# Patient Record
Sex: MALE | Race: WHITE | NOT HISPANIC OR LATINO | Employment: OTHER | URBAN - METROPOLITAN AREA
[De-identification: names, ages, dates, MRNs, and addresses within clinical notes are randomized per-mention and may not be internally consistent; named-entity substitution may affect disease eponyms.]

---

## 2021-08-24 PROCEDURE — 88305 TISSUE EXAM BY PATHOLOGIST: CPT | Performed by: UROLOGY

## 2021-08-25 ENCOUNTER — LAB REQUISITION (OUTPATIENT)
Dept: LAB | Facility: HOSPITAL | Age: 66
End: 2021-08-25

## 2021-08-25 DIAGNOSIS — R33.8 OTHER RETENTION OF URINE: ICD-10-CM

## 2021-08-25 DIAGNOSIS — N40.1 BENIGN PROSTATIC HYPERPLASIA WITH LOWER URINARY TRACT SYMPTOMS: ICD-10-CM

## 2021-08-26 LAB
LAB AP CASE REPORT: NORMAL
PATH REPORT.FINAL DX SPEC: NORMAL
PATH REPORT.GROSS SPEC: NORMAL

## 2023-12-01 ENCOUNTER — HOSPITAL ENCOUNTER (INPATIENT)
Facility: HOSPITAL | Age: 68
LOS: 3 days | Discharge: NURSING, REHAB OR LONG-TERM CARE | End: 2023-12-06
Attending: FAMILY MEDICINE | Admitting: FAMILY MEDICINE
Payer: MEDICARE

## 2023-12-01 ENCOUNTER — APPOINTMENT (OUTPATIENT)
Dept: GENERAL RADIOLOGY | Facility: HOSPITAL | Age: 68
End: 2023-12-01
Payer: MEDICARE

## 2023-12-01 ENCOUNTER — TELEPHONE (OUTPATIENT)
Dept: NEUROSURGERY | Facility: CLINIC | Age: 68
End: 2023-12-01
Payer: MEDICARE

## 2023-12-01 DIAGNOSIS — S32.020A COMPRESSION FRACTURE OF L2 VERTEBRA, INITIAL ENCOUNTER: Primary | ICD-10-CM

## 2023-12-01 PROBLEM — M54.59 INTRACTABLE LOW BACK PAIN: Status: ACTIVE | Noted: 2023-12-01

## 2023-12-01 LAB
BILIRUB UR QL STRIP: NEGATIVE
CLARITY UR: CLEAR
COLOR UR: YELLOW
GLUCOSE UR STRIP-MCNC: NEGATIVE MG/DL
HGB UR QL STRIP.AUTO: NEGATIVE
KETONES UR QL STRIP: NEGATIVE
LEUKOCYTE ESTERASE UR QL STRIP.AUTO: NEGATIVE
NITRITE UR QL STRIP: NEGATIVE
PH UR STRIP.AUTO: 6.5 [PH] (ref 5–8)
PROT UR QL STRIP: NEGATIVE
SP GR UR STRIP: 1.01 (ref 1–1.03)
UROBILINOGEN UR QL STRIP: NORMAL

## 2023-12-01 PROCEDURE — 72110 X-RAY EXAM L-2 SPINE 4/>VWS: CPT

## 2023-12-01 PROCEDURE — 71046 X-RAY EXAM CHEST 2 VIEWS: CPT

## 2023-12-01 PROCEDURE — 25010000002 ENOXAPARIN PER 10 MG: Performed by: FAMILY MEDICINE

## 2023-12-01 PROCEDURE — G0378 HOSPITAL OBSERVATION PER HR: HCPCS

## 2023-12-01 PROCEDURE — 81003 URINALYSIS AUTO W/O SCOPE: CPT | Performed by: FAMILY MEDICINE

## 2023-12-01 RX ORDER — DESMOPRESSIN ACETATE 0.2 MG/1
200 TABLET ORAL NIGHTLY
Status: DISCONTINUED | OUTPATIENT
Start: 2023-12-01 | End: 2023-12-02

## 2023-12-01 RX ORDER — SODIUM CHLORIDE 9 MG/ML
40 INJECTION, SOLUTION INTRAVENOUS AS NEEDED
Status: DISCONTINUED | OUTPATIENT
Start: 2023-12-01 | End: 2023-12-06 | Stop reason: HOSPADM

## 2023-12-01 RX ORDER — ONDANSETRON 2 MG/ML
4 INJECTION INTRAMUSCULAR; INTRAVENOUS EVERY 6 HOURS PRN
Status: DISCONTINUED | OUTPATIENT
Start: 2023-12-01 | End: 2023-12-06 | Stop reason: HOSPADM

## 2023-12-01 RX ORDER — FAMOTIDINE 20 MG/1
40 TABLET, FILM COATED ORAL DAILY
Status: DISCONTINUED | OUTPATIENT
Start: 2023-12-01 | End: 2023-12-06 | Stop reason: HOSPADM

## 2023-12-01 RX ORDER — SODIUM CHLORIDE 0.9 % (FLUSH) 0.9 %
10 SYRINGE (ML) INJECTION AS NEEDED
Status: DISCONTINUED | OUTPATIENT
Start: 2023-12-01 | End: 2023-12-06 | Stop reason: HOSPADM

## 2023-12-01 RX ORDER — SODIUM CHLORIDE 0.9 % (FLUSH) 0.9 %
10 SYRINGE (ML) INJECTION EVERY 12 HOURS SCHEDULED
Status: DISCONTINUED | OUTPATIENT
Start: 2023-12-01 | End: 2023-12-06 | Stop reason: HOSPADM

## 2023-12-01 RX ORDER — DULOXETIN HYDROCHLORIDE 30 MG/1
60 CAPSULE, DELAYED RELEASE ORAL DAILY
Status: DISCONTINUED | OUTPATIENT
Start: 2023-12-02 | End: 2023-12-06 | Stop reason: HOSPADM

## 2023-12-01 RX ORDER — BISACODYL 5 MG/1
5 TABLET, DELAYED RELEASE ORAL DAILY PRN
Status: DISCONTINUED | OUTPATIENT
Start: 2023-12-01 | End: 2023-12-06 | Stop reason: HOSPADM

## 2023-12-01 RX ORDER — ENOXAPARIN SODIUM 100 MG/ML
40 INJECTION SUBCUTANEOUS
Status: DISCONTINUED | OUTPATIENT
Start: 2023-12-01 | End: 2023-12-06 | Stop reason: HOSPADM

## 2023-12-01 RX ORDER — CARBAMAZEPINE 100 MG/1
200 TABLET, EXTENDED RELEASE ORAL EVERY 12 HOURS SCHEDULED
Status: DISCONTINUED | OUTPATIENT
Start: 2023-12-01 | End: 2023-12-02

## 2023-12-01 RX ORDER — HYDROCODONE BITARTRATE AND ACETAMINOPHEN 7.5; 325 MG/1; MG/1
1 TABLET ORAL EVERY 6 HOURS PRN
Status: DISCONTINUED | OUTPATIENT
Start: 2023-12-01 | End: 2023-12-06 | Stop reason: HOSPADM

## 2023-12-01 RX ORDER — ALUMINA, MAGNESIA, AND SIMETHICONE 2400; 2400; 240 MG/30ML; MG/30ML; MG/30ML
15 SUSPENSION ORAL EVERY 6 HOURS PRN
Status: DISCONTINUED | OUTPATIENT
Start: 2023-12-01 | End: 2023-12-06 | Stop reason: HOSPADM

## 2023-12-01 RX ORDER — AMOXICILLIN 250 MG
2 CAPSULE ORAL 2 TIMES DAILY
Status: DISCONTINUED | OUTPATIENT
Start: 2023-12-01 | End: 2023-12-06 | Stop reason: HOSPADM

## 2023-12-01 RX ORDER — POLYETHYLENE GLYCOL 3350 17 G/17G
17 POWDER, FOR SOLUTION ORAL DAILY PRN
Status: DISCONTINUED | OUTPATIENT
Start: 2023-12-01 | End: 2023-12-06 | Stop reason: HOSPADM

## 2023-12-01 RX ORDER — BISACODYL 10 MG
10 SUPPOSITORY, RECTAL RECTAL DAILY PRN
Status: DISCONTINUED | OUTPATIENT
Start: 2023-12-01 | End: 2023-12-06 | Stop reason: HOSPADM

## 2023-12-01 RX ADMIN — ENOXAPARIN SODIUM 40 MG: 100 INJECTION SUBCUTANEOUS at 20:59

## 2023-12-01 RX ADMIN — FAMOTIDINE 40 MG: 20 TABLET ORAL at 20:59

## 2023-12-01 RX ADMIN — Medication 10 ML: at 21:00

## 2023-12-01 NOTE — TELEPHONE ENCOUNTER
"Left message for patient to please call the office as we need to Reschedule his appointment due to Dr Bedoya will be in the OR.\"HUB OK TO SCHEDULE\"  "

## 2023-12-01 NOTE — TELEPHONE ENCOUNTER
Called patient and asked him to please bring the Disc from his MRI at [facility] and he did say he would bring that. He did also state that he had fallen and would be going to his PCP today at 3:15.

## 2023-12-02 ENCOUNTER — APPOINTMENT (OUTPATIENT)
Dept: MRI IMAGING | Facility: HOSPITAL | Age: 68
End: 2023-12-02
Payer: MEDICARE

## 2023-12-02 LAB
ALBUMIN SERPL-MCNC: 2.8 G/DL (ref 3.5–5.2)
ALBUMIN SERPL-MCNC: 3 G/DL (ref 3.5–5.2)
ALBUMIN SERPL-MCNC: 3.1 G/DL (ref 3.5–5.2)
ALBUMIN/GLOB SERPL: 0.9 G/DL
ALBUMIN/GLOB SERPL: 0.9 G/DL
ALBUMIN/GLOB SERPL: 1 G/DL
ALP SERPL-CCNC: 107 U/L (ref 39–117)
ALP SERPL-CCNC: 109 U/L (ref 39–117)
ALP SERPL-CCNC: 116 U/L (ref 39–117)
ALT SERPL W P-5'-P-CCNC: 12 U/L (ref 1–41)
ALT SERPL W P-5'-P-CCNC: 9 U/L (ref 1–41)
ALT SERPL W P-5'-P-CCNC: 9 U/L (ref 1–41)
AMPHET+METHAMPHET UR QL: NEGATIVE
ANION GAP SERPL CALCULATED.3IONS-SCNC: 8 MMOL/L (ref 5–15)
ANION GAP SERPL CALCULATED.3IONS-SCNC: 8 MMOL/L (ref 5–15)
ANION GAP SERPL CALCULATED.3IONS-SCNC: 9 MMOL/L (ref 5–15)
AST SERPL-CCNC: 14 U/L (ref 1–40)
AST SERPL-CCNC: 16 U/L (ref 1–40)
AST SERPL-CCNC: 18 U/L (ref 1–40)
BARBITURATES UR QL SCN: NEGATIVE
BASOPHILS # BLD AUTO: 0.1 10*3/MM3 (ref 0–0.2)
BASOPHILS # BLD AUTO: 0.1 10*3/MM3 (ref 0–0.2)
BASOPHILS NFR BLD AUTO: 1.2 % (ref 0–1.5)
BASOPHILS NFR BLD AUTO: 1.3 % (ref 0–1.5)
BENZODIAZ UR QL SCN: NEGATIVE
BILIRUB SERPL-MCNC: 0.2 MG/DL (ref 0–1.2)
BILIRUB SERPL-MCNC: 0.3 MG/DL (ref 0–1.2)
BILIRUB SERPL-MCNC: <0.2 MG/DL (ref 0–1.2)
BUN SERPL-MCNC: 10 MG/DL (ref 8–23)
BUN SERPL-MCNC: 10 MG/DL (ref 8–23)
BUN SERPL-MCNC: 9 MG/DL (ref 8–23)
BUN/CREAT SERPL: 11.4 (ref 7–25)
BUN/CREAT SERPL: 11.4 (ref 7–25)
BUN/CREAT SERPL: 11.7 (ref 7–25)
CALCIUM SPEC-SCNC: 8.5 MG/DL (ref 8.6–10.5)
CALCIUM SPEC-SCNC: 9 MG/DL (ref 8.6–10.5)
CALCIUM SPEC-SCNC: 9.1 MG/DL (ref 8.6–10.5)
CANNABINOIDS SERPL QL: NEGATIVE
CHLORIDE SERPL-SCNC: 93 MMOL/L (ref 98–107)
CHLORIDE SERPL-SCNC: 94 MMOL/L (ref 98–107)
CHLORIDE SERPL-SCNC: 96 MMOL/L (ref 98–107)
CO2 SERPL-SCNC: 28 MMOL/L (ref 22–29)
CO2 SERPL-SCNC: 28 MMOL/L (ref 22–29)
CO2 SERPL-SCNC: 30 MMOL/L (ref 22–29)
COCAINE UR QL: NEGATIVE
CREAT SERPL-MCNC: 0.77 MG/DL (ref 0.76–1.27)
CREAT SERPL-MCNC: 0.88 MG/DL (ref 0.76–1.27)
CREAT SERPL-MCNC: 0.88 MG/DL (ref 0.76–1.27)
DEPRECATED RDW RBC AUTO: 42 FL (ref 37–54)
DEPRECATED RDW RBC AUTO: 42.9 FL (ref 37–54)
EGFRCR SERPLBLD CKD-EPI 2021: 94.2 ML/MIN/1.73
EGFRCR SERPLBLD CKD-EPI 2021: 94.2 ML/MIN/1.73
EGFRCR SERPLBLD CKD-EPI 2021: 98.1 ML/MIN/1.73
EOSINOPHIL # BLD AUTO: 0.3 10*3/MM3 (ref 0–0.4)
EOSINOPHIL # BLD AUTO: 0.4 10*3/MM3 (ref 0–0.4)
EOSINOPHIL NFR BLD AUTO: 5.4 % (ref 0.3–6.2)
EOSINOPHIL NFR BLD AUTO: 6.9 % (ref 0.3–6.2)
ERYTHROCYTE [DISTWIDTH] IN BLOOD BY AUTOMATED COUNT: 15 % (ref 12.3–15.4)
ERYTHROCYTE [DISTWIDTH] IN BLOOD BY AUTOMATED COUNT: 15.3 % (ref 12.3–15.4)
ERYTHROCYTE [SEDIMENTATION RATE] IN BLOOD: 49 MM/HR (ref 0–20)
GLOBULIN UR ELPH-MCNC: 3 GM/DL
GLOBULIN UR ELPH-MCNC: 3.1 GM/DL
GLOBULIN UR ELPH-MCNC: 3.3 GM/DL
GLUCOSE SERPL-MCNC: 76 MG/DL (ref 65–99)
GLUCOSE SERPL-MCNC: 90 MG/DL (ref 65–99)
GLUCOSE SERPL-MCNC: 94 MG/DL (ref 65–99)
HCT VFR BLD AUTO: 34.2 % (ref 37.5–51)
HCT VFR BLD AUTO: 34.8 % (ref 37.5–51)
HGB BLD-MCNC: 11.4 G/DL (ref 13–17.7)
HGB BLD-MCNC: 11.7 G/DL (ref 13–17.7)
LYMPHOCYTES # BLD AUTO: 1.5 10*3/MM3 (ref 0.7–3.1)
LYMPHOCYTES # BLD AUTO: 1.6 10*3/MM3 (ref 0.7–3.1)
LYMPHOCYTES NFR BLD AUTO: 26 % (ref 19.6–45.3)
LYMPHOCYTES NFR BLD AUTO: 26.9 % (ref 19.6–45.3)
MCH RBC QN AUTO: 26.2 PG (ref 26.6–33)
MCH RBC QN AUTO: 26.3 PG (ref 26.6–33)
MCHC RBC AUTO-ENTMCNC: 33.3 G/DL (ref 31.5–35.7)
MCHC RBC AUTO-ENTMCNC: 33.7 G/DL (ref 31.5–35.7)
MCV RBC AUTO: 78 FL (ref 79–97)
MCV RBC AUTO: 78.7 FL (ref 79–97)
METHADONE UR QL SCN: NEGATIVE
MONOCYTES # BLD AUTO: 0.8 10*3/MM3 (ref 0.1–0.9)
MONOCYTES # BLD AUTO: 0.8 10*3/MM3 (ref 0.1–0.9)
MONOCYTES NFR BLD AUTO: 14.2 % (ref 5–12)
MONOCYTES NFR BLD AUTO: 14.6 % (ref 5–12)
NEUTROPHILS NFR BLD AUTO: 3 10*3/MM3 (ref 1.7–7)
NEUTROPHILS NFR BLD AUTO: 3 10*3/MM3 (ref 1.7–7)
NEUTROPHILS NFR BLD AUTO: 50.7 % (ref 42.7–76)
NEUTROPHILS NFR BLD AUTO: 52.8 % (ref 42.7–76)
NRBC BLD AUTO-RTO: 0 /100 WBC (ref 0–0.2)
NRBC BLD AUTO-RTO: 0.1 /100 WBC (ref 0–0.2)
OPIATES UR QL: NEGATIVE
OXYCODONE UR QL SCN: NEGATIVE
PLATELET # BLD AUTO: 523 10*3/MM3 (ref 140–450)
PLATELET # BLD AUTO: 555 10*3/MM3 (ref 140–450)
PMV BLD AUTO: 6.5 FL (ref 6–12)
PMV BLD AUTO: 6.6 FL (ref 6–12)
POTASSIUM SERPL-SCNC: 4 MMOL/L (ref 3.5–5.2)
POTASSIUM SERPL-SCNC: 4.1 MMOL/L (ref 3.5–5.2)
POTASSIUM SERPL-SCNC: 4.4 MMOL/L (ref 3.5–5.2)
PROT SERPL-MCNC: 5.9 G/DL (ref 6–8.5)
PROT SERPL-MCNC: 6 G/DL (ref 6–8.5)
PROT SERPL-MCNC: 6.4 G/DL (ref 6–8.5)
RBC # BLD AUTO: 4.35 10*6/MM3 (ref 4.14–5.8)
RBC # BLD AUTO: 4.46 10*6/MM3 (ref 4.14–5.8)
SODIUM SERPL-SCNC: 130 MMOL/L (ref 136–145)
SODIUM SERPL-SCNC: 132 MMOL/L (ref 136–145)
SODIUM SERPL-SCNC: 132 MMOL/L (ref 136–145)
T4 FREE SERPL-MCNC: 1.16 NG/DL (ref 0.93–1.7)
TSH SERPL DL<=0.05 MIU/L-ACNC: 7.14 UIU/ML (ref 0.27–4.2)
WBC NRBC COR # BLD AUTO: 5.6 10*3/MM3 (ref 3.4–10.8)
WBC NRBC COR # BLD AUTO: 5.9 10*3/MM3 (ref 3.4–10.8)

## 2023-12-02 PROCEDURE — 85652 RBC SED RATE AUTOMATED: CPT | Performed by: FAMILY MEDICINE

## 2023-12-02 PROCEDURE — 80307 DRUG TEST PRSMV CHEM ANLYZR: CPT | Performed by: FAMILY MEDICINE

## 2023-12-02 PROCEDURE — G0378 HOSPITAL OBSERVATION PER HR: HCPCS

## 2023-12-02 PROCEDURE — 72148 MRI LUMBAR SPINE W/O DYE: CPT

## 2023-12-02 PROCEDURE — 85025 COMPLETE CBC W/AUTO DIFF WBC: CPT | Performed by: FAMILY MEDICINE

## 2023-12-02 PROCEDURE — 84443 ASSAY THYROID STIM HORMONE: CPT | Performed by: FAMILY MEDICINE

## 2023-12-02 PROCEDURE — 25010000002 ENOXAPARIN PER 10 MG: Performed by: FAMILY MEDICINE

## 2023-12-02 PROCEDURE — 80053 COMPREHEN METABOLIC PANEL: CPT | Performed by: FAMILY MEDICINE

## 2023-12-02 PROCEDURE — 84439 ASSAY OF FREE THYROXINE: CPT | Performed by: FAMILY MEDICINE

## 2023-12-02 PROCEDURE — 99203 OFFICE O/P NEW LOW 30 MIN: CPT | Performed by: NEUROLOGICAL SURGERY

## 2023-12-02 RX ORDER — DESMOPRESSIN ACETATE 0.2 MG/1
400 TABLET ORAL NIGHTLY
Status: DISCONTINUED | OUTPATIENT
Start: 2023-12-02 | End: 2023-12-06 | Stop reason: HOSPADM

## 2023-12-02 RX ORDER — DULOXETIN HYDROCHLORIDE 60 MG/1
60 CAPSULE, DELAYED RELEASE ORAL DAILY
COMMUNITY

## 2023-12-02 RX ORDER — FEXOFENADINE HCL 180 MG/1
180 TABLET ORAL DAILY
COMMUNITY

## 2023-12-02 RX ORDER — CARBAMAZEPINE 200 MG/1
200 TABLET, EXTENDED RELEASE ORAL 2 TIMES DAILY
COMMUNITY

## 2023-12-02 RX ORDER — LEVOTHYROXINE SODIUM 0.03 MG/1
25 TABLET ORAL
COMMUNITY

## 2023-12-02 RX ORDER — LEVOTHYROXINE SODIUM 0.03 MG/1
25 TABLET ORAL
Status: DISCONTINUED | OUTPATIENT
Start: 2023-12-03 | End: 2023-12-02

## 2023-12-02 RX ORDER — GABAPENTIN 300 MG/1
300 CAPSULE ORAL DAILY
COMMUNITY

## 2023-12-02 RX ORDER — DESMOPRESSIN ACETATE 0.2 MG/1
0.4 TABLET ORAL NIGHTLY
COMMUNITY

## 2023-12-02 RX ORDER — HYDRALAZINE HYDROCHLORIDE 25 MG/1
50 TABLET, FILM COATED ORAL 3 TIMES DAILY
Status: DISCONTINUED | OUTPATIENT
Start: 2023-12-02 | End: 2023-12-06 | Stop reason: HOSPADM

## 2023-12-02 RX ORDER — LEVOTHYROXINE SODIUM 0.05 MG/1
50 TABLET ORAL
Status: DISCONTINUED | OUTPATIENT
Start: 2023-12-03 | End: 2023-12-06 | Stop reason: HOSPADM

## 2023-12-02 RX ORDER — CARBAMAZEPINE 200 MG/1
200 TABLET ORAL 2 TIMES DAILY
Status: DISCONTINUED | OUTPATIENT
Start: 2023-12-02 | End: 2023-12-06 | Stop reason: HOSPADM

## 2023-12-02 RX ORDER — HYDRALAZINE HYDROCHLORIDE 50 MG/1
50 TABLET, FILM COATED ORAL 3 TIMES DAILY
COMMUNITY

## 2023-12-02 RX ADMIN — DESMOPRESSIN ACETATE 400 MCG: 0.2 TABLET ORAL at 20:08

## 2023-12-02 RX ADMIN — Medication 10 ML: at 10:16

## 2023-12-02 RX ADMIN — DOCUSATE SODIUM 50 MG AND SENNOSIDES 8.6 MG 2 TABLET: 8.6; 5 TABLET, FILM COATED ORAL at 10:14

## 2023-12-02 RX ADMIN — HYDRALAZINE HYDROCHLORIDE 50 MG: 25 TABLET, FILM COATED ORAL at 17:44

## 2023-12-02 RX ADMIN — HYDROCODONE BITARTRATE AND ACETAMINOPHEN 1 TABLET: 7.5; 325 TABLET ORAL at 06:06

## 2023-12-02 RX ADMIN — FAMOTIDINE 40 MG: 20 TABLET ORAL at 10:14

## 2023-12-02 RX ADMIN — HYDRALAZINE HYDROCHLORIDE 50 MG: 25 TABLET, FILM COATED ORAL at 20:08

## 2023-12-02 RX ADMIN — Medication 10 ML: at 20:08

## 2023-12-02 RX ADMIN — HYDROCODONE BITARTRATE AND ACETAMINOPHEN 1 TABLET: 7.5; 325 TABLET ORAL at 20:08

## 2023-12-02 RX ADMIN — HYDROCODONE BITARTRATE AND ACETAMINOPHEN 1 TABLET: 7.5; 325 TABLET ORAL at 13:48

## 2023-12-02 RX ADMIN — CARBAMAZEPINE 200 MG: 200 TABLET ORAL at 20:08

## 2023-12-02 RX ADMIN — CARBAMAZEPINE 200 MG: 200 TABLET ORAL at 10:14

## 2023-12-02 RX ADMIN — ENOXAPARIN SODIUM 40 MG: 100 INJECTION SUBCUTANEOUS at 17:44

## 2023-12-02 RX ADMIN — DOCUSATE SODIUM 50 MG AND SENNOSIDES 8.6 MG 2 TABLET: 8.6; 5 TABLET, FILM COATED ORAL at 20:08

## 2023-12-02 RX ADMIN — HYDROCODONE BITARTRATE AND ACETAMINOPHEN 1 TABLET: 7.5; 325 TABLET ORAL at 00:48

## 2023-12-02 RX ADMIN — DULOXETINE HYDROCHLORIDE 60 MG: 30 CAPSULE, DELAYED RELEASE ORAL at 10:14

## 2023-12-02 NOTE — CONSULTS
Patient Care Team:  Jeff Jacques MD as PCP - General (Urology)    Chief complaint severe pain in the back.    Subjective .     History of present illness: This patient began having severe pain in his back a week or 2 ago.  The pain was so severe that he could barely walk.  He has fallen a couple of times at home as well.  There is no radiation into his legs.  He has no difficulty with bowel bladder control.  He has had no specific treatment so far.    Review of Systems  Pertinent items are noted in HPI.  History    History reviewed. No pertinent past medical history., History reviewed. No pertinent surgical history., History reviewed. No pertinent family history.,   Social History     Socioeconomic History    Marital status: Single   Tobacco Use    Smoking status: Former     Types: Cigarettes    Smokeless tobacco: Never   Vaping Use    Vaping Use: Never used   Substance and Sexual Activity    Alcohol use: Not Currently    Drug use: Not Currently    Sexual activity: Defer     E-cigarette/Vaping    E-cigarette/Vaping Use Never User      E-cigarette/Vaping Substances     E-cigarette/Vaping Devices         ,   No medications prior to admission.   , and Allergies:  Patient has no known allergies.    Objective     Vital Signs   Temp:  [97.3 °F (36.3 °C)-98.6 °F (37 °C)] 97.7 °F (36.5 °C)  Heart Rate:  [71-75] 72  Resp:  [18] 18  BP: (133-140)/(67-81) 137/75    Physical Exam:   Physical Exam  Eyes:      Extraocular Movements: EOM normal.      Pupils: Pupils are equal, round, and reactive to light.   Neurological:      Mental Status: He is alert and oriented to person, place, and time.      Coordination: Finger-Nose-Finger Test and Heel to Shin Test normal.      Deep Tendon Reflexes:      Reflex Scores:       Tricep reflexes are 2+ on the right side and 2+ on the left side.       Bicep reflexes are 2+ on the right side and 2+ on the left side.       Brachioradialis reflexes are 2+ on the right side and 2+ on the left  side.       Patellar reflexes are 2+ on the right side and 2+ on the left side.       Achilles reflexes are 2+ on the right side and 2+ on the left side.  Psychiatric:         Speech: Speech normal.          Neurologic Exam     Mental Status   Oriented to person, place, and time.   Registration of memory: Good recent and remote memory.   Attention: normal. Concentration: normal.   Speech: speech is normal   Level of consciousness: alert  Knowledge: consistent with education.     Cranial Nerves     CN II   Visual fields full to confrontation.   Visual acuity: normal    CN III, IV, VI   Pupils are equal, round, and reactive to light.  Extraocular motions are normal.     CN V   Facial sensation intact.   Right corneal reflex: normal  Left corneal reflex: normal    CN VII   Facial expression full, symmetric.   Right facial weakness: none  Left facial weakness: none    CN VIII   Hearing: intact    CN IX, X   Palate: symmetric    CN XI   Right sternocleidomastoid strength: normal  Left sternocleidomastoid strength: normal    CN XII   Tongue: not atrophic  Tongue deviation: none    Motor Exam   Muscle bulk: normal  Right arm tone: normal  Left arm tone: normal  Right leg tone: normal  Left leg tone: normal    Strength   Strength 5/5 except as noted.     Sensory Exam   Light touch normal.     Gait, Coordination, and Reflexes     Coordination   Finger to nose coordination: normal  Heel to shin coordination: normal    Reflexes   Right brachioradialis: 2+  Left brachioradialis: 2+  Right biceps: 2+  Left biceps: 2+  Right triceps: 2+  Left triceps: 2+  Right patellar: 2+  Left patellar: 2+  Right achilles: 2+  Left achilles: 2+  Right : 2+  Left : 2+      Results Review:   I reviewed the patient's new clinical results.  I reviewed his plain films which show an L2 compression fracture.  I cannot tell for sure about retropulsion.      Assessment & Plan       Intractable low back pain      I told the patient I thought  most of his pain was coming from the L2 compression fracture.  He may be a candidate for some epidural blocks or some other medications to help with the pain until that heals.  He may also be a candidate for a brace but before we do anything we need to get an MRI to be sure there is no significant neural compression.  I think this is unlikely in the absence of leg pain but I do think we need to check.    I discussed the patients findings and my recommendations with patient    Hussain Maloney MD  12/02/23  10:51 EST

## 2023-12-02 NOTE — H&P
Patient Care Team:  Jeff Jacques MD as PCP - General (Urology)    Chief complaint  Intractable LBP, BLE weakness and multiple falls    Subjective     Patient is a 68 y.o. male presents with Intractable LBP for the past few weeks, had Physical therapy, MRI done, according to him , his pain is getting worse, but in the past couple of days, he had 2 falls, because his BLE is weak and numb on and off, and not able to take care of him self, has appt with spine surgeon on 12/4.    Review of Systems   Constitutional:  Positive for activity change and fatigue.   Eyes: Negative.    Respiratory: Negative.     Cardiovascular: Negative.    Gastrointestinal:  Positive for nausea.   Endocrine: Negative.    Genitourinary:  Positive for frequency.   Musculoskeletal:  Positive for back pain and gait problem.   Neurological:  Positive for weakness and headaches.   Psychiatric/Behavioral:  Positive for behavioral problems and sleep disturbance.            History  History reviewed. No pertinent past medical history.  History reviewed. No pertinent surgical history.  History reviewed. No pertinent family history.  Social History     Tobacco Use    Smoking status: Former     Types: Cigarettes    Smokeless tobacco: Never   Vaping Use    Vaping Use: Never used   Substance Use Topics    Alcohol use: Not Currently    Drug use: Not Currently     No medications prior to admission.     Allergies:  Patient has no known allergies.    Objective     Vital Signs  Temp:  [98.6 °F (37 °C)] 98.6 °F (37 °C)  Heart Rate:  [71] 71  Resp:  [18] 18  BP: (140)/(81) 140/81    Physical Exam:       General Appearance:    Alert, cooperative, in no acute distress   Eyes:            Lids and lashes normal, conjunctivae and sclerae normal, no   icterus, no pallor, corneas clear, PERRLA   Ears:    Ears appear intact with no abnormalities noted   Throat:   No oral lesions, no thrush, oral mucosa moist   Neck:   No adenopathy, supple, trachea midline, no  thyromegaly, no   carotid bruit, no JVD   Lungs:     Clear to auscultation,respirations regular, even and                Unlabored     Heart:    Regular rhythm and normal rate, normal S1 and S2, no          murmur, no gallop, no rub, no click   Abdomen:     Normal bowel sounds, no masses, no organomegaly, soft      non-tender, non-distended, no guarding, no rebound                tenderness   Extremities:   Moves all extremities well, no edema, no cyanosis, no           redness   Pulses:   Pulses palpable and equal bilaterally   Skin:   No bleeding, bruising or rash   Neurologic:   Cranial nerves 2 - 12 grossly intact, sensation intact, DTR       present and equal bilaterally       Results Review:  Lab Results (last 48 hours)       Procedure Component Value Units Date/Time    Urinalysis With Culture If Indicated - Urine, Clean Catch [[id number]]  (Normal) Collected: 12/01/23 2112    Specimen: Urine, Clean Catch Updated: 12/01/23 2153     Color, UA Yellow     Appearance, UA Clear     pH, UA 6.5     Specific Gravity, UA 1.006     Glucose, UA Negative     Ketones, UA Negative     Bilirubin, UA Negative     Blood, UA Negative     Protein, UA Negative     Leuk Esterase, UA Negative     Nitrite, UA Negative     Urobilinogen, UA 1.0 E.U./dL    Narrative:      In absence of clinical symptoms, the presence of pyuria, bacteria, and/or nitrites on the urinalysis result does not correlate with infection.  Urine microscopic not indicated.            Imaging Results (Last 24 Hours)       Procedure Component Value Units Date/Time    XR Spine Lumbar Complete 4+VW [[id number]] Resulted: 12/01/23 2222     Updated: 12/01/23 2230    XR Chest PA & Lateral [[id number]] Resulted: 12/01/23 2222     Updated: 12/01/23 2229                 Assessment & Plan     Principal Problem:    Intractable low back pain  - Pain control    BLE weakness - spine surgery consult    Abnormal MRI of L-S Spine - spine surgery consult    Hypothyroid = check TSH and  T4    COPD  - Stable    Major recurrent depression with psychotic behavioral - continue home medicine    Unsteady Gait with multiple Falls - PT consult and may need short term rehab     Stress ulcer/DVT prophylaxis           Plan for disposition: May need rehab at discharge     Danuta Dillon MD  12/01/23  22:36 EST

## 2023-12-02 NOTE — PLAN OF CARE
Goal Outcome Evaluation:  Plan of Care Reviewed With: patient        Progress: no change  Outcome Evaluation: Consult for spine specialist in am.

## 2023-12-02 NOTE — NURSING NOTE
Patient does not know his medications.  [facility] in [place] will need to be contacted when they open later this am.

## 2023-12-02 NOTE — PROGRESS NOTES
LOS: 0 days   Patient Care Team:  Jeff Jacques MD as PCP - General (Urology)        Subjective  - Lying on bed    Interval History:  None    Patient Complaints: c/o pain with activity    Review of Systems   Constitutional:  Positive for activity change and fatigue.   Eyes: Negative.    Respiratory: Negative.     Cardiovascular: Negative.    Gastrointestinal: Negative.    Endocrine: Negative.    Genitourinary:  Positive for frequency.   Musculoskeletal:  Positive for back pain and gait problem.   Neurological:  Positive for weakness.   Psychiatric/Behavioral:  Positive for sleep disturbance. The patient is nervous/anxious.         Objective     Vital Signs  Temp:  [97.3 °F (36.3 °C)-98.6 °F (37 °C)] 97.6 °F (36.4 °C)  Heart Rate:  [71-75] 72  Resp:  [18] 18  BP: (133-141)/(67-81) 141/78    Physical Exam:     General Appearance:    Alert, cooperative, in no acute distress   Ears:    Ears appear intact with no abnormalities noted   Throat:   No oral lesions, no thrush, oral mucosa moist   Neck:   No adenopathy, supple, trachea midline, no thyromegaly, no   carotid bruit, no JVD   Lungs:     Clear to auscultation, respirations regular, even and               Unlabored     Heart:    Regular rhythm and normal rate, normal S1 and S2, no          murmur, no gallop, no rub, no click   Abdomen:     Normal bowel sounds, no masses, no organomegaly, soft      nontender, nondistended, no guarding, no rebound                tenderness   Extremities:   Moves all extremities well, no edema, no cyanosis, no           redness   Skin:   No bleeding, bruising or rash   Neurologic:   Cranial nerves 2 - 12 grossly intact, sensation intact, DTR       present and equal bilaterally        Results Review:    Lab Results (last 24 hours)       Procedure Component Value Units Date/Time    Comprehensive Metabolic Panel [[id number]]  (Abnormal) Collected: 12/02/23 1010    Specimen: Blood Updated: 12/02/23 1057     Glucose 76 mg/dL       BUN 9 mg/dL      Creatinine 0.77 mg/dL      Sodium 132 mmol/L      Potassium 4.1 mmol/L      Chloride 94 mmol/L      CO2 30.0 mmol/L      Calcium 9.0 mg/dL      Total Protein 6.4 g/dL      Albumin 3.1 g/dL      ALT (SGPT) 9 U/L      AST (SGOT) 16 U/L      Alkaline Phosphatase 116 U/L      Total Bilirubin 0.3 mg/dL      Globulin 3.3 gm/dL      A/G Ratio 0.9 g/dL      BUN/Creatinine Ratio 11.7     Anion Gap 8.0 mmol/L      eGFR 98.1 mL/min/1.73     Narrative:      GFR Normal >60  Chronic Kidney Disease <60  Kidney Failure <15      T4, Free [[id number]]  (Normal) Collected: 12/02/23 0447    Specimen: Blood Updated: 12/02/23 0745     Free T4 1.16 ng/dL     Narrative:      Results may be falsely increased if patient taking Biotin.      Comprehensive Metabolic Panel [[id number]]  (Abnormal) Collected: 12/02/23 0450    Specimen: Blood Updated: 12/02/23 0601     Glucose 94 mg/dL      BUN 10 mg/dL      Creatinine 0.88 mg/dL      Sodium 130 mmol/L      Potassium 4.0 mmol/L      Chloride 93 mmol/L      CO2 28.0 mmol/L      Calcium 9.1 mg/dL      Total Protein 6.0 g/dL      Albumin 3.0 g/dL      ALT (SGPT) 9 U/L      AST (SGOT) 14 U/L      Alkaline Phosphatase 107 U/L      Total Bilirubin 0.2 mg/dL      Globulin 3.0 gm/dL      A/G Ratio 1.0 g/dL      BUN/Creatinine Ratio 11.4     Anion Gap 9.0 mmol/L      eGFR 94.2 mL/min/1.73     Narrative:      GFR Normal >60  Chronic Kidney Disease <60  Kidney Failure <15      TSH [[id number]]  (Abnormal) Collected: 12/02/23 0450    Specimen: Blood Updated: 12/02/23 0601     TSH 7.140 uIU/mL     Sedimentation Rate [[id number]]  (Abnormal) Collected: 12/02/23 0450    Specimen: Blood Updated: 12/02/23 0539     Sed Rate 49 mm/hr     CBC & Differential [[id number]]  (Abnormal) Collected: 12/02/23 0450    Specimen: Blood Updated: 12/02/23 0532    Narrative:      The following orders were created for panel order CBC & Differential.  Procedure                               Abnormality         Status                      ---------                               -----------         ------                     CBC Auto Differential[[id number]]        Abnormal            Final result                 Please view results for these tests on the individual orders.    CBC Auto Differential [[id number]]  (Abnormal) Collected: 12/02/23 0450    Specimen: Blood Updated: 12/02/23 0532     WBC 5.60 10*3/mm3      RBC 4.46 10*6/mm3      Hemoglobin 11.7 g/dL      Hematocrit 34.8 %      MCV 78.0 fL      MCH 26.3 pg      MCHC 33.7 g/dL      RDW 15.0 %      RDW-SD 42.9 fl      MPV 6.6 fL      Platelets 523 10*3/mm3      Neutrophil % 52.8 %      Lymphocyte % 26.0 %      Monocyte % 14.6 %      Eosinophil % 5.4 %      Basophil % 1.2 %      Neutrophils, Absolute 3.00 10*3/mm3      Lymphocytes, Absolute 1.50 10*3/mm3      Monocytes, Absolute 0.80 10*3/mm3      Eosinophils, Absolute 0.30 10*3/mm3      Basophils, Absolute 0.10 10*3/mm3      nRBC 0.1 /100 WBC     Urinalysis With Culture If Indicated - Urine, Clean Catch [[id number]]  (Normal) Collected: 12/01/23 2112    Specimen: Urine, Clean Catch Updated: 12/01/23 2153     Color, UA Yellow     Appearance, UA Clear     pH, UA 6.5     Specific Gravity, UA 1.006     Glucose, UA Negative     Ketones, UA Negative     Bilirubin, UA Negative     Blood, UA Negative     Protein, UA Negative     Leuk Esterase, UA Negative     Nitrite, UA Negative     Urobilinogen, UA 1.0 E.U./dL    Narrative:      In absence of clinical symptoms, the presence of pyuria, bacteria, and/or nitrites on the urinalysis result does not correlate with infection.  Urine microscopic not indicated.           Imaging Results (Last 24 Hours)       Procedure Component Value Units Date/Time    MRI Lumbar Spine Without Contrast [[id number]] Resulted: 12/02/23 1256     Updated: 12/02/23 1256    XR Spine Lumbar Complete 4+VW [[id number]] Collected: 12/01/23 2259     Updated: 12/01/23 2305    Narrative:      XR SPINE LUMBAR COMPLETE  4+VW    Date of Exam: 12/1/2023 9:53 PM EST    Indication: Intractable low back pain, fall yesterday. Patient unable to stand.    Comparison: None available    Findings:  There are 5 nonrib-bearing lumbar-type vertebral bodies. There is no evidence of spondylolisthesis. There is a compression type fracture involving the L2 vertebral body with very subtle buckling of the posterior superior vertebral body wall. There is 45%   anterior height loss and 20% posterior height loss. There is associated superior endplate sclerosis of L2. There is vacuum disc phenomenon. There is no retropulsion. There is severe disc height loss at L3-L4 and L4-L5. There is degenerative facet   arthropathy at L4-5 and L5-S1. The SI joints are normal alignment.      Impression:      Impression:  1. L2 superior endplate compression fracture with approximately 45% anterior height loss and 20% posterior height loss. There is subtle buckling of the posterior aspect of the vertebral body wall without evidence of retropulsion.  2. Degenerative disc height loss at L3-L4 and L4-L5 with degenerative facet arthropathy at L4-5 and L5-S1.      Electronically Signed: Nick Schmidt    12/1/2023 11:03 PM EST    Workstation ID: [id number]    XR Chest PA & Lateral [[id number]] Collected: 12/01/23 2257     Updated: 12/01/23 2301    Narrative:      XR CHEST PA AND LATERAL    Date of Exam: 12/1/2023 9:53 PM EST    Indication: Cough    Comparison: Chest radiograph dated 12/5/2010    Findings:  The cardiomediastinal silhouette is within normal limits. Pulmonary vascularity appears normal. There is no acute airspace consolidation, pleural effusion, or pneumothorax. There is elevation of the right hemidiaphragm. There is multilevel degenerative   disc disease of the thoracic spine. No evidence of thoracic compression fracture. There is a left humeral head prosthesis with superior subluxation likely related to underlying rotator cuff pathology.      Impression:       Impression:  1. No acute cardiopulmonary abnormality.  2. Multilevel degenerative disc disease of the thoracic spine without evidence of compression fracture.  3. Elevation of the right hemidiaphragm.      Electronically Signed: Nick Schmidt    12/1/2023 10:59 PM EST    Workstation ID: [id number]             I reviewed the patient's other test results and agree with the interpretation    Medication Review:     Current Facility-Administered Medications:     aluminum-magnesium hydroxide-simethicone (MAALOX MAX) 400-400-40 MG/5ML suspension 15 mL, 15 mL, Oral, Q6H PRN, Danuta Dillon MD    sennosides-docusate (PERICOLACE) 8.6-50 MG per tablet 2 tablet, 2 tablet, Oral, BID, 2 tablet at 12/02/23 1014 **AND** polyethylene glycol (MIRALAX) packet 17 g, 17 g, Oral, Daily PRN **AND** bisacodyl (DULCOLAX) EC tablet 5 mg, 5 mg, Oral, Daily PRN **AND** bisacodyl (DULCOLAX) suppository 10 mg, 10 mg, Rectal, Daily PRN, Danuta Dillon MD    Calcium Replacement - Follow Nurse / BPA Driven Protocol, , Does not apply, PRN, Danuta Dillon MD    carBAMazepine (TEGretol) tablet 200 mg, 200 mg, Oral, BID, Danuta Dillon MD, 200 mg at 12/02/23 1014    desmopressin (DDAVP) tablet 400 mcg, 400 mcg, Oral, Nightly, Danuta Dillon MD    DULoxetine (CYMBALTA) DR capsule 60 mg, 60 mg, Oral, Daily, Danuta Dillon MD, 60 mg at 12/02/23 1014    Enoxaparin Sodium (LOVENOX) syringe 40 mg, 40 mg, Subcutaneous, Q24H, Danuta Dillon MD, 40 mg at 12/01/23 2059    famotidine (PEPCID) tablet 40 mg, 40 mg, Oral, Daily, Danuta Dillon MD, 40 mg at 12/02/23 1014    HYDROcodone-acetaminophen (NORCO) 7.5-325 MG per tablet 1 tablet, 1 tablet, Oral, Q6H PRN, Danuta Dillon MD, 1 tablet at 12/02/23 0606    Influenza Vac High-Dose Quad (FLUZONE HIGH DOSE) injection 0.7 mL, 0.7 mL, Intramuscular, During Hospitalization, Danuta Dillon MD    Magnesium Standard Dose Replacement  - Follow Nurse / BPA Driven Protocol, , Does not apply, Wilma VELEZ Dhamayanthi, MD    ondansetron (ZOFRAN) injection 4 mg, 4 mg, Intravenous, Q6H PRN, Danuta Dillon MD    Phosphorus Replacement - Follow Nurse / BPA Driven Protocol, , Does not apply, Wilma VELEZ Dhamayanthi, MD    Potassium Replacement - Follow Nurse / BPA Driven Protocol, , Does not apply, Wilma VELEZ Dhamayanthi, MD    sodium chloride 0.9 % flush 10 mL, 10 mL, Intravenous, Q12H, Danuta Dillon MD, 10 mL at 12/02/23 1016    sodium chloride 0.9 % flush 10 mL, 10 mL, Intravenous, PRN, Danuta Dillon MD    sodium chloride 0.9 % infusion 40 mL, 40 mL, Intravenous, PRN, Danuta Dillon MD    I have reviewed medication list.    Assessment & Plan     Principal Problem:   Intractable low back pain  - Pain control    BLE weakness - spine surgery consult    Abnormal MRI of L-S Spine - spine surgery consult    L2 compression Fx -  Spine surgery consulted    Hypothyroid  - Elevated TSH, start pt on synthroid    COPD  - Stable    Major recurrent depression with psychotic behavioral - continue home medicine    Unsteady Gait with multiple Falls - PT consult and may need short term rehab     Stress ulcer/DVT prophylaxis            Plan for disposition: may need rehab at discharge     Danuta Dillon MD  12/02/23  13:22 EST

## 2023-12-02 NOTE — PLAN OF CARE
Problem: Skin Injury Risk Increased  Goal: Skin Health and Integrity  Outcome: Ongoing, Not Progressing  Intervention: Optimize Skin Protection  Recent Flowsheet Documentation  Taken 12/2/2023 0858 by Margie Gaona RN  Pressure Reduction Techniques: frequent weight shift encouraged  Head of Bed (HOB) Positioning: HOB at 30-45 degrees  Pressure Reduction Devices: specialty bed utilized     Problem: Adult Inpatient Plan of Care  Goal: Plan of Care Review  Outcome: Ongoing, Not Progressing  Flowsheets (Taken 12/2/2023 1420)  Progress: no change  Plan of Care Reviewed With: patient  Goal: Patient-Specific Goal (Individualized)  Outcome: Ongoing, Not Progressing  Goal: Absence of Hospital-Acquired Illness or Injury  Outcome: Ongoing, Not Progressing  Intervention: Identify and Manage Fall Risk  Recent Flowsheet Documentation  Taken 12/2/2023 1210 by Margie Gaona RN  Safety Promotion/Fall Prevention:   assistive device/personal items within reach   clutter free environment maintained   fall prevention program maintained   nonskid shoes/slippers when out of bed   safety round/check completed   room organization consistent  Taken 12/2/2023 1016 by Margie Gaona RN  Safety Promotion/Fall Prevention:   assistive device/personal items within reach   clutter free environment maintained   nonskid shoes/slippers when out of bed   room organization consistent   safety round/check completed   fall prevention program maintained  Taken 12/2/2023 0858 by Margie Gaona RN  Safety Promotion/Fall Prevention:   assistive device/personal items within reach   clutter free environment maintained   fall prevention program maintained   nonskid shoes/slippers when out of bed   room organization consistent   safety round/check completed  Intervention: Prevent Skin Injury  Recent Flowsheet Documentation  Taken 12/2/2023 0858 by Margie Gaona RN  Body Position: supine  Intervention: Prevent and Manage VTE (Venous Thromboembolism)  Risk  Recent Flowsheet Documentation  Taken 12/2/2023 0858 by Margie Gaona RN  Activity Management: activity encouraged  VTE Prevention/Management: sequential compression devices off  Range of Motion: active ROM (range of motion) encouraged  Intervention: Prevent Infection  Recent Flowsheet Documentation  Taken 12/2/2023 1210 by Margie Gaona RN  Infection Prevention:   environmental surveillance performed   hand hygiene promoted   cohorting utilized  Taken 12/2/2023 1016 by Margie Gaona RN  Infection Prevention:   environmental surveillance performed   hand hygiene promoted   cohorting utilized  Taken 12/2/2023 0858 by Margie Gaona RN  Infection Prevention:   environmental surveillance performed   hand hygiene promoted   cohorting utilized   rest/sleep promoted  Goal: Optimal Comfort and Wellbeing  Outcome: Ongoing, Not Progressing  Intervention: Monitor Pain and Promote Comfort  Recent Flowsheet Documentation  Taken 12/2/2023 0858 by Margie Gaona RN  Pain Management Interventions: diversional activity provided  Intervention: Provide Person-Centered Care  Recent Flowsheet Documentation  Taken 12/2/2023 0858 by Margie Gaona RN  Trust Relationship/Rapport:   care explained   choices provided  Goal: Readiness for Transition of Care  Outcome: Ongoing, Not Progressing     Problem: Mobility Impairment  Goal: Optimal Mobility  Outcome: Ongoing, Not Progressing  Intervention: Optimize Mobility  Recent Flowsheet Documentation  Taken 12/2/2023 0858 by Margie Gaona RN  Activity Management: activity encouraged  Assistive Device Utilized: gait belt  Positioning/Transfer Devices:   pillows   in use     Problem: Muscle Strength Impairment  Goal: Improved Muscle Strength  Outcome: Ongoing, Not Progressing  Intervention: Optimize Muscle Strength  Recent Flowsheet Documentation  Taken 12/2/2023 0858 by Margie Gaona RN  Self-Care Promotion:   independence encouraged   BADL personal objects within reach   Goal  Outcome Evaluation:  Plan of Care Reviewed With: patient        Progress: no change     Alert and oriented x 4. Able to verbalize needs and wants. Takes medication whole and tolerates well. Incontinent of bladder. Total for transfers/ambulation. Does not require O2 therapy at this time. C/O lower back pain, PRN Norco administered with positive effect noted. Urine obtained for UTOX, results pending. Fluid restriction in place. Continues to be followed by spinal surgery. MRI results pending. Currently in bed, awake. Call bell in reach. Discharge plan: none at this time.

## 2023-12-03 PROCEDURE — 97162 PT EVAL MOD COMPLEX 30 MIN: CPT

## 2023-12-03 PROCEDURE — 25010000002 ENOXAPARIN PER 10 MG: Performed by: FAMILY MEDICINE

## 2023-12-03 PROCEDURE — 97166 OT EVAL MOD COMPLEX 45 MIN: CPT

## 2023-12-03 PROCEDURE — 25010000002 HYDROCORTISONE SOD SUC (PF) 250 MG RECONSTITUTED SOLUTION: Performed by: NEUROLOGICAL SURGERY

## 2023-12-03 PROCEDURE — 99213 OFFICE O/P EST LOW 20 MIN: CPT | Performed by: NEUROLOGICAL SURGERY

## 2023-12-03 RX ADMIN — HYDROCORTISONE SODIUM SUCCINATE 250 MG: 250 INJECTION, POWDER, FOR SOLUTION INTRAMUSCULAR; INTRAVENOUS at 16:40

## 2023-12-03 RX ADMIN — HYDROCODONE BITARTRATE AND ACETAMINOPHEN 1 TABLET: 7.5; 325 TABLET ORAL at 16:41

## 2023-12-03 RX ADMIN — Medication 10 ML: at 20:53

## 2023-12-03 RX ADMIN — HYDRALAZINE HYDROCHLORIDE 50 MG: 25 TABLET, FILM COATED ORAL at 20:53

## 2023-12-03 RX ADMIN — HYDROCODONE BITARTRATE AND ACETAMINOPHEN 1 TABLET: 7.5; 325 TABLET ORAL at 10:03

## 2023-12-03 RX ADMIN — FAMOTIDINE 40 MG: 20 TABLET ORAL at 10:03

## 2023-12-03 RX ADMIN — DOCUSATE SODIUM 50 MG AND SENNOSIDES 8.6 MG 2 TABLET: 8.6; 5 TABLET, FILM COATED ORAL at 10:03

## 2023-12-03 RX ADMIN — Medication 10 ML: at 10:05

## 2023-12-03 RX ADMIN — CARBAMAZEPINE 200 MG: 200 TABLET ORAL at 20:53

## 2023-12-03 RX ADMIN — HYDROCORTISONE SODIUM SUCCINATE 250 MG: 250 INJECTION, POWDER, FOR SOLUTION INTRAMUSCULAR; INTRAVENOUS at 22:50

## 2023-12-03 RX ADMIN — DULOXETINE HYDROCHLORIDE 60 MG: 30 CAPSULE, DELAYED RELEASE ORAL at 10:03

## 2023-12-03 RX ADMIN — HYDROCORTISONE SODIUM SUCCINATE 250 MG: 250 INJECTION, POWDER, FOR SOLUTION INTRAMUSCULAR; INTRAVENOUS at 12:41

## 2023-12-03 RX ADMIN — LEVOTHYROXINE SODIUM 50 MCG: 50 TABLET ORAL at 05:07

## 2023-12-03 RX ADMIN — HYDRALAZINE HYDROCHLORIDE 50 MG: 25 TABLET, FILM COATED ORAL at 10:03

## 2023-12-03 RX ADMIN — CARBAMAZEPINE 200 MG: 200 TABLET ORAL at 10:03

## 2023-12-03 RX ADMIN — HYDRALAZINE HYDROCHLORIDE 50 MG: 25 TABLET, FILM COATED ORAL at 16:41

## 2023-12-03 RX ADMIN — DESMOPRESSIN ACETATE 400 MCG: 0.2 TABLET ORAL at 20:53

## 2023-12-03 RX ADMIN — HYDROCODONE BITARTRATE AND ACETAMINOPHEN 1 TABLET: 7.5; 325 TABLET ORAL at 03:06

## 2023-12-03 RX ADMIN — ENOXAPARIN SODIUM 40 MG: 100 INJECTION SUBCUTANEOUS at 16:41

## 2023-12-03 RX ADMIN — DOCUSATE SODIUM 50 MG AND SENNOSIDES 8.6 MG 2 TABLET: 8.6; 5 TABLET, FILM COATED ORAL at 20:53

## 2023-12-03 NOTE — PLAN OF CARE
Goal Outcome Evaluation:  Plan of Care Reviewed With: patient        Progress: no change  Outcome Evaluation: Pt is a 67 y/o M admitted to [facility] 12/1/23 with c/o intractable LBP and c/o falls BLE numbness. CXR (-) acute. XR Lumbar spine (+) L2 superior endplate compression fx with 45% anterior height loss and 20% posterior height loss, degenerative disc height loss at L3-L4, L4-L5 with degenerative facet arthropathy at L4-L5 and L5-S1. MRI lumbar spine (+) moderate L2 compression dx. PMHx significant for hx ETOH abuse, hx SI, COPD and depression. At baseline pt resides with sister and brother in law in multi level home with 3 MILI. Pt typically does not use AD for mobility and independent with ADLs. Pt has reports of numerous falls recently, requiring him to utilize standard walker at home. This date, pt A&O x4, on RA and in supine upon arrival. Pt c/o LBP however able to participate in functional mobility. Pt requires min A for supine to sit, min A to come to standing and CGA with RW for functional mobility. Pt demo decreased balance with dynamic standing, requiring min A at times, increasing risk for falls. Pt would benefit from short stay rehab prior to dc home. OT recommend SNF when medically appropriate for d/c. OT will follow.      Anticipated Discharge Disposition (OT): skilled nursing facility

## 2023-12-03 NOTE — PLAN OF CARE
Goal Outcome Evaluation:  Plan of Care Reviewed With: patient            69 y/o M who presented with c/o intractable LBP and c/o falls BLE numbness. CXR (-) acute. XR Lumbar spine (+) L2 superior endplate compression fx with 45% anterior height loss and 20% posterior height loss, degenerative disc height loss at L3-L4, L4-L5 with degenerative facet arthropathy at L4-L5 and L5-S1. MRI lumbar spine (+) moderate L2 compression dx. PMHx significant for hx ETOH abuse, hx SI, COPD and depression. At baseline pt resides with sister and brother in law in multi level home with 3 MILI. Pt typically does not use AD for mobility and independent with ADLs. Pt has reports of numerous falls recently, requiring him to utilize standard walker at home. This date, pt A&O x4, on RA and in supine upon arrival. Pt c/o LBP however able to participate in functional mobility. Pt requires min A for supine to sit, min A to come to standing and CGA with RW for functional mobility. Pt demo decreased balance with dynamic standing, requiring min A at times, increasing risk for falls. Significant decreased gait speed, cues for sequencing, fear of falling. Recommending SNF at d/c as pt is not safe to return home at this time.       Anticipated Discharge Disposition (PT): skilled nursing facility

## 2023-12-03 NOTE — THERAPY EVALUATION
Patient Name: Marek Wu  : 1955    MRN: [id number]                              Today's Date: 12/3/2023       Admit Date: 2023    Visit Dx: No diagnosis found.  Patient Active Problem List   Diagnosis    Intractable low back pain     History reviewed. No pertinent past medical history.  History reviewed. No pertinent surgical history.   General Information       Row Name 23 1419          OT Time and Intention    Document Type evaluation  -MS     Mode of Treatment occupational therapy  -MS       Row Name 23 1419          General Information    Patient Profile Reviewed yes  -MS     Prior Level of Function independent:;ADL's;all household mobility;community mobility  -MS     Existing Precautions/Restrictions fall  -MS     Barriers to Rehab medically complex  -MS       Row Name 23 1419          Occupational Profile    Reason for Services/Referral (Occupational Profile) Pt is a 67 y/o M admitted to [facility] 23 with c/o intractable LBP and c/o falls BLE numbness. CXR (-) acute. XR Lumbar spine (+) L2 superior endplate compression fx with 45% anterior height loss and 20% posterior height loss, degenerative disc height loss at L3-L4, L4-L5 with degenerative facet arthropathy at L4-L5 and L5-S1. MRI lumbar spine (+) moderate L2 compression dx. PMHx significant for hx ETOH abuse, hx SI, COPD and depression. At baseline pt resides with sister and brother in law in multi level home with 3 MILI. Pt typically does not use AD for mobility and independent with ADLs. Pt has reports of numerous falls recently, requiring him to utilize standard walker at home.  -MS     Environmental Supports and Barriers (Occupational Profile) supportive family, living with sister currently  -MS       Row Name 23 1419          Living Environment    People in Home sibling(s);other (see comments)  brother in law  -MS       Row Name 23 1419          Home Main Entrance    Number of Stairs, Main Entrance  three  -MS       Row Name 12/03/23 1419          Stairs Within Home, Primary    Number of Stairs, Within Home, Primary other (see comments)  resides on main level  -MS       Row Name 12/03/23 1419          Cognition    Orientation Status (Cognition) oriented x 4  -MS       Row Name 12/03/23 1419          Safety Issues, Functional Mobility    Safety Issues Affecting Function (Mobility) insight into deficits/self-awareness;positioning of assistive device;problem-solving  -MS     Impairments Affecting Function (Mobility) balance;endurance/activity tolerance;pain;range of motion (ROM);strength  -MS               User Key  (r) = Recorded By, (t) = Taken By, (c) = Cosigned By      Initials Name Provider Type    Corie Cavanaugh OT Occupational Therapist                     Mobility/ADL's       Row Name 12/03/23 1420          Bed Mobility    Bed Mobility supine-sit;sit-supine  -MS     Supine-Sit Walker (Bed Mobility) minimum assist (75% patient effort)  -MS     Sit-Supine Walker (Bed Mobility) standby assist  -MS     Assistive Device (Bed Mobility) bed rails;head of bed elevated  -MS       Row Name 12/03/23 1420          Transfers    Transfers sit-stand transfer  -MS       Row Name 12/03/23 1420          Sit-Stand Transfer    Sit-Stand Walker (Transfers) minimum assist (75% patient effort)  -MS     Assistive Device (Sit-Stand Transfers) walker, front-wheeled  -MS       Row Name 12/03/23 1420          Activities of Daily Living    BADL Assessment/Intervention toileting  -MS       Row Name 12/03/23 1420          Toileting Assessment/Training    Walker Level (Toileting) supervision  -MS     Assistive Devices (Toileting) urinal  -MS     Position (Toileting) supine  -MS               User Key  (r) = Recorded By, (t) = Taken By, (c) = Cosigned By      Initials Name Provider Type    Corie Cavanaugh OT Occupational Therapist                   Obj/Interventions       Row Name 12/03/23 1428           Sensory Assessment (Somatosensory)    Sensory Assessment (Somatosensory) UE sensation intact  -MS     Sensory Assessment neuropathy in venessa feet  -MS       Row Name 12/03/23 1421          Vision Assessment/Intervention    Visual Impairment/Limitations WFL  -MS       Row Name 12/03/23 1421          Range of Motion Comprehensive    Comment, General Range of Motion RUE shoulder ABD <45, flex <90, elbow, wrist, digits WNL, LUE WNL  -MS       Row Name 12/03/23 1421          Strength Comprehensive (MMT)    Comment, General Manual Muscle Testing (MMT) Assessment RUE grossly 3/5, LUE grossly 4-/5  -MS       Row Name 12/03/23 1421          Balance    Balance Assessment sitting static balance;sitting dynamic balance;standing static balance;standing dynamic balance  -MS     Static Sitting Balance supervision  -MS     Dynamic Sitting Balance standby assist  -MS     Position, Sitting Balance unsupported;sitting edge of bed  -MS     Static Standing Balance contact guard  -MS     Dynamic Standing Balance contact guard;minimal assist  -MS     Position/Device Used, Standing Balance supported;walker, front-wheeled  -MS               User Key  (r) = Recorded By, (t) = Taken By, (c) = Cosigned By      Initials Name Provider Type    Corie Cavanaugh, OT Occupational Therapist                   Goals/Plan       Row Name 12/03/23 1429          Bed Mobility Goal 1 (OT)    Activity/Assistive Device (Bed Mobility Goal 1, OT) bed mobility activities, all  -MS     [place] Level/Cues Needed (Bed Mobility Goal 1, OT) modified independence  -MS     Time Frame (Bed Mobility Goal 1, OT) long term goal (LTG);2 weeks  -MS     Progress/Outcomes (Bed Mobility Goal 1, OT) new goal  -MS       Row Name 12/03/23 1429          Transfer Goal 1 (OT)    Activity/Assistive Device (Transfer Goal 1, OT) transfers, all  -MS     [place] Level/Cues Needed (Transfer Goal 1, OT) modified independence  -MS     Time Frame (Transfer Goal 1, OT) long term goal  (LTG);2 weeks  -MS     Progress/Outcome (Transfer Goal 1, OT) new goal  -MS       Row Name 12/03/23 1429          Dressing Goal 1 (OT)    Activity/Device (Dressing Goal 1, OT) lower body dressing  -MS     [place]/Cues Needed (Dressing Goal 1, OT) modified independence  -MS     Time Frame (Dressing Goal 1, OT) long term goal (LTG);2 weeks  -MS     Progress/Outcome (Dressing Goal 1, OT) new goal  -MS       Row Name 12/03/23 1429          Toileting Goal 1 (OT)    Activity/Device (Toileting Goal 1, OT) toileting skills, all  -MS     [place] Level/Cues Needed (Toileting Goal 1, OT) modified independence  -MS     Time Frame (Toileting Goal 1, OT) long term goal (LTG);2 weeks  -MS     Progress/Outcome (Toileting Goal 1, OT) new goal  -MS       Row Name 12/03/23 1429          Therapy Assessment/Plan (OT)    Planned Therapy Interventions (OT) activity tolerance training;adaptive equipment training;BADL retraining;functional balance retraining;IADL retraining;occupation/activity based interventions;passive ROM/stretching;patient/caregiver education/training;transfer/mobility retraining;strengthening exercise;ROM/therapeutic exercise  -MS               User Key  (r) = Recorded By, (t) = Taken By, (c) = Cosigned By      Initials Name Provider Type    Corie Cavanaugh, OT Occupational Therapist                   Clinical Impression       Row Name 12/03/23 1422          Pain Assessment    Pain Intervention(s) Repositioned;Emotional support  -MS     Additional Documentation Pain Scale: FACES Pre/Post-Treatment (Group)  -MS       Row Name 12/03/23 1422          Pain Scale: FACES Pre/Post-Treatment    Pain: FACES Scale, Pretreatment 2-->hurts little bit  -MS     Posttreatment Pain Rating 2-->hurts little bit  -MS     Pain Location lower  -MS     Pain Location - back  -MS       Row Name 12/03/23 1422          Plan of Care Review    Plan of Care Reviewed With patient  -MS     Progress no change  -MS     Outcome Evaluation  Pt is a 69 y/o M admitted to [facility] 12/1/23 with c/o intractable LBP and c/o falls BLE numbness. CXR (-) acute. XR Lumbar spine (+) L2 superior endplate compression fx with 45% anterior height loss and 20% posterior height loss, degenerative disc height loss at L3-L4, L4-L5 with degenerative facet arthropathy at L4-L5 and L5-S1. MRI lumbar spine (+) moderate L2 compression dx. PMHx significant for hx ETOH abuse, hx SI, COPD and depression. At baseline pt resides with sister and brother in law in multi level home with 3 MILI. Pt typically does not use AD for mobility and independent with ADLs. Pt has reports of numerous falls recently, requiring him to utilize standard walker at home. This date, pt A&O x4, on RA and in supine upon arrival. Pt c/o LBP however able to participate in functional mobility. Pt requires min A for supine to sit, min A to come to standing and CGA with RW for functional mobility. Pt demo decreased balance with dynamic standing, requiring min A at times, increasing risk for falls. Pt would benefit from short stay rehab prior to dc home. OT recommend SNF when medically appropriate for d/c. OT will follow.  -MS       Row Name 12/03/23 1422          Therapy Assessment/Plan (OT)    Rehab Potential (OT) good, to achieve stated therapy goals  -MS     Criteria for Skilled Therapeutic Interventions Met (OT) yes;meets criteria;skilled treatment is necessary  -MS     Therapy Frequency (OT) 3 times/wk  -MS     Predicted Duration of Therapy Intervention (OT) until d/c  -MS       Row Name 12/03/23 1422          Therapy Plan Review/Discharge Plan (OT)    Anticipated Discharge Disposition (OT) skilled nursing facility  -MS       Row Name 12/03/23 1422          Vital Signs    O2 Delivery Pre Treatment room air  -MS     O2 Delivery Intra Treatment room air  -MS     O2 Delivery Post Treatment room air  -MS     Pre Patient Position Supine  -MS     Intra Patient Position Standing  -MS     Post Patient Position Supine   -MS     Recovery Time VSS  -MS       Row Name 12/03/23 1422          Positioning and Restraints    Pre-Treatment Position in bed  -MS     Post Treatment Position bed  -MS     In Bed notified nsg;supine;call light within reach;encouraged to call for assist;exit alarm on  -MS               User Key  (r) = Recorded By, (t) = Taken By, (c) = Cosigned By      Initials Name Provider Type    Corie Cavanaugh OT Occupational Therapist                   Outcome Measures       Row Name 12/03/23 1429          How much help from another is currently needed...    Putting on and taking off regular lower body clothing? 2  -MS     Bathing (including washing, rinsing, and drying) 2  -MS     Toileting (which includes using toilet bed pan or urinal) 2  -MS     Putting on and taking off regular upper body clothing 3  -MS     Taking care of personal grooming (such as brushing teeth) 4  -MS     Eating meals 4  -MS     AM-PAC 6 Clicks Score (OT) 17  -MS       Row Name 12/03/23 0853          How much help from another person do you currently need...    Turning from your back to your side while in flat bed without using bedrails? 3  -AH     Moving from lying on back to sitting on the side of a flat bed without bedrails? 3  -AH     Moving to and from a bed to a chair (including a wheelchair)? 2  -AH     Standing up from a chair using your arms (e.g., wheelchair, bedside chair)? 2  -AH     Climbing 3-5 steps with a railing? 1  -AH     To walk in hospital room? 1  -AH     AM-PAC 6 Clicks Score (PT) 12  -AH     Highest Level of Mobility Goal 4 --> Transfer to chair/commode  -AH       Row Name 12/03/23 1429          Functional Assessment    Outcome Measure Options AM-PAC 6 Clicks Daily Activity (OT)  -MS               User Key  (r) = Recorded By, (t) = Taken By, (c) = Cosigned By      Initials Name Provider Type    Margie Kendall RN Registered Nurse    Corie Cavanaugh OT Occupational Therapist                    Occupational Therapy  Education       Title: PT OT SLP Therapies (Done)       Topic: Occupational Therapy (Done)       Point: ADL training (Done)       Description:   Instruct learner(s) on proper safety adaptation and remediation techniques during self care or transfers.   Instruct in proper use of assistive devices.                  Learning Progress Summary             Patient Acceptance, E,TB, VU by MS at 12/3/2023 1430                         Point: Precautions (Done)       Description:   Instruct learner(s) on prescribed precautions during self-care and functional transfers.                  Learning Progress Summary             Patient Acceptance, E,TB, VU by MS at 12/3/2023 1430                         Point: Body mechanics (Done)       Description:   Instruct learner(s) on proper positioning and spine alignment during self-care, functional mobility activities and/or exercises.                  Learning Progress Summary             Patient Acceptance, E,TB, VU by MS at 12/3/2023 1430                                         User Key       Initials Effective Dates Name Provider Type Discipline    MS 07/13/22 -  Corie Jacques, OT Occupational Therapist OT                  OT Recommendation and Plan  Planned Therapy Interventions (OT): activity tolerance training, adaptive equipment training, BADL retraining, functional balance retraining, IADL retraining, occupation/activity based interventions, passive ROM/stretching, patient/caregiver education/training, transfer/mobility retraining, strengthening exercise, ROM/therapeutic exercise  Therapy Frequency (OT): 3 times/wk  Plan of Care Review  Plan of Care Reviewed With: patient  Progress: no change  Outcome Evaluation: Pt is a 67 y/o M admitted to [facility] 12/1/23 with c/o intractable LBP and c/o falls BLE numbness. CXR (-) acute. XR Lumbar spine (+) L2 superior endplate compression fx with 45% anterior height loss and 20% posterior height loss, degenerative disc height loss at L3-L4,  L4-L5 with degenerative facet arthropathy at L4-L5 and L5-S1. MRI lumbar spine (+) moderate L2 compression dx. PMHx significant for hx ETOH abuse, hx SI, COPD and depression. At baseline pt resides with sister and brother in law in multi level home with 3 MILI. Pt typically does not use AD for mobility and independent with ADLs. Pt has reports of numerous falls recently, requiring him to utilize standard walker at home. This date, pt A&O x4, on RA and in supine upon arrival. Pt c/o LBP however able to participate in functional mobility. Pt requires min A for supine to sit, min A to come to standing and CGA with RW for functional mobility. Pt demo decreased balance with dynamic standing, requiring min A at times, increasing risk for falls. Pt would benefit from short stay rehab prior to dc home. OT recommend SNF when medically appropriate for d/c. OT will follow.     Time Calculation:                   Corie Jacques OT  12/3/2023

## 2023-12-03 NOTE — PLAN OF CARE
Problem: Skin Injury Risk Increased  Goal: Skin Health and Integrity  Outcome: Ongoing, Not Progressing  Intervention: Optimize Skin Protection  Recent Flowsheet Documentation  Taken 12/3/2023 0853 by Margie Gaona RN  Pressure Reduction Techniques: frequent weight shift encouraged  Head of Bed (HOB) Positioning: HOB at 60-90 degrees  Pressure Reduction Devices: specialty bed utilized     Problem: Adult Inpatient Plan of Care  Goal: Plan of Care Review  Outcome: Ongoing, Not Progressing  Flowsheets (Taken 12/3/2023 1508)  Progress: no change  Plan of Care Reviewed With: patient  Goal: Patient-Specific Goal (Individualized)  Outcome: Ongoing, Not Progressing  Goal: Absence of Hospital-Acquired Illness or Injury  Outcome: Ongoing, Not Progressing  Intervention: Identify and Manage Fall Risk  Recent Flowsheet Documentation  Taken 12/3/2023 1455 by Margie Gaona, RN  Safety Promotion/Fall Prevention:   assistive device/personal items within reach   clutter free environment maintained   fall prevention program maintained   nonskid shoes/slippers when out of bed   safety round/check completed   room organization consistent  Taken 12/3/2023 1243 by Margie Gaona, RN  Safety Promotion/Fall Prevention:   assistive device/personal items within reach   clutter free environment maintained   nonskid shoes/slippers when out of bed   room organization consistent   safety round/check completed  Taken 12/3/2023 1033 by Margie Gaona, RN  Safety Promotion/Fall Prevention:   assistive device/personal items within reach   clutter free environment maintained   fall prevention program maintained   nonskid shoes/slippers when out of bed   room organization consistent   safety round/check completed  Taken 12/3/2023 1003 by Margie Gaona, RN  Safety Promotion/Fall Prevention:   assistive device/personal items within reach   clutter free environment maintained   fall prevention program maintained   nonskid shoes/slippers when out of  bed   safety round/check completed   room organization consistent  Taken 12/3/2023 0853 by Margie Gaona RN  Safety Promotion/Fall Prevention:   assistive device/personal items within reach   clutter free environment maintained   fall prevention program maintained   nonskid shoes/slippers when out of bed   room organization consistent   safety round/check completed  Intervention: Prevent Skin Injury  Recent Flowsheet Documentation  Taken 12/3/2023 0853 by Margie Gaona RN  Body Position:   position changed independently   supine  Intervention: Prevent and Manage VTE (Venous Thromboembolism) Risk  Recent Flowsheet Documentation  Taken 12/3/2023 0853 by Margie Gaona RN  Activity Management: bedrest  VTE Prevention/Management: sequential compression devices off  Range of Motion: active ROM (range of motion) encouraged  Intervention: Prevent Infection  Recent Flowsheet Documentation  Taken 12/3/2023 1455 by Margie Gaona RN  Infection Prevention:   environmental surveillance performed   hand hygiene promoted   cohorting utilized   rest/sleep promoted  Taken 12/3/2023 1243 by Margie Gaona RN  Infection Prevention:   environmental surveillance performed   hand hygiene promoted   cohorting utilized  Taken 12/3/2023 1033 by Margie Gaona RN  Infection Prevention:   environmental surveillance performed   hand hygiene promoted   rest/sleep promoted   cohorting utilized  Taken 12/3/2023 1003 by Margie Gaona RN  Infection Prevention:   environmental surveillance performed   hand hygiene promoted   cohorting utilized  Taken 12/3/2023 0853 by Margie Gaona RN  Infection Prevention:   environmental surveillance performed   hand hygiene promoted   cohorting utilized  Goal: Optimal Comfort and Wellbeing  Outcome: Ongoing, Not Progressing  Intervention: Monitor Pain and Promote Comfort  Recent Flowsheet Documentation  Taken 12/3/2023 1003 by Margie Gaona RN  Pain Management Interventions: see MAR  Taken  12/3/2023 0853 by Margie Gaona RN  Pain Management Interventions: see MAR  Intervention: Provide Person-Centered Care  Recent Flowsheet Documentation  Taken 12/3/2023 0853 by Margie Gaona RN  Trust Relationship/Rapport:   care explained   choices provided  Goal: Readiness for Transition of Care  Outcome: Ongoing, Not Progressing     Problem: Mobility Impairment  Goal: Optimal Mobility  Outcome: Ongoing, Not Progressing  Intervention: Optimize Mobility  Recent Flowsheet Documentation  Taken 12/3/2023 0853 by Margie Gaona RN  Activity Management: bedrest  Assistive Device Utilized:   gait belt   walker  Positioning/Transfer Devices:   pillows   in use     Problem: Muscle Strength Impairment  Goal: Improved Muscle Strength  Outcome: Ongoing, Not Progressing  Intervention: Optimize Muscle Strength  Recent Flowsheet Documentation  Taken 12/3/2023 0853 by Margie Gaona RN  Self-Care Promotion:   independence encouraged   BADL personal objects within reach     Problem: Fall Injury Risk  Goal: Absence of Fall and Fall-Related Injury  Outcome: Ongoing, Not Progressing  Intervention: Identify and Manage Contributors  Recent Flowsheet Documentation  Taken 12/3/2023 1455 by Margie Gaona RN  Medication Review/Management:   medications reviewed   high-risk medications identified  Taken 12/3/2023 1243 by Margie Gaona RN  Medication Review/Management:   medications reviewed   high-risk medications identified  Taken 12/3/2023 1003 by Margie Gaona RN  Medication Review/Management:   medications reviewed   high-risk medications identified  Taken 12/3/2023 0853 by Margie Gaona RN  Medication Review/Management:   medications reviewed   high-risk medications identified  Self-Care Promotion:   independence encouraged   BADL personal objects within reach  Intervention: Promote Injury-Free Environment  Recent Flowsheet Documentation  Taken 12/3/2023 1455 by Margie Gaona RN  Safety Promotion/Fall Prevention:    assistive device/personal items within reach   clutter free environment maintained   fall prevention program maintained   nonskid shoes/slippers when out of bed   safety round/check completed   room organization consistent  Taken 12/3/2023 1243 by Margie Gaona RN  Safety Promotion/Fall Prevention:   assistive device/personal items within reach   clutter free environment maintained   nonskid shoes/slippers when out of bed   room organization consistent   safety round/check completed  Taken 12/3/2023 1033 by Margie Gaona RN  Safety Promotion/Fall Prevention:   assistive device/personal items within reach   clutter free environment maintained   fall prevention program maintained   nonskid shoes/slippers when out of bed   room organization consistent   safety round/check completed  Taken 12/3/2023 1003 by Margie Gaona RN  Safety Promotion/Fall Prevention:   assistive device/personal items within reach   clutter free environment maintained   fall prevention program maintained   nonskid shoes/slippers when out of bed   safety round/check completed   room organization consistent  Taken 12/3/2023 0853 by Margie Gaona RN  Safety Promotion/Fall Prevention:   assistive device/personal items within reach   clutter free environment maintained   fall prevention program maintained   nonskid shoes/slippers when out of bed   room organization consistent   safety round/check completed   Goal Outcome Evaluation:  Plan of Care Reviewed With: patient        Progress: no change     Alert and oriented x 4. Able to verbalize needs and wants. Takes medication whole and tolerates well. Continent of bowel and bladder. C/O lower back pain, PRN Norco administered with positive effect noted. Compliant with fluid restriction. Does not require O2 therapy at this time. Assist x 1 for transfers. Seen by OT this shift. Continues to be followed by neurosurgery-started on IV steroids and awaiting TLSO brace. Currently in bed, awake. Call  bell in reach. Discharge plan: OT recommending SNF placement.

## 2023-12-03 NOTE — PLAN OF CARE
Goal Outcome Evaluation:              Outcome Evaluation: Patient slept well during the night.  Waiting on specialist to look at scan.  Waiting on placement.  Pt stable at this time.

## 2023-12-03 NOTE — PROGRESS NOTES
LOS: 0 days   Patient Care Team:  Jeff Jacques MD as PCP - General (Urology)        Subjective  - Lying on bed    Interval History:  None    Patient Complaints: c/o pain with activity    Review of Systems   Constitutional:  Positive for activity change and fatigue.   Eyes: Negative.    Respiratory: Negative.     Cardiovascular: Negative.    Gastrointestinal: Negative.    Endocrine: Negative.    Genitourinary:  Positive for frequency.   Musculoskeletal:  Positive for back pain and gait problem.   Neurological:  Positive for weakness.   Psychiatric/Behavioral:  Positive for sleep disturbance. The patient is nervous/anxious.         Objective     Vital Signs  Temp:  [97.3 °F (36.3 °C)-98.7 °F (37.1 °C)] 98.4 °F (36.9 °C)  Heart Rate:  [69-80] 69  Resp:  [16-18] 18  BP: (128-165)/(66-82) 164/79    Physical Exam:     General Appearance:    Alert, cooperative, in no acute distress   Ears:    Ears appear intact with no abnormalities noted   Throat:   No oral lesions, no thrush, oral mucosa moist   Neck:   No adenopathy, supple, trachea midline, no thyromegaly, no   carotid bruit, no JVD   Lungs:     Clear to auscultation, respirations regular, even and               Unlabored     Heart:    Regular rhythm and normal rate, normal S1 and S2, no          murmur, no gallop, no rub, no click   Abdomen:     Normal bowel sounds, no masses, no organomegaly, soft      nontender, nondistended, no guarding, no rebound                tenderness   Extremities:   Moves all extremities well, no edema, no cyanosis, no           redness   Skin:   No bleeding, bruising or rash   Neurologic:   Cranial nerves 2 - 12 grossly intact, sensation intact, DTR       present and equal bilaterally        Results Review:    Lab Results (last 24 hours)       Procedure Component Value Units Date/Time    Comprehensive Metabolic Panel [[id number]]  (Abnormal) Collected: 12/02/23 2237    Specimen: Blood Updated: 12/02/23 2341     Glucose 90 mg/dL       BUN 10 mg/dL      Creatinine 0.88 mg/dL      Sodium 132 mmol/L      Potassium 4.4 mmol/L      Chloride 96 mmol/L      CO2 28.0 mmol/L      Calcium 8.5 mg/dL      Total Protein 5.9 g/dL      Albumin 2.8 g/dL      ALT (SGPT) 12 U/L      AST (SGOT) 18 U/L      Alkaline Phosphatase 109 U/L      Total Bilirubin <0.2 mg/dL      Globulin 3.1 gm/dL      A/G Ratio 0.9 g/dL      BUN/Creatinine Ratio 11.4     Anion Gap 8.0 mmol/L      eGFR 94.2 mL/min/1.73     Narrative:      GFR Normal >60  Chronic Kidney Disease <60  Kidney Failure <15      CBC & Differential [[id number]]  (Abnormal) Collected: 12/02/23 2237    Specimen: Blood Updated: 12/02/23 2323    Narrative:      The following orders were created for panel order CBC & Differential.  Procedure                               Abnormality         Status                     ---------                               -----------         ------                     CBC Auto Differential[[id number]]        Abnormal            Final result                 Please view results for these tests on the individual orders.    CBC Auto Differential [[id number]]  (Abnormal) Collected: 12/02/23 2237    Specimen: Blood Updated: 12/02/23 2323     WBC 5.90 10*3/mm3      RBC 4.35 10*6/mm3      Hemoglobin 11.4 g/dL      Hematocrit 34.2 %      MCV 78.7 fL      MCH 26.2 pg      MCHC 33.3 g/dL      RDW 15.3 %      RDW-SD 42.0 fl      MPV 6.5 fL      Platelets 555 10*3/mm3      Neutrophil % 50.7 %      Lymphocyte % 26.9 %      Monocyte % 14.2 %      Eosinophil % 6.9 %      Basophil % 1.3 %      Neutrophils, Absolute 3.00 10*3/mm3      Lymphocytes, Absolute 1.60 10*3/mm3      Monocytes, Absolute 0.80 10*3/mm3      Eosinophils, Absolute 0.40 10*3/mm3      Basophils, Absolute 0.10 10*3/mm3      nRBC 0.0 /100 WBC            Imaging Results (Last 24 Hours)       ** No results found for the last 24 hours. **             I reviewed the patient's other test results and agree with the  interpretation    Medication Review:     Current Facility-Administered Medications:     aluminum-magnesium hydroxide-simethicone (MAALOX MAX) 400-400-40 MG/5ML suspension 15 mL, 15 mL, Oral, Q6H PRN, Danuta Dillon MD    sennosides-docusate (PERICOLACE) 8.6-50 MG per tablet 2 tablet, 2 tablet, Oral, BID, 2 tablet at 12/03/23 1003 **AND** polyethylene glycol (MIRALAX) packet 17 g, 17 g, Oral, Daily PRN **AND** bisacodyl (DULCOLAX) EC tablet 5 mg, 5 mg, Oral, Daily PRN **AND** bisacodyl (DULCOLAX) suppository 10 mg, 10 mg, Rectal, Daily PRN, Danuta Dillon MD    Calcium Replacement - Follow Nurse / BPA Driven Protocol, , Does not apply, PRN, Danuta Dillon MD    carBAMazepine (TEGretol) tablet 200 mg, 200 mg, Oral, BID, Danuta Dillon MD, 200 mg at 12/03/23 1003    desmopressin (DDAVP) tablet 400 mcg, 400 mcg, Oral, Nightly, Danuta Dillon MD, 400 mcg at 12/02/23 2008    DULoxetine (CYMBALTA) DR capsule 60 mg, 60 mg, Oral, Daily, Danuta Dillon MD, 60 mg at 12/03/23 1003    Enoxaparin Sodium (LOVENOX) syringe 40 mg, 40 mg, Subcutaneous, Q24H, Danuta Dillon MD, 40 mg at 12/03/23 1641    famotidine (PEPCID) tablet 40 mg, 40 mg, Oral, Daily, Danuta Dillon MD, 40 mg at 12/03/23 1003    hydrALAZINE (APRESOLINE) tablet 50 mg, 50 mg, Oral, TID, Danuta Dillon MD, 50 mg at 12/03/23 1641    HYDROcodone-acetaminophen (NORCO) 7.5-325 MG per tablet 1 tablet, 1 tablet, Oral, Q6H PRN, Danuta Dillon MD, 1 tablet at 12/03/23 1641    Hydrocortisone Sod Suc (PF) (Solu-CORTEF) injection 250 mg, 250 mg, Intravenous, Q6H, Hussain Maloney MD, 250 mg at 12/03/23 1640    Influenza Vac High-Dose Quad (FLUZONE HIGH DOSE) injection 0.7 mL, 0.7 mL, Intramuscular, During Hospitalization, Danuta Dillon MD    levothyroxine (SYNTHROID, LEVOTHROID) tablet 50 mcg, 50 mcg, Oral, Q AM, Danuta Dillon MD, 50 mcg at 12/03/23 0507    Magnesium  Standard Dose Replacement - Follow Nurse / BPA Driven Protocol, , Does not apply, Wilma VELEZ Dhamayanthi, MD    ondansetron (ZOFRAN) injection 4 mg, 4 mg, Intravenous, Q6H PRN, Danuta Dillon MD    Phosphorus Replacement - Follow Nurse / BPA Driven Protocol, , Does not apply, Wilma VELEZ Dhamayanthi, MD    Potassium Replacement - Follow Nurse / BPA Driven Protocol, , Does not apply, Wilma VELEZ Dhamayanthi, MD    sodium chloride 0.9 % flush 10 mL, 10 mL, Intravenous, Q12H, Danuta Dillon MD, 10 mL at 12/03/23 1005    sodium chloride 0.9 % flush 10 mL, 10 mL, Intravenous, PRN, Danuta Dillon MD    sodium chloride 0.9 % infusion 40 mL, 40 mL, Intravenous, PRN, Danuta Dillon MD    I have reviewed medication list.    Assessment & Plan     Principal Problem:   Intractable low back pain  - Pain control    BLE weakness - spine surgery consult    Abnormal MRI of L-S Spine - spine surgery consult    L2 compression Fx -  Spine surgery consulted, plan to start on IV Steroid, if not better to have kyphoplasty tomorrow     Hypothyroid  - Elevated TSH, started pt on synthroid    COPD  - Stable    Major recurrent depression with psychotic behavioral - continue home medicine    Unsteady Gait with multiple Falls - PT consult and may need short term rehab     Stress ulcer/DVT prophylaxis            Plan for disposition: may need rehab at discharge     Danuta Dillon MD  12/03/23  17:16 EST

## 2023-12-03 NOTE — THERAPY EVALUATION
Patient Name: Marek Wu  : 1955    MRN: [id number]                              Today's Date: 12/3/2023       Admit Date: 2023    Visit Dx: No diagnosis found.  Patient Active Problem List   Diagnosis    Intractable low back pain     History reviewed. No pertinent past medical history.  History reviewed. No pertinent surgical history.   General Information       Row Name 23 1624          Physical Therapy Time and Intention    Document Type evaluation  -     Mode of Treatment physical therapy  -       Row Name 23 1624          General Information    Patient Profile Reviewed yes  -SS     Prior Level of Function independent:  -     Existing Precautions/Restrictions fall;spinal  -     Barriers to Rehab previous functional deficit  -       Row Name 23 1624          Living Environment    People in Home sibling(s)  sister and brother in law  -       Row Name 23 1624          Home Main Entrance    Number of Stairs, Main Entrance three  -       Row Name 23 1624          Stairs Within Home, Primary    Stairs, Within Home, Primary can perform ADLs on first level  -       Row Name 23 1624          Cognition    Orientation Status (Cognition) oriented x 4  tangential and loquacious  -       Row Name 23 1624          Safety Issues, Functional Mobility    Impairments Affecting Function (Mobility) balance;endurance/activity tolerance;pain;range of motion (ROM);strength  -               User Key  (r) = Recorded By, (t) = Taken By, (c) = Cosigned By      Initials Name Provider Type     Cristal Lizama PT Physical Therapist                   Mobility       Row Name 23 1620          Bed Mobility    Supine-Sit [place] (Bed Mobility) minimum assist (75% patient effort)  -     Sit-Supine [place] (Bed Mobility) standby assist  -     Assistive Device (Bed Mobility) bed rails;head of bed elevated  -       Row Name 23 1624           Sit-Stand Transfer    Sit-Stand [place] (Transfers) minimum assist (75% patient effort)  -     Assistive Device (Sit-Stand Transfers) walker, front-wheeled  -       Row Name 12/03/23 1625          Gait/Stairs (Locomotion)    [place] Level (Gait) minimum assist (75% patient effort)  -     Assistive Device (Gait) walker, front-wheeled  -SS     Distance in Feet (Gait) 10  -SS     Comment, (Gait/Stairs) significantly decreased gait speed, short stride length, cues to remain on task  -               User Key  (r) = Recorded By, (t) = Taken By, (c) = Cosigned By      Initials Name Provider Type     Cristal Lizama, PT Physical Therapist                   Obj/Interventions       Row Name 12/03/23 1626          Range of Motion Comprehensive    Comment, General Range of Motion B LE limited 2/2 to back pain  -       Row Name 12/03/23 1626          Strength Comprehensive (MMT)    Comment, General Manual Muscle Testing (MMT) Assessment B LE >3/5  -       Row Name 12/03/23 1626          Sensory Assessment (Somatosensory)    Sensory Assessment (Somatosensory) --  reports neuropathy  -               User Key  (r) = Recorded By, (t) = Taken By, (c) = Cosigned By      Initials Name Provider Type     Cristal Lizama, PT Physical Therapist                   Goals/Plan       Row Name 12/03/23 1627          Bed Mobility Goal 1 (PT)    Activity/Assistive Device (Bed Mobility Goal 1, PT) bed mobility activities, all  -     [place] Level/Cues Needed (Bed Mobility Goal 1, PT) modified independence  -SS     Time Frame (Bed Mobility Goal 1, PT) long term goal (LTG);2 weeks  -       Row Name 12/03/23 1627          Transfer Goal 1 (PT)    Activity/Assistive Device (Transfer Goal 1, PT) transfers, all;walker, rolling  -     [place] Level/Cues Needed (Transfer Goal 1, PT) modified independence  -SS     Time Frame (Transfer Goal 1, PT) long term goal (LTG);2 weeks  -       Row Name  12/03/23 1627          Gait Training Goal 1 (PT)    Activity/Assistive Device (Gait Training Goal 1, PT) gait (walking locomotion);walker, rolling  -SS     [place] Level (Gait Training Goal 1, PT) modified independence  -SS     Distance (Gait Training Goal 1, PT) 50'  -SS     Time Frame (Gait Training Goal 1, PT) long term goal (LTG);2 weeks  -SS       Row Name 12/03/23 1627          Therapy Assessment/Plan (PT)    Planned Therapy Interventions (PT) balance training;bed mobility training;gait training;home exercise program;transfer training;patient/family education;strengthening  -SS               User Key  (r) = Recorded By, (t) = Taken By, (c) = Cosigned By      Initials Name Provider Type    SS Cristal Lizama, PT Physical Therapist                   Clinical Impression       Row Name 12/03/23 1626          Pain Scale: FACES Pre/Post-Treatment    Pain: FACES Scale, Pretreatment 2-->hurts little bit  -SS     Posttreatment Pain Rating 2-->hurts little bit  -SS       Row Name 12/03/23 1631 12/03/23 1626       Plan of Care Review    Plan of Care Reviewed With -- patient  -SS    Outcome Evaluation 67 y/o M who presented with c/o intractable LBP and c/o falls BLE numbness. CXR (-) acute. XR Lumbar spine (+) L2 superior endplate compression fx with 45% anterior height loss and 20% posterior height loss, degenerative disc height loss at L3-L4, L4-L5 with degenerative facet arthropathy at L4-L5 and L5-S1. MRI lumbar spine (+) moderate L2 compression dx. PMHx significant for hx ETOH abuse, hx SI, COPD and depression. At baseline pt resides with sister and brother in law in multi level home with 3 MILI. Pt typically does not use AD for mobility and independent with ADLs. Pt has reports of numerous falls recently, requiring him to utilize standard walker at home. This date, pt A&O x4, on RA and in supine upon arrival. Pt c/o LBP however able to participate in functional mobility. Pt requires min A for supine to sit,  min A to come to standing and CGA with RW for functional mobility. Pt demo decreased balance with dynamic standing, requiring min A at times, increasing risk for falls. Significant decreased gait speed, cues for sequencing, fear of falling. Recommending SNF at d/c as pt is not safe to return home at this time.  - --      Row Name 12/03/23 1626          Therapy Assessment/Plan (PT)    Rehab Potential (PT) good, to achieve stated therapy goals  -SS     Criteria for Skilled Interventions Met (PT) yes;meets criteria  -SS     Therapy Frequency (PT) 5 times/wk  -SS     Predicted Duration of Therapy Intervention (PT) until d/c  -       Row Name 12/03/23 1626          Positioning and Restraints    Pre-Treatment Position in bed  -SS     Post Treatment Position bed  -SS     In Bed exit alarm on  -               User Key  (r) = Recorded By, (t) = Taken By, (c) = Cosigned By      Initials Name Provider Type     Cristal Lizama, PT Physical Therapist                   Outcome Measures       Row Name 12/03/23 0853          How much help from another person do you currently need...    Turning from your back to your side while in flat bed without using bedrails? 3  -AH     Moving from lying on back to sitting on the side of a flat bed without bedrails? 3  -AH     Moving to and from a bed to a chair (including a wheelchair)? 2  -AH     Standing up from a chair using your arms (e.g., wheelchair, bedside chair)? 2  -AH     Climbing 3-5 steps with a railing? 1  -AH     To walk in hospital room? 1  -AH     AM-PAC 6 Clicks Score (PT) 12  -     Highest Level of Mobility Goal 4 --> Transfer to chair/commode  -       Row Name 12/03/23 1429          Functional Assessment    Outcome Measure Options AM-PAC 6 Clicks Daily Activity (OT)  -MS               User Key  (r) = Recorded By, (t) = Taken By, (c) = Cosigned By      Initials Name Provider Type     Margie Gaona, RN Registered Nurse    Corie Cavanaugh, OT Occupational  Therapist                                 Physical Therapy Education       Title: PT OT SLP Therapies (Done)       Topic: Physical Therapy (Done)       Point: Mobility training (Done)       Learning Progress Summary             Patient Acceptance, E, VU by  at 12/3/2023 2040                                         User Key       Initials Effective Dates Name Provider Type Discipline     06/16/21 -  Cristal Lizama, PT Physical Therapist PT                  PT Recommendation and Plan  Planned Therapy Interventions (PT): balance training, bed mobility training, gait training, home exercise program, transfer training, patient/family education, strengthening  Plan of Care Reviewed With: patient  Outcome Evaluation: 67 y/o M who presented with c/o intractable LBP and c/o falls BLE numbness. CXR (-) acute. XR Lumbar spine (+) L2 superior endplate compression fx with 45% anterior height loss and 20% posterior height loss, degenerative disc height loss at L3-L4, L4-L5 with degenerative facet arthropathy at L4-L5 and L5-S1. MRI lumbar spine (+) moderate L2 compression dx. PMHx significant for hx ETOH abuse, hx SI, COPD and depression. At baseline pt resides with sister and brother in law in multi level home with 3 MILI. Pt typically does not use AD for mobility and independent with ADLs. Pt has reports of numerous falls recently, requiring him to utilize standard walker at home. This date, pt A&O x4, on RA and in supine upon arrival. Pt c/o LBP however able to participate in functional mobility. Pt requires min A for supine to sit, min A to come to standing and CGA with RW for functional mobility. Pt demo decreased balance with dynamic standing, requiring min A at times, increasing risk for falls. Significant decreased gait speed, cues for sequencing, fear of falling. Recommending SNF at d/c as pt is not safe to return home at this time.     Time Calculation:   PT Evaluation Complexity  History, PT Evaluation  Complexity: 3 or more personal factors and/or comorbidities  Examination of Body Systems (PT Eval Complexity): total of 3 or more elements  Clinical Presentation (PT Evaluation Complexity): evolving  Clinical Decision Making (PT Evaluation Complexity): moderate complexity  Overall Complexity (PT Evaluation Complexity): moderate complexity     PT Charges       Row Name 12/03/23 1629             Time Calculation    Start Time 1110  -SS      Stop Time 1140  -SS      Time Calculation (min) 30 min  -SS      PT Received On 12/03/23  -      PT - Next Appointment 12/04/23  -      PT Goal Re-Cert Due Date 12/17/23  -         Time Calculation- PT    Total Timed Code Minutes- PT 0 minute(s)  -                User Key  (r) = Recorded By, (t) = Taken By, (c) = Cosigned By      Initials Name Provider Type    SS Cristal Lizama, PT Physical Therapist                  Therapy Charges for Today       Code Description Service Date Service Provider Modifiers Qty    48645713478 HC PT EVAL MOD COMPLEXITY 4 12/3/2023 Cristal Lizama, PT GP 1            PT G-Codes  Outcome Measure Options: AM-PAC 6 Clicks Daily Activity (OT)  AM-PAC 6 Clicks Score (PT): 12  AM-PAC 6 Clicks Score (OT): 17  PT Discharge Summary  Anticipated Discharge Disposition (PT): skilled nursing facility    Cristal Lizama PT  12/3/2023

## 2023-12-03 NOTE — PROGRESS NOTES
LOS: 0 days   Patient Care Team:  Jeff Jacques MD as PCP - General (Urology)    Chief Complaint: Severe back pain    Subjective     This patient continues with pain in his back.  Is really no better at all.  He has no radiation into his legs.    Interval History:     History taken from: patient    Objective      Patient has good movement of both lower extremities    Vital Signs  Temp:  [97.3 °F (36.3 °C)-98.7 °F (37.1 °C)] 97.3 °F (36.3 °C)  Heart Rate:  [69-80] 69  Resp:  [16-18] 16  BP: (128-165)/(66-82) 128/66       Results Review:     I reviewed the patient's new clinical results.  I reviewed his MRI which was done yesterday.  This shows an L2 compression fracture with some resultant stenosis but it is not severe.      Assessment & Plan       Intractable low back pain      I told the patient we will try him on some IV steroids today just to see if that helps his pain.  He is morbidly obese and so a kyphoplasty is going to be more difficult.  We can also try brace.  I will have one of my partners see him tomorrow and if he is no better we could consider a kyphoplasty at that time.      Hussain Maloney MD  12/03/23  10:11 EST

## 2023-12-04 LAB
ALBUMIN SERPL-MCNC: 3.3 G/DL (ref 3.5–5.2)
ALBUMIN/GLOB SERPL: 0.9 G/DL
ALP SERPL-CCNC: 120 U/L (ref 39–117)
ALT SERPL W P-5'-P-CCNC: 11 U/L (ref 1–41)
ANION GAP SERPL CALCULATED.3IONS-SCNC: 9 MMOL/L (ref 5–15)
AST SERPL-CCNC: 14 U/L (ref 1–40)
BASOPHILS # BLD AUTO: 0 10*3/MM3 (ref 0–0.2)
BASOPHILS NFR BLD AUTO: 0.2 % (ref 0–1.5)
BILIRUB SERPL-MCNC: 0.2 MG/DL (ref 0–1.2)
BUN SERPL-MCNC: 13 MG/DL (ref 8–23)
BUN/CREAT SERPL: 14.3 (ref 7–25)
CALCIUM SPEC-SCNC: 9.2 MG/DL (ref 8.6–10.5)
CHLORIDE SERPL-SCNC: 97 MMOL/L (ref 98–107)
CO2 SERPL-SCNC: 26 MMOL/L (ref 22–29)
CREAT SERPL-MCNC: 0.91 MG/DL (ref 0.76–1.27)
DEPRECATED RDW RBC AUTO: 42 FL (ref 37–54)
EGFRCR SERPLBLD CKD-EPI 2021: 92.4 ML/MIN/1.73
EOSINOPHIL # BLD AUTO: 0 10*3/MM3 (ref 0–0.4)
EOSINOPHIL NFR BLD AUTO: 0.1 % (ref 0.3–6.2)
ERYTHROCYTE [DISTWIDTH] IN BLOOD BY AUTOMATED COUNT: 15.5 % (ref 12.3–15.4)
GLOBULIN UR ELPH-MCNC: 3.5 GM/DL
GLUCOSE SERPL-MCNC: 139 MG/DL (ref 65–99)
HCT VFR BLD AUTO: 37.1 % (ref 37.5–51)
HGB BLD-MCNC: 12.5 G/DL (ref 13–17.7)
LYMPHOCYTES # BLD AUTO: 0.9 10*3/MM3 (ref 0.7–3.1)
LYMPHOCYTES NFR BLD AUTO: 13.2 % (ref 19.6–45.3)
MCH RBC QN AUTO: 26.9 PG (ref 26.6–33)
MCHC RBC AUTO-ENTMCNC: 33.7 G/DL (ref 31.5–35.7)
MCV RBC AUTO: 79.7 FL (ref 79–97)
MONOCYTES # BLD AUTO: 0.1 10*3/MM3 (ref 0.1–0.9)
MONOCYTES NFR BLD AUTO: 2 % (ref 5–12)
NEUTROPHILS NFR BLD AUTO: 6 10*3/MM3 (ref 1.7–7)
NEUTROPHILS NFR BLD AUTO: 84.5 % (ref 42.7–76)
NRBC BLD AUTO-RTO: 0 /100 WBC (ref 0–0.2)
PLATELET # BLD AUTO: 594 10*3/MM3 (ref 140–450)
PMV BLD AUTO: 6.6 FL (ref 6–12)
POTASSIUM SERPL-SCNC: 4.2 MMOL/L (ref 3.5–5.2)
PROT SERPL-MCNC: 6.8 G/DL (ref 6–8.5)
RBC # BLD AUTO: 4.65 10*6/MM3 (ref 4.14–5.8)
SODIUM SERPL-SCNC: 132 MMOL/L (ref 136–145)
WBC NRBC COR # BLD AUTO: 7.1 10*3/MM3 (ref 3.4–10.8)

## 2023-12-04 PROCEDURE — 25010000002 METHYLPREDNISOLONE PER 40 MG: Performed by: FAMILY MEDICINE

## 2023-12-04 PROCEDURE — 25010000002 HYDROCORTISONE SOD SUC (PF) 250 MG RECONSTITUTED SOLUTION: Performed by: NEUROLOGICAL SURGERY

## 2023-12-04 PROCEDURE — 80053 COMPREHEN METABOLIC PANEL: CPT | Performed by: FAMILY MEDICINE

## 2023-12-04 PROCEDURE — 99231 SBSQ HOSP IP/OBS SF/LOW 25: CPT | Performed by: NURSE PRACTITIONER

## 2023-12-04 PROCEDURE — 85025 COMPLETE CBC W/AUTO DIFF WBC: CPT | Performed by: FAMILY MEDICINE

## 2023-12-04 PROCEDURE — 25010000002 ENOXAPARIN PER 10 MG: Performed by: FAMILY MEDICINE

## 2023-12-04 RX ORDER — DEXAMETHASONE 1.5 MG/1
1.5 TABLET ORAL TAKE AS DIRECTED
Qty: 35 TABLET | Refills: 0 | Status: SHIPPED | OUTPATIENT
Start: 2023-12-04

## 2023-12-04 RX ORDER — METHYLPREDNISOLONE SODIUM SUCCINATE 40 MG/ML
40 INJECTION, POWDER, LYOPHILIZED, FOR SOLUTION INTRAMUSCULAR; INTRAVENOUS EVERY 6 HOURS
Status: DISCONTINUED | OUTPATIENT
Start: 2023-12-04 | End: 2023-12-06 | Stop reason: HOSPADM

## 2023-12-04 RX ADMIN — HYDROCORTISONE SODIUM SUCCINATE 250 MG: 250 INJECTION, POWDER, FOR SOLUTION INTRAMUSCULAR; INTRAVENOUS at 05:52

## 2023-12-04 RX ADMIN — CARBAMAZEPINE 200 MG: 200 TABLET ORAL at 20:02

## 2023-12-04 RX ADMIN — DULOXETINE HYDROCHLORIDE 60 MG: 30 CAPSULE, DELAYED RELEASE ORAL at 08:08

## 2023-12-04 RX ADMIN — ENOXAPARIN SODIUM 40 MG: 100 INJECTION SUBCUTANEOUS at 18:07

## 2023-12-04 RX ADMIN — Medication 10 ML: at 08:08

## 2023-12-04 RX ADMIN — DESMOPRESSIN ACETATE 400 MCG: 0.2 TABLET ORAL at 20:02

## 2023-12-04 RX ADMIN — HYDRALAZINE HYDROCHLORIDE 50 MG: 25 TABLET, FILM COATED ORAL at 18:07

## 2023-12-04 RX ADMIN — DOCUSATE SODIUM 50 MG AND SENNOSIDES 8.6 MG 2 TABLET: 8.6; 5 TABLET, FILM COATED ORAL at 08:08

## 2023-12-04 RX ADMIN — LEVOTHYROXINE SODIUM 50 MCG: 50 TABLET ORAL at 05:52

## 2023-12-04 RX ADMIN — HYDROCORTISONE SODIUM SUCCINATE 250 MG: 250 INJECTION, POWDER, FOR SOLUTION INTRAMUSCULAR; INTRAVENOUS at 12:04

## 2023-12-04 RX ADMIN — CARBAMAZEPINE 200 MG: 200 TABLET ORAL at 08:08

## 2023-12-04 RX ADMIN — HYDRALAZINE HYDROCHLORIDE 50 MG: 25 TABLET, FILM COATED ORAL at 20:02

## 2023-12-04 RX ADMIN — FAMOTIDINE 40 MG: 20 TABLET ORAL at 08:08

## 2023-12-04 RX ADMIN — Medication 10 ML: at 20:02

## 2023-12-04 RX ADMIN — HYDRALAZINE HYDROCHLORIDE 50 MG: 25 TABLET, FILM COATED ORAL at 08:08

## 2023-12-04 RX ADMIN — METHYLPREDNISOLONE SODIUM SUCCINATE 40 MG: 40 INJECTION, POWDER, FOR SOLUTION INTRAMUSCULAR; INTRAVENOUS at 20:00

## 2023-12-04 NOTE — CASE MANAGEMENT/SOCIAL WORK
Social Work Assessment  [facility]     Patient Name: Marek Wu  MRN: [id number]  Today's Date: 12/4/2023    Admit Date: 12/1/2023         Discharge Plan       Row Name 12/04/23 1416       Plan    Plan Referral to [facility] pending. (routine home with sister) [facility] approved for SNF, (exempted 1&2)      VANNESSA Romero, MSW    Phone: [phone]  Fax: [phone]  Email: [email or web address]

## 2023-12-04 NOTE — CASE MANAGEMENT/SOCIAL WORK
Discharge Planning Assessment   Jose L     Patient Name: Marek Wu  MRN: [id number]  Today's Date: 12/4/2023    Admit Date: 12/1/2023    Plan: DC PLAN: Referral to [facility] pending. (from routine home with sister)       Discharge Needs Assessment       Row Name 12/04/23 1041       Living Environment    People in Home sibling(s)    Current Living Arrangements home    Potentially Unsafe Housing Conditions none    In the past 12 months has the electric, gas, oil, or water company threatened to shut off services in your home? No    Primary Care Provided by self    Family Caregiver if Needed none    Able to Return to Prior Arrangements no       Resource/Environmental Concerns    Resource/Environmental Concerns none       Food Insecurity    Within the past 12 months, you worried that your food would run out before you got the money to buy more. Never true    Within the past 12 months, the food you bought just didn't last and you didn't have money to get more. Never true       Transition Planning    Patient/Family Anticipates Transition to home with family    Patient/Family Anticipated Services at Transition     Transportation Anticipated family or friend will provide       Discharge Needs Assessment    Equipment Currently Used at Home cane, straight;other (see comments)  Back brace.                   Discharge Plan       Row Name 12/04/23 1041       Plan    Plan DC PLAN: Referral to [facility] pending. (from routine home with sister)    Patient/Family in Agreement with Plan yes    Plan Comments Met with patient at bedside, 2 patient room, verified with patient okay to ask questions. From routine home with sister and brother in law. Usually independent with ADL's uses a cane. Below baseline at this point. PCP is Wilma. Pharmacy is [facility]. Able to afford medications, denies any transportation issues, family will provide. PT recomends SNF. Provided list of facilities and DCP  report sent and message sent to or[facility] Fernando per patient request. Pending response. Pending Neuro consult and IV Steroids.                  Continued Care and Services - Admitted Since 12/1/2023    Coordination has not been started for this encounter.       Expected Discharge Date and Time       Expected Discharge Date Expected Discharge Time    Dec 5, 2023            Demographic Summary       Row Name 12/04/23 1040       General Information    Admission Type inpatient    Referral Source admission list    Reason for Consult discharge planning    Preferred Language English       Contact Information    Permission Granted to Share Info With     Contact Information Obtained for                    Functional Status       Row Name 12/04/23 1040       Functional Status    Usual Activity Tolerance moderate    Current Activity Tolerance moderate       Assessment of Health Literacy    How often do you have someone help you read hospital materials? Sometimes    How often do you have problems learning about your medical condition because of difficulty understanding written information? Sometimes    How often do you have a problem understanding what is told to you about your medical condition? Sometimes    How confident are you filling out medical forms by yourself? Somewhat    Health Literacy Moderate       Functional Status, IADL    Medications assistive equipment and person    Meal Preparation assistive equipment and person    Housekeeping assistive equipment and person    Laundry assistive equipment and person    Shopping assistive equipment and person    IADL Comments Lives with sister with brother in law       Mental Status    General Appearance WDL WDL                       Patient Forms       Row Name 12/04/23 1035       Patient Forms    Important Message from Medicare (IMM) Delivered  IMM 12/4/23 per cm    Delivered to Patient    Method of delivery In person                  Estela Felder,  RN

## 2023-12-04 NOTE — PROGRESS NOTES
NEUROSURGERY PROGRESS NOTE     LOS: 1 day   Patient Care Team:  Jeff Jacques MD as PCP - General (Urology)    Chief Complaint: Back pain    Subjective     Interval History:     Patient has no acute neurologic complaints.  He feels his back pain is somewhat better.  He did receive his back brace but has not been up ambulatory with that yet.  He reports no hip or leg pain.  He reports that he has had back pain for quite some time and fell about a month ago.  He basically reports that he has been nonmobile for several weeks.      History taken from: patient chart    Objective      Vital Signs  Temp:  [97.6 °F (36.4 °C)-98.6 °F (37 °C)] 98.6 °F (37 °C)  Heart Rate:  [80-81] 80  Resp:  [16-18] 16  BP: (127-164)/(62-79) 138/62  Body mass index is 31.66 kg/m².    Intake/Output last 3 shifts:  I/O last 3 completed shifts:  In: 1529 [P.O.:1529]  Out: 1475 [Urine:1475]    Intake/Output this shift:  I/O this shift:  In: 220 [P.O.:220]  Out: 120 [Urine:120]    Physical    General:  Awake, alert, and oriented x 3. NAD  Motor:   Normal muscle strength, bulk and tone in upper and lower extremities.  No fasciculations, rigidity, spasticity, or abnormal movements.  Back:   Negative point tenderness to palpation laong upper and mid bar spine  Sensation:  Normal to light touch    Extremities:   Patient is wearing SCD bilaterally    Results Review:     I reviewed the patient's new clinical results.  I reviewed the patient's new imaging results and agree with the interpretation.    Labs:    Lab Results (last 24 hours)       Procedure Component Value Units Date/Time    Comprehensive Metabolic Panel [[id number]]  (Abnormal) Collected: 12/04/23 0135    Specimen: Blood Updated: 12/04/23 0222     Glucose 139 mg/dL      BUN 13 mg/dL      Creatinine 0.91 mg/dL      Sodium 132 mmol/L      Potassium 4.2 mmol/L      Chloride 97 mmol/L      CO2 26.0 mmol/L      Calcium 9.2 mg/dL      Total Protein 6.8 g/dL      Albumin 3.3 g/dL      ALT (SGPT)  11 U/L      AST (SGOT) 14 U/L      Alkaline Phosphatase 120 U/L      Total Bilirubin 0.2 mg/dL      Globulin 3.5 gm/dL      A/G Ratio 0.9 g/dL      BUN/Creatinine Ratio 14.3     Anion Gap 9.0 mmol/L      eGFR 92.4 mL/min/1.73     Narrative:      GFR Normal >60  Chronic Kidney Disease <60  Kidney Failure <15      CBC & Differential [[id number]]  (Abnormal) Collected: 12/04/23 0135    Specimen: Blood Updated: 12/04/23 0202    Narrative:      The following orders were created for panel order CBC & Differential.  Procedure                               Abnormality         Status                     ---------                               -----------         ------                     CBC Auto Differential[[id number]]        Abnormal            Final result                 Please view results for these tests on the individual orders.    CBC Auto Differential [[id number]]  (Abnormal) Collected: 12/04/23 0135    Specimen: Blood Updated: 12/04/23 0202     WBC 7.10 10*3/mm3      RBC 4.65 10*6/mm3      Hemoglobin 12.5 g/dL      Hematocrit 37.1 %      MCV 79.7 fL      MCH 26.9 pg      MCHC 33.7 g/dL      RDW 15.5 %      RDW-SD 42.0 fl      MPV 6.6 fL      Platelets 594 10*3/mm3      Neutrophil % 84.5 %      Lymphocyte % 13.2 %      Monocyte % 2.0 %      Eosinophil % 0.1 %      Basophil % 0.2 %      Neutrophils, Absolute 6.00 10*3/mm3      Lymphocytes, Absolute 0.90 10*3/mm3      Monocytes, Absolute 0.10 10*3/mm3      Eosinophils, Absolute 0.00 10*3/mm3      Basophils, Absolute 0.00 10*3/mm3      nRBC 0.0 /100 WBC             Imaging:    I personally reviewed the images from the following radiographic studies.    MRI lumbar spine 12/2/2023    Moderate compression deformity at L2 with some mild marrow edema and no obvious extension into posterior elements.  There is posterior buckling no significant retropulsion adding to his already chronic mild to moderate stenosis at this level.        Current Medications:   Scheduled  Meds:carBAMazepine, 200 mg, Oral, BID  desmopressin, 400 mcg, Oral, Nightly  DULoxetine, 60 mg, Oral, Daily  enoxaparin, 40 mg, Subcutaneous, Q24H  famotidine, 40 mg, Oral, Daily  hydrALAZINE, 50 mg, Oral, TID  hydrocortisone sodium succinate, 250 mg, Intravenous, Q6H  levothyroxine, 50 mcg, Oral, Q AM  senna-docusate sodium, 2 tablet, Oral, BID  sodium chloride, 10 mL, Intravenous, Q12H      Continuous Infusions:     Assessment & Plan       Intractable low back pain      Assessment: Patient is a 68-year-old male being followed for subacute stable L2 compression deformity. Based on patient reported history, review of imaging and and patient's clinical presentation  is probably a couple weeks old. He was asymptomatic for any point tenderness.   In review of imaging, I do not feel that there is any posterior element involvement and that the fracture is stable.  He does have some chronic degenerative changes but canal is not severely narrowed patient has no radicular complaints.  His pain seems to be better and he did receive his TLSO brace.    Neurosurgery recommending continued bracing and medical management at this time.  I do feel patient has some significant deconditioning that has increased his chronic degenerative changes and recommend intensive outpatient physical therapy for strengthening.  Neurosurgery recommending dexamethasone taper pack upon discharge and continued pain management with pain medication and muscle relaxers.   Patient is recommended to follow-up in outpatient clinic in 6 weeks with surveillance imaging to progressive deformity with the L2 fracture.  Vertebral compression fracture discharge instructions/recommendations have been placed.  Please call for any questions or concerns        Plan:      L2 compression deformity    - Okay for discharge per the neurosurgical standpoint  - Continue with TLSO brace anytime out of bed  - Continue pain management per primary with recommendations to include  muscle relaxers on discharge  - Dexamethasone taper pack upon discharge (have been ordered)  - Recommend intensive outpatient physical therapy  - Follow-up outpatient clinic in 6 weeks with surveillance imaging.  Please call neurosurgery with any questions or concerns.    I discussed my findings with him, nursing and Dr. Bedoya.        Елена Pierce, APRN  12/04/23  10:31 EST

## 2023-12-04 NOTE — DISCHARGE INSTRUCTIONS
I informed patient that typically vertebral compression fractures heal in approximately 12 weeks.  Patients with osteopenia/osteoporosis may take somewhat longer due to underlying bone density issues.  Pain typically subsides on the average of 4 to 6 weeks with a range of 2 to 12 weeks.  Stable vertebral compression fractures are treated initially with analgesics, muscle relaxers and restricting activity with progressive mobilization.  It is recommended that patient limit lifting, pushing, pulling of no more than 5 pounds, with no no bending or twisting.  No exertional impact activity-walking is okay.  Physical therapy will add value to patient's recovery.  And strongly encouraged.  A TLSO brace has been ordered due to diagnosis of compression fracture of L2.  The purpose of the brace is to support weak muscles, stabilize and restrict movement of the trunk to aid in healing and pain relief of fracture.  Wear brace when out of bed and when riding in a car. I am recommending serial imaging in approximately 6 weeks to rule out progressive deformity.

## 2023-12-04 NOTE — PLAN OF CARE
Goal Outcome Evaluation:              Outcome Evaluation: Patient slept well during the night.  Received a back brace to wear when out of bed.  The neuro surgeon is going to have one of his partners come evlauate him today.  Pt needing a SNF.  Stable at this time.

## 2023-12-04 NOTE — PLAN OF CARE
Goal Outcome Evaluation:   Patient awaiting placement to SNF. Patient has had no complaints during shift. Patient resting in bed.

## 2023-12-05 LAB
ALBUMIN SERPL-MCNC: 3.1 G/DL (ref 3.5–5.2)
ALBUMIN/GLOB SERPL: 1 G/DL
ALP SERPL-CCNC: 105 U/L (ref 39–117)
ALT SERPL W P-5'-P-CCNC: 9 U/L (ref 1–41)
ANION GAP SERPL CALCULATED.3IONS-SCNC: 9 MMOL/L (ref 5–15)
AST SERPL-CCNC: 15 U/L (ref 1–40)
BASOPHILS # BLD AUTO: 0 10*3/MM3 (ref 0–0.2)
BASOPHILS NFR BLD AUTO: 0.3 % (ref 0–1.5)
BILIRUB SERPL-MCNC: 0.2 MG/DL (ref 0–1.2)
BUN SERPL-MCNC: 20 MG/DL (ref 8–23)
BUN/CREAT SERPL: 28.2 (ref 7–25)
CALCIUM SPEC-SCNC: 8.8 MG/DL (ref 8.6–10.5)
CHLORIDE SERPL-SCNC: 98 MMOL/L (ref 98–107)
CO2 SERPL-SCNC: 26 MMOL/L (ref 22–29)
CREAT SERPL-MCNC: 0.71 MG/DL (ref 0.76–1.27)
DEPRECATED RDW RBC AUTO: 44.2 FL (ref 37–54)
EGFRCR SERPLBLD CKD-EPI 2021: 100.6 ML/MIN/1.73
EOSINOPHIL # BLD AUTO: 0 10*3/MM3 (ref 0–0.4)
EOSINOPHIL NFR BLD AUTO: 0 % (ref 0.3–6.2)
ERYTHROCYTE [DISTWIDTH] IN BLOOD BY AUTOMATED COUNT: 15.4 % (ref 12.3–15.4)
GLOBULIN UR ELPH-MCNC: 3.1 GM/DL
GLUCOSE SERPL-MCNC: 135 MG/DL (ref 65–99)
HCT VFR BLD AUTO: 33.6 % (ref 37.5–51)
HGB BLD-MCNC: 11.1 G/DL (ref 13–17.7)
LYMPHOCYTES # BLD AUTO: 1.3 10*3/MM3 (ref 0.7–3.1)
LYMPHOCYTES NFR BLD AUTO: 9.8 % (ref 19.6–45.3)
MCH RBC QN AUTO: 25.8 PG (ref 26.6–33)
MCHC RBC AUTO-ENTMCNC: 33.2 G/DL (ref 31.5–35.7)
MCV RBC AUTO: 77.9 FL (ref 79–97)
MONOCYTES # BLD AUTO: 0.8 10*3/MM3 (ref 0.1–0.9)
MONOCYTES NFR BLD AUTO: 5.6 % (ref 5–12)
NEUTROPHILS NFR BLD AUTO: 11.3 10*3/MM3 (ref 1.7–7)
NEUTROPHILS NFR BLD AUTO: 84.3 % (ref 42.7–76)
NRBC BLD AUTO-RTO: 0 /100 WBC (ref 0–0.2)
PLATELET # BLD AUTO: 515 10*3/MM3 (ref 140–450)
PMV BLD AUTO: 6.7 FL (ref 6–12)
POTASSIUM SERPL-SCNC: 4.4 MMOL/L (ref 3.5–5.2)
PROT SERPL-MCNC: 6.2 G/DL (ref 6–8.5)
RBC # BLD AUTO: 4.32 10*6/MM3 (ref 4.14–5.8)
SODIUM SERPL-SCNC: 133 MMOL/L (ref 136–145)
WBC NRBC COR # BLD AUTO: 13.4 10*3/MM3 (ref 3.4–10.8)

## 2023-12-05 PROCEDURE — 97530 THERAPEUTIC ACTIVITIES: CPT

## 2023-12-05 PROCEDURE — 97760 ORTHOTIC MGMT&TRAING 1ST ENC: CPT

## 2023-12-05 PROCEDURE — 85025 COMPLETE CBC W/AUTO DIFF WBC: CPT | Performed by: FAMILY MEDICINE

## 2023-12-05 PROCEDURE — 25010000002 ENOXAPARIN PER 10 MG: Performed by: FAMILY MEDICINE

## 2023-12-05 PROCEDURE — 80053 COMPREHEN METABOLIC PANEL: CPT | Performed by: FAMILY MEDICINE

## 2023-12-05 PROCEDURE — 97116 GAIT TRAINING THERAPY: CPT

## 2023-12-05 PROCEDURE — 97535 SELF CARE MNGMENT TRAINING: CPT

## 2023-12-05 PROCEDURE — 25010000002 METHYLPREDNISOLONE PER 40 MG: Performed by: FAMILY MEDICINE

## 2023-12-05 RX ADMIN — LEVOTHYROXINE SODIUM 50 MCG: 50 TABLET ORAL at 05:58

## 2023-12-05 RX ADMIN — METHYLPREDNISOLONE SODIUM SUCCINATE 40 MG: 40 INJECTION, POWDER, FOR SOLUTION INTRAMUSCULAR; INTRAVENOUS at 00:52

## 2023-12-05 RX ADMIN — DULOXETINE HYDROCHLORIDE 60 MG: 30 CAPSULE, DELAYED RELEASE ORAL at 08:53

## 2023-12-05 RX ADMIN — CARBAMAZEPINE 200 MG: 200 TABLET ORAL at 20:20

## 2023-12-05 RX ADMIN — HYDROCODONE BITARTRATE AND ACETAMINOPHEN 1 TABLET: 7.5; 325 TABLET ORAL at 23:41

## 2023-12-05 RX ADMIN — CARBAMAZEPINE 200 MG: 200 TABLET ORAL at 09:17

## 2023-12-05 RX ADMIN — Medication 10 ML: at 20:20

## 2023-12-05 RX ADMIN — Medication 10 ML: at 08:53

## 2023-12-05 RX ADMIN — ENOXAPARIN SODIUM 40 MG: 100 INJECTION SUBCUTANEOUS at 16:22

## 2023-12-05 RX ADMIN — HYDRALAZINE HYDROCHLORIDE 50 MG: 25 TABLET, FILM COATED ORAL at 08:53

## 2023-12-05 RX ADMIN — HYDRALAZINE HYDROCHLORIDE 50 MG: 25 TABLET, FILM COATED ORAL at 16:22

## 2023-12-05 RX ADMIN — FAMOTIDINE 40 MG: 20 TABLET ORAL at 08:53

## 2023-12-05 RX ADMIN — METHYLPREDNISOLONE SODIUM SUCCINATE 40 MG: 40 INJECTION, POWDER, FOR SOLUTION INTRAMUSCULAR; INTRAVENOUS at 05:58

## 2023-12-05 RX ADMIN — DOCUSATE SODIUM 50 MG AND SENNOSIDES 8.6 MG 2 TABLET: 8.6; 5 TABLET, FILM COATED ORAL at 08:52

## 2023-12-05 RX ADMIN — METHYLPREDNISOLONE SODIUM SUCCINATE 40 MG: 40 INJECTION, POWDER, FOR SOLUTION INTRAMUSCULAR; INTRAVENOUS at 20:20

## 2023-12-05 RX ADMIN — DESMOPRESSIN ACETATE 400 MCG: 0.2 TABLET ORAL at 20:20

## 2023-12-05 RX ADMIN — HYDRALAZINE HYDROCHLORIDE 50 MG: 25 TABLET, FILM COATED ORAL at 20:20

## 2023-12-05 RX ADMIN — METHYLPREDNISOLONE SODIUM SUCCINATE 40 MG: 40 INJECTION, POWDER, FOR SOLUTION INTRAMUSCULAR; INTRAVENOUS at 14:15

## 2023-12-05 NOTE — THERAPY TREATMENT NOTE
"Subjective: Pt agreeable to therapeutic plan of care.  Cognition: arousal/alertness: Alert and Attentive, AxO x4    Objective:     Bed Mobility: CGA, Pt requires frequent verbal cues to reinforce log roll technique, pt utilized bedside rails  Functional Transfers: Min-A, pt required verbal cues to keep FWW close to body.  Balance: sitting EOB and unsupported Supervision  Functional Ambulation: CGA and with rolling walker    Grooming: SBA and with rolling walker  ADL Position: supported standing and at sink  ADL Comments: Pt utilized FWW to stand at sink to brush hair and teeth. Pt required set-up for tooth paste. Pt observed to keep LUE on walker for stabilization this date.     Vitals: WNL    Pain: 0 VAS  Location: N/a  Interventions for pain: N/A  Education: Provided education on the importance of mobility in the acute care setting      Assessment: Marek Wu presents with ADL impairments affecting function including balance, coordination, endurance / activity tolerance, postural / trunk control, range of motion (ROM), and strength. Pt engaged in therapy session well this date. Pt CGA with bed mobility and min assist for all functional tranfers. Pt completed ADLs standing supported at sink and tolerated ambulating household distances with CGA and FWW in preparation for return home when medically appropriate. Demonstrated functioning below baseline abilities indicate the need for continued skilled intervention while inpatient. Tolerating session today without incident. Will continue to follow and progress as tolerated.     Plan/Recommendations:   Moderate Intensity Therapy recommended post-acute care. This is recommended as therapy feels the patient would require 3-4 days per week and wouldn't tolerate \"3 hour daily\" rehab intensity. SNF would be the preferred choice. If the patient does not agree to SNF, arrange HH or OP depending on home bound status. If patient is medically complex, consider " LTACH..    Pt desires Skilled Rehab placement at discharge. Pt cooperative; agreeable to therapeutic recommendations and plan of care.     Modified [place]: N/A = No pre-op stroke/TIA    Post-Tx Position: Supine with HOB Elevated, Alarms activated, and Call light and personal items within reach  PPE: gloves

## 2023-12-05 NOTE — PLAN OF CARE
"Assessment: Marek Wu presents with ADL impairments affecting function including balance, coordination, endurance / activity tolerance, postural / trunk control, range of motion (ROM), and strength. Pt engaged in therapy session well this date. Pt CGA with bed mobility and min assist for all functional tranfers. Pt completed ADLs standing supported at sink and tolerated ambulating household distances with CGA and FWW in preparation for return home when medically appropriate. Demonstrated functioning below baseline abilities indicate the need for continued skilled intervention while inpatient. Tolerating session today without incident. Will continue to follow and progress as tolerated.      Plan/Recommendations:   Moderate Intensity Therapy recommended post-acute care. This is recommended as therapy feels the patient would require 3-4 days per week and wouldn't tolerate \"3 hour daily\" rehab intensity. SNF would be the preferred choice. If the patient does not agree to SNF, arrange HH or OP depending on home bound status. If patient is medically complex, consider LTACH.  "

## 2023-12-05 NOTE — PLAN OF CARE
Goal Outcome Evaluation:              Outcome Evaluation: Patient slept well during the night.  Up with brace during the night with a 2 assist.  Pt waiting on placement.  Stable at this time.

## 2023-12-05 NOTE — PROGRESS NOTES
LOS: 1 day   Patient Care Team:  Jeff Jacques MD as PCP - General (Urology)        Subjective  - Lying on bed    Interval History:  None    Patient Complaints: c/o pain with activity    Review of Systems   Constitutional:  Positive for activity change and fatigue.   Eyes: Negative.    Respiratory: Negative.     Cardiovascular: Negative.    Gastrointestinal: Negative.    Endocrine: Negative.    Genitourinary:  Positive for frequency.   Musculoskeletal:  Positive for back pain and gait problem.   Neurological:  Positive for weakness.   Psychiatric/Behavioral:  Positive for sleep disturbance. The patient is nervous/anxious.         Objective     Vital Signs  Temp:  [98 °F (36.7 °C)-98.8 °F (37.1 °C)] 98.7 °F (37.1 °C)  Heart Rate:  [73-84] 84  Resp:  [16-18] 16  BP: (127-156)/(61-78) 127/61    Physical Exam:     General Appearance:    Alert, cooperative, in no acute distress   Ears:    Ears appear intact with no abnormalities noted   Throat:   No oral lesions, no thrush, oral mucosa moist   Neck:   No adenopathy, supple, trachea midline, no thyromegaly, no   carotid bruit, no JVD   Lungs:     Clear to auscultation, respirations regular, even and               Unlabored     Heart:    Regular rhythm and normal rate, normal S1 and S2, no          murmur, no gallop, no rub, no click   Abdomen:     Normal bowel sounds, no masses, no organomegaly, soft      nontender, nondistended, no guarding, no rebound                tenderness   Extremities:   Moves all extremities well, no edema, no cyanosis, no           redness   Skin:   No bleeding, bruising or rash   Neurologic:   Cranial nerves 2 - 12 grossly intact, sensation intact, DTR       present and equal bilaterally        Results Review:    Lab Results (last 24 hours)       Procedure Component Value Units Date/Time    Comprehensive Metabolic Panel [[id number]]  (Abnormal) Collected: 12/04/23 0135    Specimen: Blood Updated: 12/04/23 0222     Glucose 139 mg/dL       BUN 13 mg/dL      Creatinine 0.91 mg/dL      Sodium 132 mmol/L      Potassium 4.2 mmol/L      Chloride 97 mmol/L      CO2 26.0 mmol/L      Calcium 9.2 mg/dL      Total Protein 6.8 g/dL      Albumin 3.3 g/dL      ALT (SGPT) 11 U/L      AST (SGOT) 14 U/L      Alkaline Phosphatase 120 U/L      Total Bilirubin 0.2 mg/dL      Globulin 3.5 gm/dL      A/G Ratio 0.9 g/dL      BUN/Creatinine Ratio 14.3     Anion Gap 9.0 mmol/L      eGFR 92.4 mL/min/1.73     Narrative:      GFR Normal >60  Chronic Kidney Disease <60  Kidney Failure <15      CBC & Differential [[id number]]  (Abnormal) Collected: 12/04/23 0135    Specimen: Blood Updated: 12/04/23 0202    Narrative:      The following orders were created for panel order CBC & Differential.  Procedure                               Abnormality         Status                     ---------                               -----------         ------                     CBC Auto Differential[[id number]]        Abnormal            Final result                 Please view results for these tests on the individual orders.    CBC Auto Differential [[id number]]  (Abnormal) Collected: 12/04/23 0135    Specimen: Blood Updated: 12/04/23 0202     WBC 7.10 10*3/mm3      RBC 4.65 10*6/mm3      Hemoglobin 12.5 g/dL      Hematocrit 37.1 %      MCV 79.7 fL      MCH 26.9 pg      MCHC 33.7 g/dL      RDW 15.5 %      RDW-SD 42.0 fl      MPV 6.6 fL      Platelets 594 10*3/mm3      Neutrophil % 84.5 %      Lymphocyte % 13.2 %      Monocyte % 2.0 %      Eosinophil % 0.1 %      Basophil % 0.2 %      Neutrophils, Absolute 6.00 10*3/mm3      Lymphocytes, Absolute 0.90 10*3/mm3      Monocytes, Absolute 0.10 10*3/mm3      Eosinophils, Absolute 0.00 10*3/mm3      Basophils, Absolute 0.00 10*3/mm3      nRBC 0.0 /100 WBC            Imaging Results (Last 24 Hours)       ** No results found for the last 24 hours. **             I reviewed the patient's other test results and agree with the interpretation    Medication  Review:     Current Facility-Administered Medications:     aluminum-magnesium hydroxide-simethicone (MAALOX MAX) 400-400-40 MG/5ML suspension 15 mL, 15 mL, Oral, Q6H PRN, Danuta Dillon MD    sennosides-docusate (PERICOLACE) 8.6-50 MG per tablet 2 tablet, 2 tablet, Oral, BID, 2 tablet at 12/04/23 0808 **AND** polyethylene glycol (MIRALAX) packet 17 g, 17 g, Oral, Daily PRN **AND** bisacodyl (DULCOLAX) EC tablet 5 mg, 5 mg, Oral, Daily PRN **AND** bisacodyl (DULCOLAX) suppository 10 mg, 10 mg, Rectal, Daily PRN, Danuta Dillon MD    Calcium Replacement - Follow Nurse / BPA Driven Protocol, , Does not apply, PRN, Danuta Dillon MD    carBAMazepine (TEGretol) tablet 200 mg, 200 mg, Oral, BID, Danuta Dillon MD, 200 mg at 12/04/23 2002    desmopressin (DDAVP) tablet 400 mcg, 400 mcg, Oral, Nightly, Danuta Dillon MD, 400 mcg at 12/04/23 2002    DULoxetine (CYMBALTA) DR capsule 60 mg, 60 mg, Oral, Daily, Danuta Dillon MD, 60 mg at 12/04/23 0808    Enoxaparin Sodium (LOVENOX) syringe 40 mg, 40 mg, Subcutaneous, Q24H, Danuta Dillon MD, 40 mg at 12/04/23 1807    famotidine (PEPCID) tablet 40 mg, 40 mg, Oral, Daily, Danuta Dillon MD, 40 mg at 12/04/23 0808    hydrALAZINE (APRESOLINE) tablet 50 mg, 50 mg, Oral, TID, Danuta Dillon MD, 50 mg at 12/04/23 2002    HYDROcodone-acetaminophen (NORCO) 7.5-325 MG per tablet 1 tablet, 1 tablet, Oral, Q6H PRN, Danuta Dillon MD, 1 tablet at 12/03/23 1641    Influenza Vac High-Dose Quad (FLUZONE HIGH DOSE) injection 0.7 mL, 0.7 mL, Intramuscular, During Hospitalization, Danuta Dillon MD    levothyroxine (SYNTHROID, LEVOTHROID) tablet 50 mcg, 50 mcg, Oral, Q AM, Danuta Dillon MD, 50 mcg at 12/04/23 0552    Magnesium Standard Dose Replacement - Follow Nurse / BPA Driven Protocol, , Does not apply, PRN, Danuta Dillon MD    methylPREDNISolone sodium succinate  (SOLU-Medrol) injection 40 mg, 40 mg, Intravenous, Q6H, Danuta Dillon MD, 40 mg at 12/04/23 2000    ondansetron (ZOFRAN) injection 4 mg, 4 mg, Intravenous, Q6H PRNWilma Dhamayanthi, MD    Phosphorus Replacement - Follow Nurse / BPA Driven Protocol, , Does not apply, Wilma VELEZ Dhamayanthi, MD    Potassium Replacement - Follow Nurse / BPA Driven Protocol, , Does not apply, Wilma VELEZ Dhamayanthi, MD    sodium chloride 0.9 % flush 10 mL, 10 mL, Intravenous, Q12H, Danuta Dillon MD, 10 mL at 12/04/23 2002    sodium chloride 0.9 % flush 10 mL, 10 mL, Intravenous, PRN, Danuta Dillon MD    sodium chloride 0.9 % infusion 40 mL, 40 mL, Intravenous, PRWilma ROSEN Dhamayanthi, MD    I have reviewed medication list.    Assessment & Plan     Principal Problem:   Intractable low back pain  - Pain control    BLE weakness - spine surgery consulted    Abnormal MRI of L-S Spine - spine surgery consult    L2 compression Fx -  Spine surgery consulted, on IV Steroid, re-evaluation today     Hypothyroid  - Elevated TSH, started pt on synthroid    COPD  - Stable    Major recurrent depression with psychotic behavioral - continue home medicine    Unsteady Gait with multiple Falls - PT  recommended in patient rehab    Stress ulcer/DVT prophylaxis            Plan for disposition: may need rehab at discharge     Danuta Dillon MD  12/04/23  20:59 EST

## 2023-12-05 NOTE — CASE MANAGEMENT/SOCIAL WORK
Continued Stay Note  RODOLFO Domingo     Patient Name: Marek Wu  MRN: [id number]  Today's Date: 12/5/2023    Admit Date: 12/1/2023    Plan: DC PLAN: [facility] Accepted. PASRR approved. Bed available tomorrow 12/6.       Discharge Plan       Row Name 12/05/23 1214       Plan    Plan DC PLAN: [facility] Accepted. PASRR approved. Bed available tomorrow 12/6.    Patient/Family in Agreement with Plan yes    Plan Comments Recieved message from Kassie who says accept patient and bed available tomorrow 12/6. Needs 3 midnights.                      Expected Discharge Date and Time       Expected Discharge Date Expected Discharge Time    Dec 6, 2023             Estela Felder RN

## 2023-12-05 NOTE — THERAPY TREATMENT NOTE
Subjective: Pt agreeable to therapeutic plan of care.    Objective:     Bed mobility - Min-A supine to sit.  Slow to complete task and cues needed for log rolling.  Sit to supine needed cga and cues for log rolling  Transfers - Min-A and with rolling walker sit to stand from edge of the bed 2 times before gait training.  Adjusted pt's TLSO for better fit and instructed pt on donning/doffing back brace.   Ambulation - 125 feet CGA and with rolling walker very short step length, heavy use of upper extremities on the roll walker.     Vitals: WNL    Pain: 3 VAS   Location: back  Intervention for pain: Repositioned, RN provided medication, Increased Activity, and Therapeutic Presence    Education: Transfer Training and Gait Training    Assessment: Marek Wu presents with functional mobility impairments which indicate the need for skilled intervention. Tolerating session today without incident.  Pt is progressing fairly well.  Fit pt with TLSO that was in the room. Pt making progress with his tranfers and gait.  Will continue to follow and progress as tolerated.     Plan/Recommendations:   Moderate Intensity Therapy recommended post-acute care. SNF would be the preferred choice. Pt requires no DME at discharge.     Pt desires Skilled Rehab placement at discharge. Pt cooperative; agreeable to therapeutic recommendations and plan of care.     Basic Mobility 6-click:  Rollin = Total, A lot = 2, A little = 3; 4 = None  Supine>Sit:   1 = Total, A lot = 2, A little = 3; 4 = None   Sit>Stand with arms:  1 = Total, A lot = 2, A little = 3; 4 = None  Bed>Chair:   1 = Total, A lot = 2, A little = 3; 4 = None  Ambulate in room:  1 = Total, A lot = 2, A little = 3; 4 = None  3-5 Steps with railin = Total, A lot = 2, A little = 3; 4 = None  Score: 17    Post-Tx Position: Supine with HOB Elevated, Alarms activated, and Call light and personal items within reach  pt in a double room and no room for a bedside  chair so returned pt to bed.   PPE: gloves

## 2023-12-05 NOTE — PLAN OF CARE
Goal Outcome Evaluation:     Bed mobility - Min-A supine to sit.  Slow to complete task and cues needed for log rolling.  Sit to supine needed cga and cues for log rolling  Transfers - Min-A and with rolling walker sit to stand from edge of the bed 2 times before gait training.  Adjusted pt's TLSO for better fit and instructed pt on donning/doffing back brace.   Ambulation - 125 feet CGA and with rolling walker very short step length, heavy use of upper extremities on the roll walker.      Moderate Intensity Therapy recommended post-acute care. SNF would be the preferred choice. Pt requires no DME at discharge.     Pt desires Skilled Rehab placement at discharge. Pt cooperative; agreeable to therapeutic recommendations and plan of care.

## 2023-12-06 VITALS
OXYGEN SATURATION: 96 % | HEART RATE: 66 BPM | TEMPERATURE: 98 F | BODY MASS INDEX: 31.65 KG/M2 | DIASTOLIC BLOOD PRESSURE: 75 MMHG | WEIGHT: 268.08 LBS | HEIGHT: 77 IN | RESPIRATION RATE: 18 BRPM | SYSTOLIC BLOOD PRESSURE: 157 MMHG

## 2023-12-06 PROBLEM — M54.59 INTRACTABLE LOW BACK PAIN: Status: RESOLVED | Noted: 2023-12-01 | Resolved: 2023-12-06

## 2023-12-06 LAB
ALBUMIN SERPL-MCNC: 3.2 G/DL (ref 3.5–5.2)
ALBUMIN/GLOB SERPL: 1.1 G/DL
ALP SERPL-CCNC: 103 U/L (ref 39–117)
ALT SERPL W P-5'-P-CCNC: 26 U/L (ref 1–41)
ANION GAP SERPL CALCULATED.3IONS-SCNC: 8 MMOL/L (ref 5–15)
AST SERPL-CCNC: 30 U/L (ref 1–40)
BASOPHILS # BLD AUTO: 0 10*3/MM3 (ref 0–0.2)
BASOPHILS NFR BLD AUTO: 0.3 % (ref 0–1.5)
BILIRUB SERPL-MCNC: 0.2 MG/DL (ref 0–1.2)
BUN SERPL-MCNC: 21 MG/DL (ref 8–23)
BUN/CREAT SERPL: 26.6 (ref 7–25)
CALCIUM SPEC-SCNC: 8.5 MG/DL (ref 8.6–10.5)
CHLORIDE SERPL-SCNC: 99 MMOL/L (ref 98–107)
CO2 SERPL-SCNC: 25 MMOL/L (ref 22–29)
CREAT SERPL-MCNC: 0.79 MG/DL (ref 0.76–1.27)
DEPRECATED RDW RBC AUTO: 45.5 FL (ref 37–54)
EGFRCR SERPLBLD CKD-EPI 2021: 97.4 ML/MIN/1.73
EOSINOPHIL # BLD AUTO: 0 10*3/MM3 (ref 0–0.4)
EOSINOPHIL NFR BLD AUTO: 0 % (ref 0.3–6.2)
ERYTHROCYTE [DISTWIDTH] IN BLOOD BY AUTOMATED COUNT: 15.8 % (ref 12.3–15.4)
GLOBULIN UR ELPH-MCNC: 2.9 GM/DL
GLUCOSE BLDC GLUCOMTR-MCNC: 149 MG/DL (ref 70–105)
GLUCOSE SERPL-MCNC: 110 MG/DL (ref 65–99)
HCT VFR BLD AUTO: 32 % (ref 37.5–51)
HGB BLD-MCNC: 10.8 G/DL (ref 13–17.7)
LYMPHOCYTES # BLD AUTO: 1.3 10*3/MM3 (ref 0.7–3.1)
LYMPHOCYTES NFR BLD AUTO: 12.4 % (ref 19.6–45.3)
MCH RBC QN AUTO: 26.4 PG (ref 26.6–33)
MCHC RBC AUTO-ENTMCNC: 33.8 G/DL (ref 31.5–35.7)
MCV RBC AUTO: 78.1 FL (ref 79–97)
MONOCYTES # BLD AUTO: 0.5 10*3/MM3 (ref 0.1–0.9)
MONOCYTES NFR BLD AUTO: 4.8 % (ref 5–12)
NEUTROPHILS NFR BLD AUTO: 8.7 10*3/MM3 (ref 1.7–7)
NEUTROPHILS NFR BLD AUTO: 82.5 % (ref 42.7–76)
NRBC BLD AUTO-RTO: 0.1 /100 WBC (ref 0–0.2)
PLATELET # BLD AUTO: 476 10*3/MM3 (ref 140–450)
PMV BLD AUTO: 6.8 FL (ref 6–12)
POTASSIUM SERPL-SCNC: 4.7 MMOL/L (ref 3.5–5.2)
PROT SERPL-MCNC: 6.1 G/DL (ref 6–8.5)
RBC # BLD AUTO: 4.09 10*6/MM3 (ref 4.14–5.8)
SODIUM SERPL-SCNC: 132 MMOL/L (ref 136–145)
WBC NRBC COR # BLD AUTO: 10.5 10*3/MM3 (ref 3.4–10.8)

## 2023-12-06 PROCEDURE — 25010000002 METHYLPREDNISOLONE PER 40 MG: Performed by: FAMILY MEDICINE

## 2023-12-06 PROCEDURE — 80053 COMPREHEN METABOLIC PANEL: CPT | Performed by: FAMILY MEDICINE

## 2023-12-06 PROCEDURE — 82948 REAGENT STRIP/BLOOD GLUCOSE: CPT

## 2023-12-06 PROCEDURE — 85025 COMPLETE CBC W/AUTO DIFF WBC: CPT | Performed by: FAMILY MEDICINE

## 2023-12-06 RX ORDER — FAMOTIDINE 40 MG/1
40 TABLET, FILM COATED ORAL DAILY
Qty: 30 TABLET | Refills: 0 | Status: SHIPPED | OUTPATIENT
Start: 2023-12-06 | End: 2024-01-05

## 2023-12-06 RX ADMIN — CARBAMAZEPINE 200 MG: 200 TABLET ORAL at 09:00

## 2023-12-06 RX ADMIN — METHYLPREDNISOLONE SODIUM SUCCINATE 40 MG: 40 INJECTION, POWDER, FOR SOLUTION INTRAMUSCULAR; INTRAVENOUS at 02:27

## 2023-12-06 RX ADMIN — DOCUSATE SODIUM 50 MG AND SENNOSIDES 8.6 MG 2 TABLET: 8.6; 5 TABLET, FILM COATED ORAL at 09:00

## 2023-12-06 RX ADMIN — FAMOTIDINE 40 MG: 20 TABLET ORAL at 09:00

## 2023-12-06 RX ADMIN — HYDRALAZINE HYDROCHLORIDE 50 MG: 25 TABLET, FILM COATED ORAL at 09:00

## 2023-12-06 RX ADMIN — LEVOTHYROXINE SODIUM 50 MCG: 50 TABLET ORAL at 05:23

## 2023-12-06 RX ADMIN — Medication 10 ML: at 09:01

## 2023-12-06 RX ADMIN — METHYLPREDNISOLONE SODIUM SUCCINATE 40 MG: 40 INJECTION, POWDER, FOR SOLUTION INTRAMUSCULAR; INTRAVENOUS at 05:23

## 2023-12-06 RX ADMIN — DULOXETINE HYDROCHLORIDE 60 MG: 30 CAPSULE, DELAYED RELEASE ORAL at 09:01

## 2023-12-06 NOTE — PROGRESS NOTES
LOS: 2 days   Patient Care Team:  Jeff Jacques MD as PCP - General (Urology)        Subjective  - Lying on bed    Interval History:  None    Patient Complaints: c/o pain with activity    Review of Systems   Constitutional:  Positive for activity change and fatigue.   Eyes: Negative.    Respiratory: Negative.     Cardiovascular: Negative.    Gastrointestinal: Negative.    Endocrine: Negative.    Genitourinary:  Positive for frequency.   Musculoskeletal:  Positive for back pain and gait problem.   Neurological:  Positive for weakness.   Psychiatric/Behavioral:  Positive for sleep disturbance. The patient is nervous/anxious.         Objective     Vital Signs  Temp:  [97.6 °F (36.4 °C)-98.7 °F (37.1 °C)] 97.6 °F (36.4 °C)  Heart Rate:  [63-84] 63  Resp:  [16-20] 18  BP: (127-180)/(61-87) 139/69    Physical Exam:     General Appearance:    Alert, cooperative, in no acute distress   Ears:    Ears appear intact with no abnormalities noted   Throat:   No oral lesions, no thrush, oral mucosa moist   Neck:   No adenopathy, supple, trachea midline, no thyromegaly, no   carotid bruit, no JVD   Lungs:     Clear to auscultation, respirations regular, even and               Unlabored     Heart:    Regular rhythm and normal rate, normal S1 and S2, no          murmur, no gallop, no rub, no click   Abdomen:     Normal bowel sounds, no masses, no organomegaly, soft      nontender, nondistended, no guarding, no rebound                tenderness   Extremities:   Moves all extremities well, no edema, no cyanosis, no           redness   Skin:   No bleeding, bruising or rash   Neurologic:   Cranial nerves 2 - 12 grossly intact, sensation intact, DTR       present and equal bilaterally        Results Review:    Lab Results (last 24 hours)       Procedure Component Value Units Date/Time    Comprehensive Metabolic Panel [[id number]]  (Abnormal) Collected: 12/05/23 0036    Specimen: Blood Updated: 12/05/23 0219     Glucose 135 mg/dL       BUN 20 mg/dL      Creatinine 0.71 mg/dL      Sodium 133 mmol/L      Potassium 4.4 mmol/L      Chloride 98 mmol/L      CO2 26.0 mmol/L      Calcium 8.8 mg/dL      Total Protein 6.2 g/dL      Albumin 3.1 g/dL      ALT (SGPT) 9 U/L      AST (SGOT) 15 U/L      Alkaline Phosphatase 105 U/L      Total Bilirubin 0.2 mg/dL      Globulin 3.1 gm/dL      A/G Ratio 1.0 g/dL      BUN/Creatinine Ratio 28.2     Anion Gap 9.0 mmol/L      eGFR 100.6 mL/min/1.73     Narrative:      GFR Normal >60  Chronic Kidney Disease <60  Kidney Failure <15      CBC & Differential [[id number]]  (Abnormal) Collected: 12/05/23 0036    Specimen: Blood Updated: 12/05/23 0125    Narrative:      The following orders were created for panel order CBC & Differential.  Procedure                               Abnormality         Status                     ---------                               -----------         ------                     CBC Auto Differential[[id number]]        Abnormal            Final result                 Please view results for these tests on the individual orders.    CBC Auto Differential [[id number]]  (Abnormal) Collected: 12/05/23 0036    Specimen: Blood Updated: 12/05/23 0125     WBC 13.40 10*3/mm3      RBC 4.32 10*6/mm3      Hemoglobin 11.1 g/dL      Hematocrit 33.6 %      MCV 77.9 fL      MCH 25.8 pg      MCHC 33.2 g/dL      RDW 15.4 %      RDW-SD 44.2 fl      MPV 6.7 fL      Platelets 515 10*3/mm3      Neutrophil % 84.3 %      Lymphocyte % 9.8 %      Monocyte % 5.6 %      Eosinophil % 0.0 %      Basophil % 0.3 %      Neutrophils, Absolute 11.30 10*3/mm3      Lymphocytes, Absolute 1.30 10*3/mm3      Monocytes, Absolute 0.80 10*3/mm3      Eosinophils, Absolute 0.00 10*3/mm3      Basophils, Absolute 0.00 10*3/mm3      nRBC 0.0 /100 WBC            Imaging Results (Last 24 Hours)       ** No results found for the last 24 hours. **             I reviewed the patient's other test results and agree with the  interpretation    Medication Review:     Current Facility-Administered Medications:     aluminum-magnesium hydroxide-simethicone (MAALOX MAX) 400-400-40 MG/5ML suspension 15 mL, 15 mL, Oral, Q6H PRN, Danuta Dillon MD    sennosides-docusate (PERICOLACE) 8.6-50 MG per tablet 2 tablet, 2 tablet, Oral, BID, 2 tablet at 12/05/23 0852 **AND** polyethylene glycol (MIRALAX) packet 17 g, 17 g, Oral, Daily PRN **AND** bisacodyl (DULCOLAX) EC tablet 5 mg, 5 mg, Oral, Daily PRN **AND** bisacodyl (DULCOLAX) suppository 10 mg, 10 mg, Rectal, Daily PRN, Danuta Dillon MD    Calcium Replacement - Follow Nurse / BPA Driven Protocol, , Does not apply, PRN, Danuta Dillon MD    carBAMazepine (TEGretol) tablet 200 mg, 200 mg, Oral, BID, Danuta Diloln MD, 200 mg at 12/05/23 2020    desmopressin (DDAVP) tablet 400 mcg, 400 mcg, Oral, Nightly, Danuta Dillon MD, 400 mcg at 12/05/23 2020    DULoxetine (CYMBALTA) DR capsule 60 mg, 60 mg, Oral, Daily, Danuta Dillon MD, 60 mg at 12/05/23 0853    Enoxaparin Sodium (LOVENOX) syringe 40 mg, 40 mg, Subcutaneous, Q24H, Danuta Dillon MD, 40 mg at 12/05/23 1622    famotidine (PEPCID) tablet 40 mg, 40 mg, Oral, Daily, Danuta Dillon MD, 40 mg at 12/05/23 0853    hydrALAZINE (APRESOLINE) tablet 50 mg, 50 mg, Oral, TID, Danuta Dillon MD, 50 mg at 12/05/23 2020    HYDROcodone-acetaminophen (NORCO) 7.5-325 MG per tablet 1 tablet, 1 tablet, Oral, Q6H PRN, Landon Dillonthi, MD, 1 tablet at 12/03/23 1641    Influenza Vac High-Dose Quad (FLUZONE HIGH DOSE) injection 0.7 mL, 0.7 mL, Intramuscular, During Hospitalization, Danuta Dillon MD    levothyroxine (SYNTHROID, LEVOTHROID) tablet 50 mcg, 50 mcg, Oral, Q AM, Danuta Dillon MD, 50 mcg at 12/05/23 0558    Magnesium Standard Dose Replacement - Follow Nurse / BPA Driven Protocol, , Does not apply, Wilma VELEZ Dhamayanthi, MD    methylPREDNISolone  sodium succinate (SOLU-Medrol) injection 40 mg, 40 mg, Intravenous, Q6H, Danuta Dillon MD, 40 mg at 12/05/23 2020    ondansetron (ZOFRAN) injection 4 mg, 4 mg, Intravenous, Q6H PRWilma ROSEN Dhamayanthi, MD    Phosphorus Replacement - Follow Nurse / BPA Driven Protocol, , Does not apply, Wilma VELEZ Dhamayanthi, MD    Potassium Replacement - Follow Nurse / BPA Driven Protocol, , Does not apply, Wilma VELEZ Dhamayanthi, MD    sodium chloride 0.9 % flush 10 mL, 10 mL, Intravenous, Q12H, Danuta Dillon MD, 10 mL at 12/05/23 2020    sodium chloride 0.9 % flush 10 mL, 10 mL, Intravenous, PRN, Danuta Dillon MD    sodium chloride 0.9 % infusion 40 mL, 40 mL, Intravenous, PRWilma ROSEN Dhamayanthi, MD    I have reviewed medication list.    Assessment & Plan     Principal Problem:   Intractable low back pain  - Pain control    BLE weakness - spine surgery consulted    Abnormal MRI of L-S Spine - spine surgery consult    L2 compression Fx -  Spine surgery consulted, on IV Steroid, will discharge on oral steroid and Brace     Hypothyroid  - Elevated TSH, started pt on synthroid    COPD  - Stable    Major recurrent depression with psychotic behavioral - continue home medicine    Unsteady Gait with multiple Falls - PT  recommended in patient rehab    Stress ulcer/DVT prophylaxis            Plan for disposition: may need rehab at discharge     Danuta Dillon MD  12/05/23  20:21 EST

## 2023-12-06 NOTE — PLAN OF CARE
Goal Outcome Evaluation:              Outcome Evaluation: Pt continues to await placement. Prn pain medication effective with pain control.  Will continue to monitor.

## 2023-12-06 NOTE — CASE MANAGEMENT/SOCIAL WORK
Continued Stay Note  RODOLFO Domingo     Patient Name: Marek Wu  MRN: [id number]  Today's Date: 12/6/2023    Admit Date: 12/1/2023    Plan: [facility] accepted. No precert required. PASRR approved.   Discharge Plan       Row Name 12/06/23 1109       Plan    Plan [facility] accepted. No precert required. PASRR approved.    Patient/Family in Agreement with Plan yes    Plan Comments Discharge orders noted this morning by Dr Dillon. Confirmed with facility liaison Kassie that bed is ready today. Updated pharmacy to [facility]. Patient's brother to provide transportation.           Megan Naegele, RN     Office Phone: [phone]  Office Cell: [phone]

## 2023-12-06 NOTE — CASE MANAGEMENT/SOCIAL WORK
Case Management Discharge Note      [facility]         Selected Continued Care - Discharged on 12/6/2023 Admission date: 12/1/2023 - Discharge disposition: Skilled Nursing Facility (DC - External)      Destination Coordination complete.      Service Provider Selected Services Address Phone Fax Patient Preferred    [facility] [address] [facility] [place] [place] [address] [phone] --           Transportation Services  Private: Car    Final Discharge Disposition Code: 03 - skilled nursing facility (SNF)

## 2023-12-07 NOTE — DISCHARGE SUMMARY
Date of Discharge:  12/6/2023    Discharge Diagnosis:     Intractable low back pain      BLE weakness     Abnormal MRI of L-S Spine     L2 compression Fx    Hypothyroid      COPD      Major recurrent depression with psychotic behavioral     Unsteady Gait with multiple Falls       Presenting Problem/History of Present Illness  Active Hospital Problems   No active problems to display.      Resolved Hospital Problems    Diagnosis Date Resolved POA    **Intractable low back pain [M54.59] 12/06/2023 Yes          Hospital Course  Patient is a 68 y.o. male presented with intractable LBP, unsteady gait and multiple fall, admitted for further evaluation and treatment spine surgery consulted , Dx with L2 compression Fx, started on IV Steroid, and decided medical management and rehab, it not better in 4 weeks then surgery, and PT recommended SNF for short term rehab and patient and family is agreeable for this, so pt is discharged to SNF.      Procedures Performed         Consults:   Consults       Date and Time Order Name Status Description    12/1/2023  7:30 PM Inpatient Spine Surgery Consult Completed             Pertinent Test Results:  Lab Results (last 24 hours)       Procedure Component Value Units Date/Time    POC Glucose Once [[id number]]  (Abnormal) Collected: 12/06/23 0852    Specimen: Blood Updated: 12/06/23 0853     Glucose 149 mg/dL      Comment: Serial Number: [id number]:  [id number]       Comprehensive Metabolic Panel [[id number]]  (Abnormal) Collected: 12/06/23 0119    Specimen: Blood Updated: 12/06/23 0342     Glucose 110 mg/dL      BUN 21 mg/dL      Creatinine 0.79 mg/dL      Sodium 132 mmol/L      Potassium 4.7 mmol/L      Chloride 99 mmol/L      CO2 25.0 mmol/L      Calcium 8.5 mg/dL      Total Protein 6.1 g/dL      Albumin 3.2 g/dL      ALT (SGPT) 26 U/L      AST (SGOT) 30 U/L      Alkaline Phosphatase 103 U/L      Total Bilirubin 0.2 mg/dL      Globulin 2.9 gm/dL      A/G Ratio 1.1 g/dL       BUN/Creatinine Ratio 26.6     Anion Gap 8.0 mmol/L      eGFR 97.4 mL/min/1.73     Narrative:      GFR Normal >60  Chronic Kidney Disease <60  Kidney Failure <15      CBC & Differential [[id number]]  (Abnormal) Collected: 12/06/23 0119    Specimen: Blood Updated: 12/06/23 0242    Narrative:      The following orders were created for panel order CBC & Differential.  Procedure                               Abnormality         Status                     ---------                               -----------         ------                     CBC Auto Differential[[id number]]        Abnormal            Final result                 Please view results for these tests on the individual orders.    CBC Auto Differential [[id number]]  (Abnormal) Collected: 12/06/23 0119    Specimen: Blood Updated: 12/06/23 0242     WBC 10.50 10*3/mm3      RBC 4.09 10*6/mm3      Hemoglobin 10.8 g/dL      Hematocrit 32.0 %      MCV 78.1 fL      MCH 26.4 pg      MCHC 33.8 g/dL      RDW 15.8 %      RDW-SD 45.5 fl      MPV 6.8 fL      Platelets 476 10*3/mm3      Neutrophil % 82.5 %      Lymphocyte % 12.4 %      Monocyte % 4.8 %      Eosinophil % 0.0 %      Basophil % 0.3 %      Neutrophils, Absolute 8.70 10*3/mm3      Lymphocytes, Absolute 1.30 10*3/mm3      Monocytes, Absolute 0.50 10*3/mm3      Eosinophils, Absolute 0.00 10*3/mm3      Basophils, Absolute 0.00 10*3/mm3      nRBC 0.1 /100 WBC            I have reviewed lab results.    Condition on Discharge:  Improved    Vital Signs  Temp:  [98 °F (36.7 °C)-98.3 °F (36.8 °C)] 98 °F (36.7 °C)  Heart Rate:  [62-66] 66  Resp:  [18] 18  BP: (154-157)/(69-75) 157/75    Physical Exam:     General Appearance:    Alert, cooperative, in no acute distress   Ears:    Ears appear intact with no abnormalities noted   Throat:   No oral lesions, no thrush, oral mucosa moist   Neck:   No adenopathy, supple, trachea midline, no thyromegaly, no   carotid bruit, no JVD   Lungs:     Clear to auscultation,respirations  regular, even and                Unlabored     Heart:    Regular rhythm and normal rate, normal S1 and S2, no          murmur, no gallop, no rub, no click   Abdomen:     Normal bowel sounds, no masses, no organomegaly, soft      non-tender, non-distended, no guarding, no rebound                tenderness   Extremities:   Moves all extremities well, no edema, no cyanosis, no           redness   Skin:   No bleeding, bruising or rash   Neurologic:   Cranial nerves 2 - 12 grossly intact, sensation intact, DTR       present and equal bilaterally       Discharge Disposition  Skilled Nursing Facility (DC - External)    Discharge Medications     Discharge Medications        New Medications        Instructions Start Date   dexAMETHasone 1.5 MG tablet  Commonly known as: DECADRON   1.5 mg, Oral, Take As Directed, Day 1 take 3 am, 3 pm ,Day 2 take 3 am, 2 pm,Day 3 take 3 am, 2 pm,Day 4 take 2 am, 2 pm  ,Day 5 take 2 am, 2 pm,Day 6 take 2 am, 1 pm,Day 7 take 2 am, 1 pm, Day 8 take 1 am, 1 pm,Day 9 take 1 am, 1 pm,Day 10 take 1 am      famotidine 40 MG tablet  Commonly known as: PEPCID   40 mg, Oral, Daily             Continue These Medications        Instructions Start Date   carBAMazepine  MG 12 hr tablet  Commonly known as: TEGretol  XR   200 mg, Oral, 2 Times Daily      desmopressin 0.2 MG tablet  Commonly known as: DDAVP   0.4 mg, Oral, Nightly      diclofenac sodium 100 MG 24 hr tablet  Commonly known as: VOTAREN XR   200 mg, Oral, Daily      DULoxetine 60 MG capsule  Commonly known as: CYMBALTA   60 mg, Oral, Daily      fexofenadine 180 MG tablet  Commonly known as: ALLEGRA   180 mg, Oral, Daily      gabapentin 300 MG capsule  Commonly known as: NEURONTIN   300 mg, Oral, Daily      hydrALAZINE 50 MG tablet  Commonly known as: APRESOLINE   50 mg, Oral, 3 Times Daily      levothyroxine 25 MCG tablet  Commonly known as: SYNTHROID, LEVOTHROID   25 mcg, Oral, Every Early Morning               Discharge Diet:   Diet  Instructions       Diet: Diabetic Diets, Fluid Restriction (240 mL/tray) Diets; Consistent Carbohydrate; Regular Texture (IDDSI 7); Thin (IDDSI 0); 1500 mL/day      Discharge Diet:  Diabetic Diets  Fluid Restriction (240 mL/tray) Diets       Diabetic Diet: Consistent Carbohydrate    Texture: Regular Texture (IDDSI 7)    Fluid Consistency: Thin (IDDSI 0)    Fluid Restriction Diet (240 mL/tray): 1500 mL/day            Activity at Discharge:   Activity Instructions    As tolerated with brace           Follow-up Appointments  Future Appointments   Date Time Provider Department Center   1/17/2024 11:00 AM Елена Pierce APRN [facility]     Additional Instructions for the Follow-ups that You Need to Schedule       Call MD With Problems / Concerns   As directed      Instructions: Called [phone] if fever greater then 101, nausea/vomiting, chest pain, shortness of breath or abdominal pain    Order Comments: Instructions: Called [phone] if fever greater then 101, nausea/vomiting, chest pain, shortness of breath or abdominal pain         Discharge Follow-up with PCP   As directed       Currently Documented PCP:    Jeff Jacques MD    PCP Phone Number:    [phone]     Follow Up Details: Will follow patient at [facility], admitting nurse to call once patient has arrived        XR Spine Lumbar 2 or 3 View   Kamran 15, 2024      Exam reason: Follow-up L2 compression fracture   Release to patient: Routine Release                Test Results Pending at Discharge       Danuta Dillon MD  12/06/23  20:44 EST

## 2023-12-29 ENCOUNTER — HOSPITAL ENCOUNTER (INPATIENT)
Facility: HOSPITAL | Age: 68
LOS: 1 days | Discharge: NURSING, REHAB OR LONG-TERM CARE | End: 2023-12-31
Attending: EMERGENCY MEDICINE | Admitting: FAMILY MEDICINE
Payer: MEDICARE

## 2023-12-29 DIAGNOSIS — E87.1 HYPONATREMIA: Primary | ICD-10-CM

## 2023-12-29 LAB
ALBUMIN SERPL-MCNC: 3.8 G/DL (ref 3.5–5.2)
ALBUMIN/GLOB SERPL: 1.1 G/DL
ALP SERPL-CCNC: 118 U/L (ref 39–117)
ALT SERPL W P-5'-P-CCNC: 16 U/L (ref 1–41)
ANION GAP SERPL CALCULATED.3IONS-SCNC: 9 MMOL/L (ref 5–15)
AST SERPL-CCNC: 14 U/L (ref 1–40)
B PARAPERT DNA SPEC QL NAA+PROBE: NOT DETECTED
B PERT DNA SPEC QL NAA+PROBE: NOT DETECTED
BILIRUB SERPL-MCNC: 0.3 MG/DL (ref 0–1.2)
BUN SERPL-MCNC: 11 MG/DL (ref 8–23)
BUN/CREAT SERPL: 15.9 (ref 7–25)
C PNEUM DNA NPH QL NAA+NON-PROBE: NOT DETECTED
CALCIUM SPEC-SCNC: 9.4 MG/DL (ref 8.6–10.5)
CARBAMAZEPINE SERPL-MCNC: 3.9 MCG/ML (ref 4–12)
CHLORIDE SERPL-SCNC: 92 MMOL/L (ref 98–107)
CO2 SERPL-SCNC: 27 MMOL/L (ref 22–29)
CREAT SERPL-MCNC: 0.69 MG/DL (ref 0.76–1.27)
DEPRECATED RDW RBC AUTO: 44.6 FL (ref 37–54)
EGFRCR SERPLBLD CKD-EPI 2021: 101.4 ML/MIN/1.73
EOSINOPHIL # BLD MANUAL: 0.58 10*3/MM3 (ref 0–0.4)
EOSINOPHIL NFR BLD MANUAL: 8 % (ref 0.3–6.2)
ERYTHROCYTE [DISTWIDTH] IN BLOOD BY AUTOMATED COUNT: 16.1 % (ref 12.3–15.4)
FLUAV SUBTYP SPEC NAA+PROBE: NOT DETECTED
FLUBV RNA ISLT QL NAA+PROBE: NOT DETECTED
GLOBULIN UR ELPH-MCNC: 3.4 GM/DL
GLUCOSE SERPL-MCNC: 96 MG/DL (ref 65–99)
HADV DNA SPEC NAA+PROBE: NOT DETECTED
HCOV 229E RNA SPEC QL NAA+PROBE: NOT DETECTED
HCOV HKU1 RNA SPEC QL NAA+PROBE: NOT DETECTED
HCOV NL63 RNA SPEC QL NAA+PROBE: NOT DETECTED
HCOV OC43 RNA SPEC QL NAA+PROBE: NOT DETECTED
HCT VFR BLD AUTO: 36.6 % (ref 37.5–51)
HGB BLD-MCNC: 12.2 G/DL (ref 13–17.7)
HMPV RNA NPH QL NAA+NON-PROBE: NOT DETECTED
HOLD SPECIMEN: NORMAL
HPIV1 RNA ISLT QL NAA+PROBE: NOT DETECTED
HPIV2 RNA SPEC QL NAA+PROBE: NOT DETECTED
HPIV3 RNA NPH QL NAA+PROBE: NOT DETECTED
HPIV4 P GENE NPH QL NAA+PROBE: NOT DETECTED
LYMPHOCYTES # BLD MANUAL: 1.94 10*3/MM3 (ref 0.7–3.1)
LYMPHOCYTES NFR BLD MANUAL: 14 % (ref 5–12)
M PNEUMO IGG SER IA-ACNC: NOT DETECTED
MAGNESIUM SERPL-MCNC: 2 MG/DL (ref 1.6–2.4)
MCH RBC QN AUTO: 26.3 PG (ref 26.6–33)
MCHC RBC AUTO-ENTMCNC: 33.3 G/DL (ref 31.5–35.7)
MCV RBC AUTO: 79 FL (ref 79–97)
MONOCYTES # BLD: 1.01 10*3/MM3 (ref 0.1–0.9)
NEUTROPHILS # BLD AUTO: 3.67 10*3/MM3 (ref 1.7–7)
NEUTROPHILS NFR BLD MANUAL: 49 % (ref 42.7–76)
NEUTS BAND NFR BLD MANUAL: 2 % (ref 0–5)
PLAT MORPH BLD: NORMAL
PLATELET # BLD AUTO: 474 10*3/MM3 (ref 140–450)
PMV BLD AUTO: 6.6 FL (ref 6–12)
POTASSIUM SERPL-SCNC: 4.3 MMOL/L (ref 3.5–5.2)
PROT SERPL-MCNC: 7.2 G/DL (ref 6–8.5)
RBC # BLD AUTO: 4.62 10*6/MM3 (ref 4.14–5.8)
RBC MORPH BLD: NORMAL
RHINOVIRUS RNA SPEC NAA+PROBE: NOT DETECTED
RSV RNA NPH QL NAA+NON-PROBE: NOT DETECTED
SARS-COV-2 RNA NPH QL NAA+NON-PROBE: NOT DETECTED
SCAN SLIDE: NORMAL
SODIUM SERPL-SCNC: 128 MMOL/L (ref 136–145)
VARIANT LYMPHS NFR BLD MANUAL: 27 % (ref 19.6–45.3)
WBC MORPH BLD: NORMAL
WBC NRBC COR # BLD AUTO: 7.2 10*3/MM3 (ref 3.4–10.8)

## 2023-12-29 PROCEDURE — 85025 COMPLETE CBC W/AUTO DIFF WBC: CPT | Performed by: EMERGENCY MEDICINE

## 2023-12-29 PROCEDURE — 0202U NFCT DS 22 TRGT SARS-COV-2: CPT | Performed by: FAMILY MEDICINE

## 2023-12-29 PROCEDURE — 85007 BL SMEAR W/DIFF WBC COUNT: CPT | Performed by: EMERGENCY MEDICINE

## 2023-12-29 PROCEDURE — 80156 ASSAY CARBAMAZEPINE TOTAL: CPT | Performed by: EMERGENCY MEDICINE

## 2023-12-29 PROCEDURE — 83735 ASSAY OF MAGNESIUM: CPT | Performed by: EMERGENCY MEDICINE

## 2023-12-29 PROCEDURE — 80053 COMPREHEN METABOLIC PANEL: CPT | Performed by: EMERGENCY MEDICINE

## 2023-12-29 PROCEDURE — 99285 EMERGENCY DEPT VISIT HI MDM: CPT

## 2023-12-29 PROCEDURE — G0378 HOSPITAL OBSERVATION PER HR: HCPCS

## 2023-12-29 RX ORDER — FAMOTIDINE 20 MG/1
40 TABLET, FILM COATED ORAL DAILY
Status: CANCELLED | OUTPATIENT
Start: 2023-12-29

## 2023-12-29 RX ORDER — ENOXAPARIN SODIUM 100 MG/ML
40 INJECTION SUBCUTANEOUS DAILY
Status: DISCONTINUED | OUTPATIENT
Start: 2023-12-29 | End: 2023-12-31 | Stop reason: HOSPADM

## 2023-12-29 RX ORDER — POLYETHYLENE GLYCOL 3350 17 G/17G
17 POWDER, FOR SOLUTION ORAL DAILY PRN
Status: DISCONTINUED | OUTPATIENT
Start: 2023-12-29 | End: 2023-12-31 | Stop reason: HOSPADM

## 2023-12-29 RX ORDER — SODIUM CHLORIDE 0.9 % (FLUSH) 0.9 %
10 SYRINGE (ML) INJECTION AS NEEDED
Status: DISCONTINUED | OUTPATIENT
Start: 2023-12-29 | End: 2023-12-31 | Stop reason: HOSPADM

## 2023-12-29 RX ORDER — MONTELUKAST SODIUM 10 MG/1
10 TABLET ORAL NIGHTLY
Status: DISCONTINUED | OUTPATIENT
Start: 2023-12-29 | End: 2023-12-31 | Stop reason: HOSPADM

## 2023-12-29 RX ORDER — DESMOPRESSIN ACETATE 0.2 MG/1
200 TABLET ORAL NIGHTLY
Status: DISCONTINUED | OUTPATIENT
Start: 2023-12-29 | End: 2023-12-30

## 2023-12-29 RX ORDER — MONTELUKAST SODIUM 10 MG/1
10 TABLET ORAL NIGHTLY
COMMUNITY

## 2023-12-29 RX ORDER — ALUMINA, MAGNESIA, AND SIMETHICONE 2400; 2400; 240 MG/30ML; MG/30ML; MG/30ML
15 SUSPENSION ORAL EVERY 6 HOURS PRN
Status: DISCONTINUED | OUTPATIENT
Start: 2023-12-29 | End: 2023-12-31 | Stop reason: HOSPADM

## 2023-12-29 RX ORDER — FAMOTIDINE 20 MG/1
40 TABLET, FILM COATED ORAL DAILY
Status: DISCONTINUED | OUTPATIENT
Start: 2023-12-29 | End: 2023-12-31 | Stop reason: HOSPADM

## 2023-12-29 RX ORDER — CETIRIZINE HYDROCHLORIDE 10 MG/1
10 TABLET ORAL DAILY
Status: DISCONTINUED | OUTPATIENT
Start: 2023-12-29 | End: 2023-12-31 | Stop reason: HOSPADM

## 2023-12-29 RX ORDER — DULOXETIN HYDROCHLORIDE 30 MG/1
30 CAPSULE, DELAYED RELEASE ORAL DAILY
Status: DISCONTINUED | OUTPATIENT
Start: 2023-12-29 | End: 2023-12-31 | Stop reason: HOSPADM

## 2023-12-29 RX ORDER — SODIUM CHLORIDE 0.9 % (FLUSH) 0.9 %
10 SYRINGE (ML) INJECTION EVERY 12 HOURS SCHEDULED
Status: DISCONTINUED | OUTPATIENT
Start: 2023-12-29 | End: 2023-12-31 | Stop reason: HOSPADM

## 2023-12-29 RX ORDER — GABAPENTIN 300 MG/1
300 CAPSULE ORAL NIGHTLY
Status: DISCONTINUED | OUTPATIENT
Start: 2023-12-29 | End: 2023-12-31 | Stop reason: HOSPADM

## 2023-12-29 RX ORDER — SODIUM BICARBONATE 650 MG/1
650 TABLET ORAL DAILY
Status: DISCONTINUED | OUTPATIENT
Start: 2023-12-29 | End: 2023-12-31 | Stop reason: HOSPADM

## 2023-12-29 RX ORDER — LIDOCAINE 50 MG/G
1 PATCH TOPICAL EVERY 24 HOURS
COMMUNITY

## 2023-12-29 RX ORDER — IBUPROFEN 400 MG/1
800 TABLET ORAL 2 TIMES DAILY WITH MEALS
Status: DISCONTINUED | OUTPATIENT
Start: 2023-12-29 | End: 2023-12-31 | Stop reason: HOSPADM

## 2023-12-29 RX ORDER — AMOXICILLIN 250 MG
2 CAPSULE ORAL 2 TIMES DAILY
Status: DISCONTINUED | OUTPATIENT
Start: 2023-12-29 | End: 2023-12-31 | Stop reason: HOSPADM

## 2023-12-29 RX ORDER — LEVOTHYROXINE SODIUM 0.03 MG/1
25 TABLET ORAL
Status: DISCONTINUED | OUTPATIENT
Start: 2023-12-30 | End: 2023-12-31 | Stop reason: HOSPADM

## 2023-12-29 RX ORDER — ACETAMINOPHEN 500 MG
1000 TABLET ORAL ONCE
Status: COMPLETED | OUTPATIENT
Start: 2023-12-29 | End: 2023-12-29

## 2023-12-29 RX ORDER — BISACODYL 10 MG
10 SUPPOSITORY, RECTAL RECTAL DAILY PRN
Status: DISCONTINUED | OUTPATIENT
Start: 2023-12-29 | End: 2023-12-31 | Stop reason: HOSPADM

## 2023-12-29 RX ORDER — ONDANSETRON 2 MG/ML
4 INJECTION INTRAMUSCULAR; INTRAVENOUS EVERY 6 HOURS PRN
Status: DISCONTINUED | OUTPATIENT
Start: 2023-12-29 | End: 2023-12-31 | Stop reason: HOSPADM

## 2023-12-29 RX ORDER — SODIUM CHLORIDE 9 MG/ML
40 INJECTION, SOLUTION INTRAVENOUS AS NEEDED
Status: DISCONTINUED | OUTPATIENT
Start: 2023-12-29 | End: 2023-12-31 | Stop reason: HOSPADM

## 2023-12-29 RX ORDER — VIT E ACET/GLY/DIMETH/WATER
1 LOTION (ML) TOPICAL 2 TIMES DAILY
COMMUNITY

## 2023-12-29 RX ORDER — ACETAMINOPHEN 325 MG/1
650 TABLET ORAL EVERY 6 HOURS PRN
COMMUNITY

## 2023-12-29 RX ORDER — LIDOCAINE 50 MG/G
1 PATCH TOPICAL EVERY 24 HOURS
Status: DISCONTINUED | OUTPATIENT
Start: 2023-12-29 | End: 2023-12-31 | Stop reason: HOSPADM

## 2023-12-29 RX ORDER — ACETAMINOPHEN 325 MG/1
650 TABLET ORAL EVERY 6 HOURS PRN
Status: DISCONTINUED | OUTPATIENT
Start: 2023-12-29 | End: 2023-12-31 | Stop reason: HOSPADM

## 2023-12-29 RX ORDER — HYDRALAZINE HYDROCHLORIDE 25 MG/1
50 TABLET, FILM COATED ORAL 3 TIMES DAILY
Status: DISCONTINUED | OUTPATIENT
Start: 2023-12-29 | End: 2023-12-31 | Stop reason: HOSPADM

## 2023-12-29 RX ORDER — CARBAMAZEPINE 100 MG/1
200 TABLET, EXTENDED RELEASE ORAL NIGHTLY
Status: DISCONTINUED | OUTPATIENT
Start: 2023-12-29 | End: 2023-12-31 | Stop reason: HOSPADM

## 2023-12-29 RX ORDER — BISACODYL 5 MG/1
5 TABLET, DELAYED RELEASE ORAL DAILY PRN
Status: DISCONTINUED | OUTPATIENT
Start: 2023-12-29 | End: 2023-12-31 | Stop reason: HOSPADM

## 2023-12-29 RX ADMIN — SODIUM BICARBONATE 650 MG: 650 TABLET ORAL at 18:31

## 2023-12-29 RX ADMIN — Medication 10 ML: at 20:33

## 2023-12-29 RX ADMIN — IBUPROFEN 800 MG: 400 TABLET, FILM COATED ORAL at 18:31

## 2023-12-29 RX ADMIN — DULOXETINE HYDROCHLORIDE 30 MG: 30 CAPSULE, DELAYED RELEASE ORAL at 18:32

## 2023-12-29 RX ADMIN — FAMOTIDINE 40 MG: 20 TABLET ORAL at 18:32

## 2023-12-29 RX ADMIN — MONTELUKAST 10 MG: 10 TABLET, FILM COATED ORAL at 20:33

## 2023-12-29 RX ADMIN — HYDRALAZINE HYDROCHLORIDE 50 MG: 25 TABLET, FILM COATED ORAL at 20:33

## 2023-12-29 RX ADMIN — DESMOPRESSIN ACETATE 200 MCG: 0.2 TABLET ORAL at 20:33

## 2023-12-29 RX ADMIN — GABAPENTIN 300 MG: 300 CAPSULE ORAL at 20:33

## 2023-12-29 RX ADMIN — CETIRIZINE HYDROCHLORIDE 10 MG: 10 TABLET, FILM COATED ORAL at 18:31

## 2023-12-29 RX ADMIN — CARBAMAZEPINE 200 MG: 100 TABLET, EXTENDED RELEASE ORAL at 20:32

## 2023-12-29 RX ADMIN — ACETAMINOPHEN 1000 MG: 500 TABLET ORAL at 14:19

## 2023-12-29 NOTE — CASE MANAGEMENT/SOCIAL WORK
Discharge Planning Assessment  [facility]     Patient Name: Marek Wu  MRN: [id number]  Today's Date: 12/29/2023    Admit Date: 12/29/2023    Plan: Return to [facility]   Discharge Needs Assessment       Row Name 12/29/23 1751       Living Environment    People in Home sibling(s)    Name(s) of People in Home Lives with sister    Current Living Arrangements home    Quality of Family Relationships helpful;involved;supportive       Discharge Needs Assessment    Equipment Currently Used at Home cane, quad  back brace    Concerns to be Addressed discharge planning      Row Name 12/29/23 1743       Living Environment    People in Home sibling(s)    Current Living Arrangements home  short term rehab at [facility]    Primary Care Provided by self;other (see comments)    Provides Primary Care For no one    Family Caregiver if Needed sibling(s)    Quality of Family Relationships helpful;involved;supportive    Able to Return to Prior Arrangements yes       Resource/Environmental Concerns    Resource/Environmental Concerns none    Transportation Concerns other (see comments)  may need transportation if sister unable to assist       Transition Planning    Patient/Family Anticipates Transition to inpatient rehabilitation facility  Return to [facility]    Transportation Anticipated public transportation  sister if available may need assistance       Discharge Needs Assessment    Readmission Within the Last 30 Days no previous admission in last 30 days    Concerns to be Addressed discharge planning                   Discharge Plan       Row Name 12/29/23 1747       Plan    Plan Return to [facility]    Plan Comments Spoke with patient at bedside states plans to return to [facility] to complete rehab. stay. Notiifemoe lasion of plan. Confirmed PCP and Pharmacy.      Row Name 12/29/23 1746       Plan    Plan Return [facility]                  Continued Care and Services - Admitted  Since 12/29/2023       Destination       Service Provider Request Status Selected Services Address Phone Fax Patient Preferred    [facility] - [facility] N[facility]A [address] [facility] [place] [address] [phone] --                       Demographic Summary       Row Name 12/29/23 1739       General Information    Admission Type inpatient    Arrived From short stay unit  From [facility]    Referral Source patient    Reason for Consult discharge planning    Preferred Language English                   Functional Status       Row Name 12/29/23 8192       Functional Status    Usual Activity Tolerance moderate    Current Activity Tolerance moderate       Functional Status, IADL    Medications assistive equipment and person    Meal Preparation assistive equipment and person    Housekeeping assistive equipment and person    Laundry assistive equipment and person    Shopping assistive equipment and person       Mental Status    General Appearance WDL WDL           Met with patient in room wearing PPE: mask, face shield/goggles, gloves, gown.      Maintained distance greater than six feet and spent less than 15 minutes in the room.       Liliana Taylor RN

## 2023-12-29 NOTE — DISCHARGE PLACEMENT REQUEST
"Tresa Wu (68 y.o. Male)       Date of Birth   1955    Social Security Number       Address   [address] [place] [address] [address] [facility] [address] [place] [facility]    Home Phone   [phone]    MRN   [id number]       Church   None    Marital Status   Single                            Admission Date   12/29/23    Admission Type   Emergency    Admitting Provider   Danuta Dillon MD    Attending Provider   Danuta Dillon MD    Department, Room/Bed   [facility] EMERGENCY DEPARTMENT, 05/05       Discharge Date       Discharge Disposition       Discharge Destination                                 Attending Provider: Danuta Dillon MD    Allergies: No Known Allergies    Isolation: None   Infection: None   Code Status: CPR    Ht: 195.6 cm (77\")   Wt: 122 kg (268 lb)    Admission Cmt: None   Principal Problem: Hyponatremia [E87.1]                   Active Insurance as of 12/29/2023       Primary Coverage       Payor Plan Insurance Group Employer/Plan Group    MEDICARE MEDICARE A & B        Payor Plan Address Payor Plan Phone Number Payor Plan Fax Number Effective Dates    [address] [phone]  8/1/2013 - None Entered    [place] [address]         Subscriber Name Subscriber Birth Date Member ID       TRESA WU 1955 [id number]                     Emergency Contacts        (Rel.) Home Phone Work Phone Mobile Phone    JUAN MCKEON (Sister) [phone] -- [phone]    SETH MCKEON (Brother) [phone] -- --                "

## 2023-12-29 NOTE — ED NOTES
Chief Complaint   Patient presents with    Abnormal Lab     Pt is currently at [facility] r/t L2 compression fracture. Pt was sent to the ER today r/t low sodium. Pt has a hx of low sodium. Pt states he has a h/a and has had a h/a for four days.

## 2023-12-29 NOTE — H&P
Patient Care Team:  Jeff Jacques MD as PCP - General (Urology)    Chief complaint acute hyponatremia, and unsteady     Subjective     Patient is a 68 y.o. male presents with acute hyponatremia with increased unsteady gait for couple of days, his Na+ was 136 on 12/16, then when repeated today at rehab it was 124 so pt was admitted for further evaluation and treatment .    Review of Systems   Constitutional:  Positive for activity change and fatigue.   Eyes: Negative.    Respiratory: Negative.     Cardiovascular: Negative.    Gastrointestinal: Negative.    Endocrine: Negative.    Genitourinary:  Positive for frequency.   Musculoskeletal:  Positive for back pain and gait problem.   Neurological:  Positive for dizziness, weakness and light-headedness.   Psychiatric/Behavioral:  Positive for sleep disturbance. The patient is nervous/anxious.            History  History reviewed. No pertinent past medical history.  History reviewed. No pertinent surgical history.  History reviewed. No pertinent family history.  Social History     Tobacco Use    Smoking status: Former     Types: Cigarettes    Smokeless tobacco: Never   Vaping Use    Vaping Use: Never used   Substance Use Topics    Alcohol use: Not Currently    Drug use: Not Currently     (Not in a hospital admission)    Allergies:  Patient has no known allergies.    Objective     Vital Signs  Temp:  [97.3 °F (36.3 °C)-98 °F (36.7 °C)] 97.3 °F (36.3 °C)  Heart Rate:  [66-75] 66  Resp:  [16-20] 20  BP: (154-178)/(72-86) 178/86    Physical Exam:       General Appearance:    Alert, cooperative, in no acute distress   Eyes:            Lids and lashes normal, conjunctivae and sclerae normal, no   icterus, no pallor, corneas clear, PERRLA   Ears:    Ears appear intact with no abnormalities noted   Throat:   No oral lesions, no thrush, oral mucosa moist   Neck:   No adenopathy, supple, trachea midline, no thyromegaly, no   carotid bruit, no JVD   Lungs:     Clear to  auscultation,respirations regular, even and                Unlabored     Heart:    Regular rhythm and normal rate, normal S1 and S2, no          murmur, no gallop, no rub, no click   Abdomen:     Normal bowel sounds, no masses, no organomegaly, soft    non-tender, non-distended, no guarding, no rebound                tenderness   Extremities:   Moves all extremities well, no edema, no cyanosis, no           redness   Pulses:   Pulses palpable and equal bilaterally   Skin:   No bleeding, bruising or rash   Neurologic:   Cranial nerves 2 - 12 grossly intact, sensation intact, DTR       present and equal bilaterally       Results Review:  Lab Results (last 48 hours)       Procedure Component Value Units Date/Time    CBC & Differential [[id number]]  (Abnormal) Collected: 12/29/23 1123    Specimen: Blood Updated: 12/29/23 1235    Narrative:      The following orders were created for panel order CBC & Differential.  Procedure                               Abnormality         Status                     ---------                               -----------         ------                     CBC Auto Differential[[id number]]        Abnormal            Final result               Scan Slide[[id number]]                                       Final result                 Please view results for these tests on the individual orders.    CBC Auto Differential [[id number]]  (Abnormal) Collected: 12/29/23 1123    Specimen: Blood Updated: 12/29/23 1235     WBC 7.20 10*3/mm3      RBC 4.62 10*6/mm3      Hemoglobin 12.2 g/dL      Hematocrit 36.6 %      MCV 79.0 fL      MCH 26.3 pg      MCHC 33.3 g/dL      RDW 16.1 %      RDW-SD 44.6 fl      MPV 6.6 fL      Platelets 474 10*3/mm3     Narrative:      The previously reported component NRBC is no longer being reported. Previous result was 0.0 /100 WBC (Reference Range: 0.0-0.2 /100 WBC) on 12/29/2023 at 1208 EST.    Scan Slide [[id number]] Collected: 12/29/23 1123    Specimen: Blood Updated:  12/29/23 1235     Scan Slide --     Comment: See Manual Differential Results       Manual Differential [[id number]]  (Abnormal) Collected: 12/29/23 1123    Specimen: Blood Updated: 12/29/23 1235     Neutrophil % 49.0 %      Lymphocyte % 27.0 %      Monocyte % 14.0 %      Eosinophil % 8.0 %      Bands %  2.0 %      Neutrophils Absolute 3.67 10*3/mm3      Lymphocytes Absolute 1.94 10*3/mm3      Monocytes Absolute 1.01 10*3/mm3      Eosinophils Absolute 0.58 10*3/mm3      RBC Morphology Normal     WBC Morphology Normal     Platelet Morphology Normal    Extra Tubes [[id number]] Collected: 12/29/23 1123    Specimen: Blood, Venous Line Updated: 12/29/23 1230    Narrative:      The following orders were created for panel order Extra Tubes.  Procedure                               Abnormality         Status                     ---------                               -----------         ------                     Green Top (Gel)[[id number]]                                  Final result                 Please view results for these tests on the individual orders.    Green Top (Gel) [[id number]] Collected: 12/29/23 1123    Specimen: Blood Updated: 12/29/23 1230     Extra Tube Hold for add-ons.     Comment: Auto resulted.       Comprehensive Metabolic Panel [[id number]]  (Abnormal) Collected: 12/29/23 1123    Specimen: Blood Updated: 12/29/23 1156     Glucose 96 mg/dL      BUN 11 mg/dL      Creatinine 0.69 mg/dL      Sodium 128 mmol/L      Potassium 4.3 mmol/L      Chloride 92 mmol/L      CO2 27.0 mmol/L      Calcium 9.4 mg/dL      Total Protein 7.2 g/dL      Albumin 3.8 g/dL      ALT (SGPT) 16 U/L      AST (SGOT) 14 U/L      Alkaline Phosphatase 118 U/L      Total Bilirubin 0.3 mg/dL      Globulin 3.4 gm/dL      A/G Ratio 1.1 g/dL      BUN/Creatinine Ratio 15.9     Anion Gap 9.0 mmol/L      eGFR 101.4 mL/min/1.73     Narrative:      GFR Normal >60  Chronic Kidney Disease <60  Kidney Failure <15      Magnesium [[id number]]  (Normal)  Collected: 12/29/23 1123    Specimen: Blood Updated: 12/29/23 1156     Magnesium 2.0 mg/dL     Carbamazepine Level, Total [[id number]]  (Abnormal) Collected: 12/29/23 1123    Specimen: Blood Updated: 12/29/23 1156     Carbamazepine Level 3.9 mcg/mL             Imaging Results (Last 24 Hours)       ** No results found for the last 24 hours. **                 Assessment & Plan     Principal Problem:    Acute Hyponatremia  - admit, fluid restriction, medicine adjustment, renal consult    Unsteady gait - PT/OT    Anemia - monitor closely    HTN  -  Stable    Stress ulcer/DVT prophylaxis -            Plan for disposition: Home at discharge     Danuta Dillon MD  12/29/23  17:25 EST

## 2023-12-29 NOTE — PLAN OF CARE
Goal Outcome Evaluation:  Plan of Care Reviewed With: patient        Progress: no change  Outcome Evaluation: Patient being admitted for hyponatremia. No complaints thus far. Regular diet with fluid restriction. Patient resting comfortably in bed with no noted distress. Call light and urinal within reach.

## 2023-12-29 NOTE — ED PROVIDER NOTES
Subjective   History of Present Illness  Chief complaint abnormal labs    History of present illness this is a 68-year-old gentleman who just got out of hospital on 6 December after having a fall and a L2 compression fracture is in the rehab facility had a sodium drawn yesterday which was 124 and was sent to the hospital for evaluation.  Patient has no complaints he denies any cough congestion chest pain neck arm jaw pain fevers vomiting diarrhea he has had a couple medications changed recently.  Did finish steroids recently.      Review of Systems   Constitutional:  Negative for chills and fever.   Respiratory:  Negative for chest tightness and shortness of breath.    Cardiovascular:  Negative for chest pain and palpitations.   Gastrointestinal:  Negative for abdominal pain, diarrhea and vomiting.   Musculoskeletal:  Positive for back pain.   Skin:  Negative for rash.   Neurological:  Negative for dizziness, weakness and light-headedness.   Psychiatric/Behavioral:  Negative for confusion.        No past medical history on file.    No Known Allergies    No past surgical history on file.    No family history on file.    Social History     Socioeconomic History    Marital status: Single   Tobacco Use    Smoking status: Former     Types: Cigarettes    Smokeless tobacco: Never   Vaping Use    Vaping Use: Never used   Substance and Sexual Activity    Alcohol use: Not Currently    Drug use: Not Currently    Sexual activity: Defer     Prior to Admission medications    Medication Sig Start Date End Date Taking? Authorizing Provider   carBAMazepine XR (TEGretol  XR) 200 MG 12 hr tablet Take 1 tablet by mouth Daily.   Yes Lottie Hernandez MD   desmopressin (DDAVP) 0.2 MG tablet Take 1 tablet by mouth Every Night.   Yes Lottie Hernandez MD   diclofenac sodium (VOTAREN XR) 100 MG 24 hr tablet Take 2 tablets by mouth Daily.   Yes Lottie Hernandez MD   DULoxetine (CYMBALTA) 60 MG capsule Take 1 capsule by mouth  Daily.   Yes Lottie Hernandez MD   famotidine (PEPCID) 40 MG tablet Take 1 tablet by mouth Daily for 30 days. 12/6/23 1/5/24 Yes Danuta Dillon MD   fexofenadine (ALLEGRA) 180 MG tablet Take 1 tablet by mouth Daily.   Yes Lottie Hernandez MD   gabapentin (NEURONTIN) 300 MG capsule Take 1 capsule by mouth Every Night.   Yes Lottie Hernandez MD   hydrALAZINE (APRESOLINE) 50 MG tablet Take 1 tablet by mouth 3 (Three) Times a Day.   Yes Lottie Hernandez MD   levothyroxine (SYNTHROID, LEVOTHROID) 25 MCG tablet Take 1 tablet by mouth Every Morning.   Yes Lottie Hernandez MD   lidocaine (LIDODERM) 5 % Place 1 patch on the skin as directed by provider Daily. Remove & Discard patch within 12 hours or as directed by MD   Yes Lottie Hernandez MD   montelukast (SINGULAIR) 10 MG tablet Take 1 tablet by mouth Every Night.   Yes Lottie Hernandez MD   acetaminophen (TYLENOL) 325 MG tablet Take 2 tablets by mouth Every 6 (Six) Hours As Needed for Mild Pain.    ProviderLottie MD   cetaphil (CETAPHIL) lotion Apply 1 application  topically to the appropriate area as directed 2 (Two) Times a Day.    ProviderLottie MD   dexAMETHasone (DECADRON) 1.5 MG tablet Take 1 tablet by mouth Take As Directed. Day 1 take 3 am, 3 pm ,Day 2 take 3 am, 2 pm,Day 3 take 3 am, 2 pm,Day 4 take 2 am, 2 pm  ,Day 5 take 2 am, 2 pm,Day 6 take 2 am, 1 pm,Day 7 take 2 am, 1 pm, Day 8 take 1 am, 1 pm,Day 9 take 1 am, 1 pm,Day 10 take 1 am 12/4/23 12/29/23  Елена Pierce, CHENG          Objective   Physical Exam  Constitutional 68-year-old gentleman awake alert no distress triage vital signs reviewed HEENT extraocular muscles intact pupils equal round reactive neck supple no adenopathy no meningeal signs lungs clear no retraction heart regular without murmur abdomen soft without tenderness good bowel sounds extremities no edema cords or Homans' sign no evidence of DVT.  Skin warm and dry without rashes  neurologic awake alert and follows commands no face asymmetry speech normal no focal weakness  Procedures           ED Course      Results for orders placed or performed during the hospital encounter of 12/29/23   Comprehensive Metabolic Panel    Specimen: Blood   Result Value Ref Range    Glucose 96 65 - 99 mg/dL    BUN 11 8 - 23 mg/dL    Creatinine 0.69 (L) 0.76 - 1.27 mg/dL    Sodium 128 (L) 136 - 145 mmol/L    Potassium 4.3 3.5 - 5.2 mmol/L    Chloride 92 (L) 98 - 107 mmol/L    CO2 27.0 22.0 - 29.0 mmol/L    Calcium 9.4 8.6 - 10.5 mg/dL    Total Protein 7.2 6.0 - 8.5 g/dL    Albumin 3.8 3.5 - 5.2 g/dL    ALT (SGPT) 16 1 - 41 U/L    AST (SGOT) 14 1 - 40 U/L    Alkaline Phosphatase 118 (H) 39 - 117 U/L    Total Bilirubin 0.3 0.0 - 1.2 mg/dL    Globulin 3.4 gm/dL    A/G Ratio 1.1 g/dL    BUN/Creatinine Ratio 15.9 7.0 - 25.0    Anion Gap 9.0 5.0 - 15.0 mmol/L    eGFR 101.4 >60.0 mL/min/1.73   Magnesium    Specimen: Blood   Result Value Ref Range    Magnesium 2.0 1.6 - 2.4 mg/dL   Carbamazepine Level, Total    Specimen: Blood   Result Value Ref Range    Carbamazepine Level 3.9 (L) 4.0 - 12.0 mcg/mL   CBC Auto Differential    Specimen: Blood   Result Value Ref Range    WBC 7.20 3.40 - 10.80 10*3/mm3    RBC 4.62 4.14 - 5.80 10*6/mm3    Hemoglobin 12.2 (L) 13.0 - 17.7 g/dL    Hematocrit 36.6 (L) 37.5 - 51.0 %    MCV 79.0 79.0 - 97.0 fL    MCH 26.3 (L) 26.6 - 33.0 pg    MCHC 33.3 31.5 - 35.7 g/dL    RDW 16.1 (H) 12.3 - 15.4 %    RDW-SD 44.6 37.0 - 54.0 fl    MPV 6.6 6.0 - 12.0 fL    Platelets 474 (H) 140 - 450 10*3/mm3   Scan Slide    Specimen: Blood   Result Value Ref Range    Scan Slide     Manual Differential    Specimen: Blood   Result Value Ref Range    Neutrophil % 49.0 42.7 - 76.0 %    Lymphocyte % 27.0 19.6 - 45.3 %    Monocyte % 14.0 (H) 5.0 - 12.0 %    Eosinophil % 8.0 (H) 0.3 - 6.2 %    Bands %  2.0 0.0 - 5.0 %    Neutrophils Absolute 3.67 1.70 - 7.00 10*3/mm3    Lymphocytes Absolute 1.94 0.70 - 3.10 10*3/mm3     Monocytes Absolute 1.01 (H) 0.10 - 0.90 10*3/mm3    Eosinophils Absolute 0.58 (H) 0.00 - 0.40 10*3/mm3    RBC Morphology Normal Normal    WBC Morphology Normal Normal    Platelet Morphology Normal Normal   Green Top (Gel)   Result Value Ref Range    Extra Tube Hold for add-ons.      No radiology results for the last day  Medications   sodium chloride 0.9 % flush 10 mL (has no administration in time range)                                              Medical Decision Making  Decision making IV established labs obtained independent reviewed by me patient's comprehensive metabolic profile remarkable for sodium noted to be 128 CBC unremarkable and hemoglobin 12.2 patient's magnesium was 2.  Record reviewed.  Patient had lab done at the [facility] rehab center yesterday it was 124.  Record reviewed here at least charge from the hospital on 12/6/2023 at that time sodium was 135 normal.  Patient had a significant drop.  Recently.  I do not see evidence that suggest water intoxication I will see evidence that suggest an acute cardiac issue abdominal issues stroke or other electrolyte imbalance although not a complete list of all possibilities.  He is on a couple medications that can lower sodium those had just been changed by his primary care doctor sodium is up to 128 from 124 yesterday.  I talked to the primary care provider we discussed the case patient made aware of the findings he will be placed in the hospital for further management stable unremarkable ER course.    Problems Addressed:  Hyponatremia: complicated acute illness or injury    Amount and/or Complexity of Data Reviewed  External Data Reviewed: labs.  Labs: ordered. Decision-making details documented in ED Course.    Risk  Decision regarding hospitalization.        Final diagnoses:   Hyponatremia       ED Disposition  ED Disposition       ED Disposition   Decision to Admit    Condition   --    Comment   Level of Care: Telemetry [5]   Admitting  Physician: OSMAN TINEO [[id number]]   Attending Physician: OSMAN TINEO [[id number]]                 No follow-up provider specified.       Medication List      No changes were made to your prescriptions during this visit.            Toro Jamison MD  12/29/23 [id number]

## 2023-12-29 NOTE — Clinical Note
Level of Care: Telemetry [5]   Admitting Physician: OSMAN TINEO [[id number]]   Attending Physician: OSMAN TINEO [[id number]]

## 2023-12-30 LAB
ALBUMIN SERPL-MCNC: 3.8 G/DL (ref 3.5–5.2)
ALBUMIN/GLOB SERPL: 1.2 G/DL
ALP SERPL-CCNC: 117 U/L (ref 39–117)
ALT SERPL W P-5'-P-CCNC: 15 U/L (ref 1–41)
ANION GAP SERPL CALCULATED.3IONS-SCNC: 10 MMOL/L (ref 5–15)
ANION GAP SERPL CALCULATED.3IONS-SCNC: 9 MMOL/L (ref 5–15)
AST SERPL-CCNC: 13 U/L (ref 1–40)
BILIRUB SERPL-MCNC: 0.4 MG/DL (ref 0–1.2)
BUN SERPL-MCNC: 11 MG/DL (ref 8–23)
BUN SERPL-MCNC: 14 MG/DL (ref 8–23)
BUN/CREAT SERPL: 17.5 (ref 7–25)
BUN/CREAT SERPL: 19.4 (ref 7–25)
CALCIUM SPEC-SCNC: 9.1 MG/DL (ref 8.6–10.5)
CALCIUM SPEC-SCNC: 9.2 MG/DL (ref 8.6–10.5)
CHLORIDE SERPL-SCNC: 94 MMOL/L (ref 98–107)
CHLORIDE SERPL-SCNC: 95 MMOL/L (ref 98–107)
CO2 SERPL-SCNC: 26 MMOL/L (ref 22–29)
CO2 SERPL-SCNC: 27 MMOL/L (ref 22–29)
CREAT SERPL-MCNC: 0.63 MG/DL (ref 0.76–1.27)
CREAT SERPL-MCNC: 0.72 MG/DL (ref 0.76–1.27)
EGFRCR SERPLBLD CKD-EPI 2021: 100.1 ML/MIN/1.73
EGFRCR SERPLBLD CKD-EPI 2021: 104.3 ML/MIN/1.73
GLOBULIN UR ELPH-MCNC: 3.2 GM/DL
GLUCOSE SERPL-MCNC: 108 MG/DL (ref 65–99)
GLUCOSE SERPL-MCNC: 122 MG/DL (ref 65–99)
OSMOLALITY UR: 410 MOSM/KG (ref 300–800)
POTASSIUM SERPL-SCNC: 4.1 MMOL/L (ref 3.5–5.2)
POTASSIUM SERPL-SCNC: 4.5 MMOL/L (ref 3.5–5.2)
PROT SERPL-MCNC: 7 G/DL (ref 6–8.5)
SODIUM SERPL-SCNC: 130 MMOL/L (ref 136–145)
SODIUM SERPL-SCNC: 131 MMOL/L (ref 136–145)
SODIUM UR-SCNC: 85 MMOL/L

## 2023-12-30 PROCEDURE — G0378 HOSPITAL OBSERVATION PER HR: HCPCS

## 2023-12-30 PROCEDURE — 83935 ASSAY OF URINE OSMOLALITY: CPT | Performed by: INTERNAL MEDICINE

## 2023-12-30 PROCEDURE — 25010000002 ENOXAPARIN PER 10 MG: Performed by: FAMILY MEDICINE

## 2023-12-30 PROCEDURE — 84300 ASSAY OF URINE SODIUM: CPT | Performed by: INTERNAL MEDICINE

## 2023-12-30 PROCEDURE — 36415 COLL VENOUS BLD VENIPUNCTURE: CPT | Performed by: FAMILY MEDICINE

## 2023-12-30 PROCEDURE — 80053 COMPREHEN METABOLIC PANEL: CPT | Performed by: FAMILY MEDICINE

## 2023-12-30 RX ORDER — DESMOPRESSIN ACETATE 0.1 MG/1
100 TABLET ORAL NIGHTLY
Status: DISCONTINUED | OUTPATIENT
Start: 2023-12-30 | End: 2023-12-31 | Stop reason: HOSPADM

## 2023-12-30 RX ADMIN — FAMOTIDINE 40 MG: 20 TABLET ORAL at 08:51

## 2023-12-30 RX ADMIN — SENNOSIDES AND DOCUSATE SODIUM 2 TABLET: 50; 8.6 TABLET ORAL at 08:51

## 2023-12-30 RX ADMIN — IBUPROFEN 800 MG: 400 TABLET, FILM COATED ORAL at 17:30

## 2023-12-30 RX ADMIN — LIDOCAINE 1 PATCH: 50 PATCH CUTANEOUS at 17:30

## 2023-12-30 RX ADMIN — GABAPENTIN 300 MG: 300 CAPSULE ORAL at 20:35

## 2023-12-30 RX ADMIN — SODIUM BICARBONATE 650 MG: 650 TABLET ORAL at 08:51

## 2023-12-30 RX ADMIN — CARBAMAZEPINE 200 MG: 100 TABLET, EXTENDED RELEASE ORAL at 20:35

## 2023-12-30 RX ADMIN — Medication 10 ML: at 20:36

## 2023-12-30 RX ADMIN — IBUPROFEN 800 MG: 400 TABLET, FILM COATED ORAL at 08:51

## 2023-12-30 RX ADMIN — ENOXAPARIN SODIUM 40 MG: 100 INJECTION SUBCUTANEOUS at 15:26

## 2023-12-30 RX ADMIN — MONTELUKAST 10 MG: 10 TABLET, FILM COATED ORAL at 20:35

## 2023-12-30 RX ADMIN — ACETAMINOPHEN 650 MG: 325 TABLET, FILM COATED ORAL at 03:34

## 2023-12-30 RX ADMIN — DESMOPRESSIN ACETATE 100 MCG: 0.1 TABLET ORAL at 20:36

## 2023-12-30 RX ADMIN — DULOXETINE HYDROCHLORIDE 30 MG: 30 CAPSULE, DELAYED RELEASE ORAL at 08:51

## 2023-12-30 RX ADMIN — HYDRALAZINE HYDROCHLORIDE 50 MG: 25 TABLET, FILM COATED ORAL at 08:51

## 2023-12-30 RX ADMIN — HYDRALAZINE HYDROCHLORIDE 50 MG: 25 TABLET, FILM COATED ORAL at 20:35

## 2023-12-30 RX ADMIN — SENNOSIDES AND DOCUSATE SODIUM 2 TABLET: 50; 8.6 TABLET ORAL at 20:35

## 2023-12-30 RX ADMIN — HYDRALAZINE HYDROCHLORIDE 50 MG: 25 TABLET, FILM COATED ORAL at 15:26

## 2023-12-30 RX ADMIN — Medication 10 ML: at 08:51

## 2023-12-30 RX ADMIN — CETIRIZINE HYDROCHLORIDE 10 MG: 10 TABLET, FILM COATED ORAL at 08:51

## 2023-12-30 RX ADMIN — LEVOTHYROXINE SODIUM 25 MCG: 0.03 TABLET ORAL at 06:12

## 2023-12-30 RX ADMIN — ACETAMINOPHEN 650 MG: 325 TABLET, FILM COATED ORAL at 15:26

## 2023-12-30 NOTE — PLAN OF CARE
Pt has been pleasant & cooperative. Pt has complained of a headache that was treated with prn tylenol. Pt remains on room air. Plan is for pt to have a CT head done this evening. Will continue to monitor.

## 2023-12-30 NOTE — CONSULTS
INITIAL CONSULT NOTE      Patient Name: Marek Wu  : 1955  MRN: [id number]  Primary Care Physician: Jeff Jacques MD  Date of admission: 2023    Patient Care Team:  Jeff Jacques MD as PCP - General (Urology)        Reason for Consult:       Hyponatremia  Subjective   History of Present Illness:   Chief Complaint:   Chief Complaint   Patient presents with    Abnormal Lab     HISTORY:  Marek Wu is a 68 y.o. male with past medical history of chronic pain, nocturia, depression. who presents with acute hyponatremia sodium level of 124 sodium level was 135 few days back.  And patient also had some headache and some confusion.  After being admitted repeat sodium at 128 which is much better.  Patient was started on desmopressin as per patient few months back initially started at 3 times a day but reduce to once a day by primary care provider few days back.  Sodium level always been low around 130.  Sodium level was 124 at primary care office which is not available to me at this time.  Headache is already improved feeling better than before.          Review of systems:  All other review of system unremarkable  Constitutional: No fever, no chills, no lethargy, no weakness.  HEENT:  No headache, otalgia, itchy eyes, nasal discharge or sore throat.  Cardiac:  No chest pain, dyspnea, orthopnea or PND.  Chest:              No cough, phlegm or wheezing.  Abdomen:  No abdominal pain, nausea or vomiting.  Neuro:  No focal weakness, abnormal movements orseizure like activity.  :   No hematuria, no pyuria, no dysuria, no flank pain.  ROS was otherwise negative except as mentioned in the [place].       Personal History:     Past Medical History: History reviewed. No pertinent past medical history.    Surgical History:    History reviewed. No pertinent surgical history.    Family History: family history is not on file. Otherwise pertinent FHx was reviewed and unremarkable.     Social  History:  reports that he has quit smoking. His smoking use included cigarettes. He has never used smokeless tobacco. He reports that he does not currently use alcohol. He reports that he does not currently use drugs.    Medications:  Prior to Admission medications    Medication Sig Start Date End Date Taking? Authorizing Provider   carBAMazepine XR (TEGretol  XR) 200 MG 12 hr tablet Take 1 tablet by mouth Daily.   Yes Lottie Hernandez MD   desmopressin (DDAVP) 0.2 MG tablet Take 1 tablet by mouth Every Night.   Yes Lottie Hernandez MD   diclofenac sodium (VOTAREN XR) 100 MG 24 hr tablet Take 2 tablets by mouth Daily.   Yes Lottie Hernandez MD   DULoxetine (CYMBALTA) 60 MG capsule Take 1 capsule by mouth Daily.   Yes Lottie Hernandez MD   famotidine (PEPCID) 40 MG tablet Take 1 tablet by mouth Daily for 30 days. 12/6/23 1/5/24 Yes Danuta Dillon MD   fexofenadine (ALLEGRA) 180 MG tablet Take 1 tablet by mouth Daily.   Yes Lottie Hernandez MD   gabapentin (NEURONTIN) 300 MG capsule Take 1 capsule by mouth Every Night.   Yes Lottie Hernandez MD   hydrALAZINE (APRESOLINE) 50 MG tablet Take 1 tablet by mouth 3 (Three) Times a Day.   Yes Lottie Hernandez MD   levothyroxine (SYNTHROID, LEVOTHROID) 25 MCG tablet Take 1 tablet by mouth Every Morning.   Yes Lottie Hernandez MD   lidocaine (LIDODERM) 5 % Place 1 patch on the skin as directed by provider Daily. Remove & Discard patch within 12 hours or as directed by MD   Yes Lottie Hernandez MD   montelukast (SINGULAIR) 10 MG tablet Take 1 tablet by mouth Every Night.   Yes Lottie Hernandez MD   acetaminophen (TYLENOL) 325 MG tablet Take 2 tablets by mouth Every 6 (Six) Hours As Needed for Mild Pain.    Lottie Hernandez MD   cetaphil (CETAPHIL) lotion Apply 1 application  topically to the appropriate area as directed 2 (Two) Times a Day.    Lottie Hernandez MD     Scheduled Meds:carBAMazepine XR, 200 mg,  Oral, Nightly  cetirizine, 10 mg, Oral, Daily  desmopressin, 200 mcg, Oral, Nightly  DULoxetine, 30 mg, Oral, Daily  enoxaparin, 40 mg, Subcutaneous, Daily  famotidine, 40 mg, Oral, Daily  gabapentin, 300 mg, Oral, Nightly  hydrALAZINE, 50 mg, Oral, TID  ibuprofen, 800 mg, Oral, BID With Meals  levothyroxine, 25 mcg, Oral, Q AM  lidocaine, 1 patch, Transdermal, Q24H  montelukast, 10 mg, Oral, Nightly  senna-docusate sodium, 2 tablet, Oral, BID  sodium bicarbonate, 650 mg, Oral, Daily  sodium chloride, 10 mL, Intravenous, Q12H      Continuous Infusions:   PRN Meds:  acetaminophen    aluminum-magnesium hydroxide-simethicone    senna-docusate sodium **AND** polyethylene glycol **AND** bisacodyl **AND** bisacodyl    ondansetron    [COMPLETED] Insert Peripheral IV **AND** sodium chloride    sodium chloride    sodium chloride  Allergies:  No Known Allergies    Objective   Exam:     Vital Signs  Temp:  [97.3 °F (36.3 °C)-98.2 °F (36.8 °C)] 98.2 °F (36.8 °C)  Heart Rate:  [] 70  Resp:  [11-20] 11  BP: (141-178)/(56-86) 161/80  SpO2:  [91 %-99 %] 91 %  on   ;   Device (Oxygen Therapy): room air  Body mass index is 31.78 kg/m².  EXAM  General: Middle-age male in no acute distress.    Head:      Normocephalic and atraumatic.    Eyes:      PERRL/EOM intact, conjunctivae and sclerae clear without nystagmus.    Neck:      No masses, thyromegaly,  trachea central   Lungs:    Clear bilaterally to auscultation.    Heart:      Regular rate and rhythm, no murmur no gallop  Abd:        Soft, nontender, not distended, bowel sounds positive, no shifting dullness.  Msk:        No deformity or scoliosis noted of thoracic or lumbar spine.    Pulses:   Pulses normal in all 4 extremities.    Extremities:        No cyanosis or clubbing--no edema.    Neuro:    No focal deficits.   alert oriented x3  Skin:       Intact without lesions or rashes.    Psych:    Alert and cooperative; normal mood and affect; normal attention span       Results  Review:  I have personally reviewed most recent Data :  [facility]  CBC    Results from last 7 days   Lab Units 12/29/23  1123   WBC 10*3/mm3 7.20   HEMOGLOBIN g/dL 12.2*   PLATELETS 10*3/mm3 474*     CMP   Results from last 7 days   Lab Units 12/29/23  1123   SODIUM mmol/L 128*   POTASSIUM mmol/L 4.3   CHLORIDE mmol/L 92*   CO2 mmol/L 27.0   BUN mg/dL 11   CREATININE mg/dL 0.69*   GLUCOSE mg/dL 96   ALBUMIN g/dL 3.8   BILIRUBIN mg/dL 0.3   ALK PHOS U/L 118*   AST (SGOT) U/L 14   ALT (SGPT) U/L 16     ABG      No radiology results for the last 7 days          Assessment & Plan   Assessment and Plan:         Hyponatremia    ASSESSMENT:  Acute drop in the sodium in the presence of chronic hyponatremia  History of nocturia  Chronic pain  Hypertension          PLAN :     Etiology of hyponatremia is multifactorial in the presence of desmopressin with carbamazepine and also Cymbalta.  It was very difficult to control patient's sodium on these medications  Due to complaint either to stop DDAVP completely at this time otherwise sodium level can drop again  Follow-up with repeat labs again tomorrow morning  If sodium level is stable okay to be discharged  Continue some fluid restriction      Jono Gonzalez MD  [facility]  12/30/2023  07:39 EST

## 2023-12-30 NOTE — PLAN OF CARE
Fair shift spent, medicated as ordered, urinal used to void, left stable, care and observation continues    Goal Outcome Evaluation:    Problem: Adult Inpatient Plan of Care  Goal: Optimal Comfort and Wellbeing  Outcome: Ongoing, Progressing  Intervention: Provide Person-Centered Care  Recent Flowsheet Documentation  Taken 12/30/2023 0000 by Jv Stanton, RN  Trust Relationship/Rapport:   care explained   choices provided   thoughts/feelings acknowledged  Goal: Readiness for Transition of Care  Outcome: Ongoing, Progressing     Problem: Electrolyte Imbalance  Goal: Electrolyte Balance  Outcome: Ongoing, Progressing

## 2023-12-30 NOTE — PROGRESS NOTES
LOS: 0 days   Patient Care Team:  Jeff Jacques MD as PCP - General (Urology)        Subjective  Sitting on bed    Interval History: None    Patient Complaints:  of Headache    Review of Systems   Constitutional:  Positive for activity change and fatigue.   Eyes: Negative.    Respiratory: Negative.     Gastrointestinal: Negative.    Endocrine: Negative.    Genitourinary:  Positive for frequency.   Musculoskeletal:  Positive for back pain and gait problem.   Neurological:  Positive for headaches.   Psychiatric/Behavioral:  The patient is nervous/anxious.         Objective     Vital Signs  Temp:  [97.5 °F (36.4 °C)-98.2 °F (36.8 °C)] 97.5 °F (36.4 °C)  Heart Rate:  [] 78  Resp:  [11-17] 16  BP: (139-168)/(56-86) 139/84    Physical Exam:     General Appearance:    Alert, cooperative, in no acute distress   Ears:    Ears appear intact with no abnormalities noted   Throat:   No oral lesions, no thrush, oral mucosa moist   Neck:   No adenopathy, supple, trachea midline, no thyromegaly, no   carotid bruit, no JVD   Lungs:     Clear to auscultation, respirations regular, even and               Unlabored     Heart:    Regular rhythm and normal rate, normal S1 and S2, no          murmur, no gallop, no rub, no click   Abdomen:     Normal bowel sounds, no masses, no organomegaly, soft      nontender, nondistended, no guarding, no rebound                tenderness   Extremities:   Moves all extremities well, no edema, no cyanosis, no           redness   Skin:   No bleeding, bruising or rash   Neurologic:   Cranial nerves 2 - 12 grossly intact, sensation intact, DTR       present and equal bilaterally        Results Review:    Lab Results (last 24 hours)       Procedure Component Value Units Date/Time    Basic Metabolic Panel [[id number]]  (Abnormal) Collected: 12/30/23 1641    Specimen: Blood Updated: 12/30/23 1723     Glucose 108 mg/dL      BUN 14 mg/dL      Creatinine 0.72 mg/dL      Sodium 131 mmol/L       Potassium 4.5 mmol/L      Chloride 95 mmol/L      CO2 26.0 mmol/L      Calcium 9.1 mg/dL      BUN/Creatinine Ratio 19.4     Anion Gap 10.0 mmol/L      eGFR 100.1 mL/min/1.73     Narrative:      GFR Normal >60  Chronic Kidney Disease <60  Kidney Failure <15      Comprehensive Metabolic Panel [[id number]]  (Abnormal) Collected: 12/30/23 0949    Specimen: Blood Updated: 12/30/23 1026     Glucose 122 mg/dL      BUN 11 mg/dL      Creatinine 0.63 mg/dL      Sodium 130 mmol/L      Potassium 4.1 mmol/L      Chloride 94 mmol/L      CO2 27.0 mmol/L      Calcium 9.2 mg/dL      Total Protein 7.0 g/dL      Albumin 3.8 g/dL      ALT (SGPT) 15 U/L      AST (SGOT) 13 U/L      Alkaline Phosphatase 117 U/L      Total Bilirubin 0.4 mg/dL      Globulin 3.2 gm/dL      A/G Ratio 1.2 g/dL      BUN/Creatinine Ratio 17.5     Anion Gap 9.0 mmol/L      eGFR 104.3 mL/min/1.73     Narrative:      GFR Normal >60  Chronic Kidney Disease <60  Kidney Failure <15      Osmolality, Urine - Urine, Clean Catch [[id number]]  (Normal) Collected: 12/30/23 0950    Specimen: Urine, Clean Catch Updated: 12/30/23 1020     Osmolality, Urine 410 mOsm/kg     Sodium, Urine, Random - Urine, Clean Catch [[id number]] Collected: 12/30/23 0950    Specimen: Urine, Clean Catch Updated: 12/30/23 1016     Sodium, Urine 85 mmol/L     Narrative:      Reference intervals for random urine have not been established.  Clinical usage is dependent upon physician's interpretation in combination with other laboratory tests.       Respiratory Panel PCR w/COVID-19(SARS-CoV-2) [facility] In-House, NP Swab in UTM/VTM, 2 HR TAT - Swab, Nasopharynx [[id number]]  (Normal) Collected: 12/29/23 2040    Specimen: Swab from Nasopharynx Updated: 12/29/23 2136     ADENOVIRUS, PCR Not Detected     Coronavirus 229E Not Detected     Coronavirus HKU1 Not Detected     Coronavirus NL63 Not Detected     Coronavirus OC43 Not Detected     COVID19 Not Detected     Human Metapneumovirus Not  Detected     Human Rhinovirus/Enterovirus Not Detected     Influenza A PCR Not Detected     Influenza B PCR Not Detected     Parainfluenza Virus 1 Not Detected     Parainfluenza Virus 2 Not Detected     Parainfluenza Virus 3 Not Detected     Parainfluenza Virus 4 Not Detected     RSV, PCR Not Detected     Bordetella pertussis pcr Not Detected     Bordetella parapertussis PCR Not Detected     Chlamydophila pneumoniae PCR Not Detected     Mycoplasma pneumo by PCR Not Detected    Narrative:      In the setting of a positive respiratory panel with a viral infection PLUS a negative procalcitonin without other underlying concern for bacterial infection, consider observing off antibiotics or discontinuation of antibiotics and continue supportive care. If the respiratory panel is positive for atypical bacterial infection (Bordetella pertussis, Chlamydophila pneumoniae, or Mycoplasma pneumoniae), consider antibiotic de-escalation to target atypical bacterial infection.           Imaging Results (Last 24 Hours)       ** No results found for the last 24 hours. **             I reviewed the patient's other test results and agree with the interpretation    Medication Review:     Current Facility-Administered Medications:     acetaminophen (TYLENOL) tablet 650 mg, 650 mg, Oral, Q6H PRN, Danuta Dillon MD, 650 mg at 12/30/23 1526    aluminum-magnesium hydroxide-simethicone (MAALOX MAX) 400-400-40 MG/5ML suspension 15 mL, 15 mL, Oral, Q6H PRN, Danuta Dillon MD    sennosides-docusate (PERICOLACE) 8.6-50 MG per tablet 2 tablet, 2 tablet, Oral, BID, 2 tablet at 12/30/23 0851 **AND** polyethylene glycol (MIRALAX) packet 17 g, 17 g, Oral, Daily PRN **AND** bisacodyl (DULCOLAX) EC tablet 5 mg, 5 mg, Oral, Daily PRN **AND** bisacodyl (DULCOLAX) suppository 10 mg, 10 mg, Rectal, Daily PRN, Danuta Dillon MD    carBAMazepine XR (TEGretol  XR) 12 hr tablet 200 mg, 200 mg, Oral, Nightly, Danuta Dillon MD,  200 mg at 12/29/23 2032    cetirizine (zyrTEC) tablet 10 mg, 10 mg, Oral, Daily, Danuta Dillon MD, 10 mg at 12/30/23 0851    desmopressin (DDAVP) tablet 100 mcg, 100 mcg, Oral, Nightly, Jono Gonzalez MD    DULoxetine (CYMBALTA) DR capsule 30 mg, 30 mg, Oral, Daily, Danuta Dillon MD, 30 mg at 12/30/23 0851    Enoxaparin Sodium (LOVENOX) syringe 40 mg, 40 mg, Subcutaneous, Daily, Danuta Dillon MD, 40 mg at 12/30/23 1526    famotidine (PEPCID) tablet 40 mg, 40 mg, Oral, Daily, Danuta Dillon MD, 40 mg at 12/30/23 0851    gabapentin (NEURONTIN) capsule 300 mg, 300 mg, Oral, Nightly, Danuta Dillon MD, 300 mg at 12/29/23 2033    hydrALAZINE (APRESOLINE) tablet 50 mg, 50 mg, Oral, TID, Danuta Dillon MD, 50 mg at 12/30/23 1526    ibuprofen (ADVIL,MOTRIN) tablet 800 mg, 800 mg, Oral, BID With Meals, Danuta Dillon MD, 800 mg at 12/30/23 0851    levothyroxine (SYNTHROID, LEVOTHROID) tablet 25 mcg, 25 mcg, Oral, Q AM, Danuta Dillon MD, 25 mcg at 12/30/23 0612    lidocaine (LIDODERM) 5 % 1 patch, 1 patch, Transdermal, Q24H, Danuta Dillon MD    montelukast (SINGULAIR) tablet 10 mg, 10 mg, Oral, Nightly, Danuta Dillon MD, 10 mg at 12/29/23 2033    ondansetron (ZOFRAN) injection 4 mg, 4 mg, Intravenous, Q6H PRN, Danuta Dillon MD    sodium bicarbonate tablet 650 mg, 650 mg, Oral, Daily, Danuta Dillon MD, 650 mg at 12/30/23 0851    [COMPLETED] Insert Peripheral IV, , , Once **AND** sodium chloride 0.9 % flush 10 mL, 10 mL, Intravenous, PRN, Danuta Dillon MD    sodium chloride 0.9 % flush 10 mL, 10 mL, Intravenous, Q12H, Danuta Dillon MD, 10 mL at 12/30/23 0851    sodium chloride 0.9 % flush 10 mL, 10 mL, Intravenous, PRN, Danuta Dillon MD    sodium chloride 0.9 % infusion 40 mL, 40 mL, Intravenous, PRN, Danuta Dillon MD    Current Outpatient Medications:     carBAMazepine XR  (TEGretol  XR) 200 MG 12 hr tablet, Take 1 tablet by mouth Daily., Disp: , Rfl:     desmopressin (DDAVP) 0.2 MG tablet, Take 1 tablet by mouth Every Night., Disp: , Rfl:     diclofenac sodium (VOTAREN XR) 100 MG 24 hr tablet, Take 2 tablets by mouth Daily., Disp: , Rfl:     DULoxetine (CYMBALTA) 60 MG capsule, Take 1 capsule by mouth Daily., Disp: , Rfl:     famotidine (PEPCID) 40 MG tablet, Take 1 tablet by mouth Daily for 30 days., Disp: 30 tablet, Rfl: 0    fexofenadine (ALLEGRA) 180 MG tablet, Take 1 tablet by mouth Daily., Disp: , Rfl:     gabapentin (NEURONTIN) 300 MG capsule, Take 1 capsule by mouth Every Night., Disp: , Rfl:     hydrALAZINE (APRESOLINE) 50 MG tablet, Take 1 tablet by mouth 3 (Three) Times a Day., Disp: , Rfl:     levothyroxine (SYNTHROID, LEVOTHROID) 25 MCG tablet, Take 1 tablet by mouth Every Morning., Disp: , Rfl:     lidocaine (LIDODERM) 5 %, Place 1 patch on the skin as directed by provider Daily. Remove & Discard patch within 12 hours or as directed by MD, Disp: , Rfl:     montelukast (SINGULAIR) 10 MG tablet, Take 1 tablet by mouth Every Night., Disp: , Rfl:     acetaminophen (TYLENOL) 325 MG tablet, Take 2 tablets by mouth Every 6 (Six) Hours As Needed for Mild Pain., Disp: , Rfl:     cetaphil (CETAPHIL) lotion, Apply 1 application  topically to the appropriate area as directed 2 (Two) Times a Day., Disp: , Rfl:     I have reviewed medication list.    Assessment & Plan     Principal Problem:  Acute Hyponatremia  - Improving, admit, fluid restriction, medicine adjustment, renal consulted    Unsteady gait - PT/OT, Family and pt wants to go to different rehab place, since he did not make any improvement in 3 weeks    Anemia - monitor closely    HTN  -  Stable    Major recurrent depression with Psychotic symptoms - Stable, continue cymbalta and tegretol     Stress ulcer/DVT prophylaxis -            Plan for disposition: Rehab at discharge     Danuta Dillon MD  12/30/23  17:24  EST

## 2023-12-31 ENCOUNTER — APPOINTMENT (OUTPATIENT)
Dept: CT IMAGING | Facility: HOSPITAL | Age: 68
End: 2023-12-31
Payer: MEDICARE

## 2023-12-31 VITALS
RESPIRATION RATE: 18 BRPM | WEIGHT: 275.8 LBS | HEART RATE: 74 BPM | HEIGHT: 77 IN | DIASTOLIC BLOOD PRESSURE: 70 MMHG | OXYGEN SATURATION: 94 % | BODY MASS INDEX: 32.56 KG/M2 | TEMPERATURE: 98.3 F | SYSTOLIC BLOOD PRESSURE: 151 MMHG

## 2023-12-31 PROBLEM — E87.1 HYPONATREMIA: Status: RESOLVED | Noted: 2023-12-29 | Resolved: 2023-12-31

## 2023-12-31 LAB
ALBUMIN SERPL-MCNC: 3.7 G/DL (ref 3.5–5.2)
ALBUMIN/GLOB SERPL: 1.2 G/DL
ALP SERPL-CCNC: 113 U/L (ref 39–117)
ALT SERPL W P-5'-P-CCNC: 14 U/L (ref 1–41)
ANION GAP SERPL CALCULATED.3IONS-SCNC: 9 MMOL/L (ref 5–15)
AST SERPL-CCNC: 13 U/L (ref 1–40)
BILIRUB SERPL-MCNC: 0.2 MG/DL (ref 0–1.2)
BUN SERPL-MCNC: 12 MG/DL (ref 8–23)
BUN/CREAT SERPL: 17.4 (ref 7–25)
CALCIUM SPEC-SCNC: 9.2 MG/DL (ref 8.6–10.5)
CHLORIDE SERPL-SCNC: 97 MMOL/L (ref 98–107)
CO2 SERPL-SCNC: 27 MMOL/L (ref 22–29)
CREAT SERPL-MCNC: 0.69 MG/DL (ref 0.76–1.27)
EGFRCR SERPLBLD CKD-EPI 2021: 101.4 ML/MIN/1.73
GLOBULIN UR ELPH-MCNC: 3.1 GM/DL
GLUCOSE SERPL-MCNC: 101 MG/DL (ref 65–99)
POTASSIUM SERPL-SCNC: 4.4 MMOL/L (ref 3.5–5.2)
PROT SERPL-MCNC: 6.8 G/DL (ref 6–8.5)
SODIUM SERPL-SCNC: 133 MMOL/L (ref 136–145)
URATE SERPL-MCNC: 2.5 MG/DL (ref 3.4–7)

## 2023-12-31 PROCEDURE — 80053 COMPREHEN METABOLIC PANEL: CPT | Performed by: FAMILY MEDICINE

## 2023-12-31 PROCEDURE — 97162 PT EVAL MOD COMPLEX 30 MIN: CPT

## 2023-12-31 PROCEDURE — 70450 CT HEAD/BRAIN W/O DYE: CPT

## 2023-12-31 PROCEDURE — 84550 ASSAY OF BLOOD/URIC ACID: CPT | Performed by: INTERNAL MEDICINE

## 2023-12-31 PROCEDURE — 25010000002 ENOXAPARIN PER 10 MG: Performed by: FAMILY MEDICINE

## 2023-12-31 RX ORDER — DULOXETIN HYDROCHLORIDE 30 MG/1
30 CAPSULE, DELAYED RELEASE ORAL DAILY
Qty: 30 CAPSULE | Refills: 0 | Status: SHIPPED | OUTPATIENT
Start: 2024-01-01 | End: 2024-01-31

## 2023-12-31 RX ORDER — SODIUM BICARBONATE 650 MG/1
650 TABLET ORAL DAILY
Qty: 30 TABLET | Refills: 0 | Status: SHIPPED | OUTPATIENT
Start: 2024-01-01 | End: 2024-01-31

## 2023-12-31 RX ADMIN — ACETAMINOPHEN 650 MG: 325 TABLET, FILM COATED ORAL at 16:30

## 2023-12-31 RX ADMIN — CETIRIZINE HYDROCHLORIDE 10 MG: 10 TABLET, FILM COATED ORAL at 09:34

## 2023-12-31 RX ADMIN — Medication 10 ML: at 09:33

## 2023-12-31 RX ADMIN — SENNOSIDES AND DOCUSATE SODIUM 2 TABLET: 50; 8.6 TABLET ORAL at 09:33

## 2023-12-31 RX ADMIN — HYDRALAZINE HYDROCHLORIDE 50 MG: 25 TABLET, FILM COATED ORAL at 16:30

## 2023-12-31 RX ADMIN — HYDRALAZINE HYDROCHLORIDE 50 MG: 25 TABLET, FILM COATED ORAL at 09:33

## 2023-12-31 RX ADMIN — IBUPROFEN 800 MG: 400 TABLET, FILM COATED ORAL at 09:33

## 2023-12-31 RX ADMIN — FAMOTIDINE 40 MG: 20 TABLET ORAL at 09:33

## 2023-12-31 RX ADMIN — SODIUM BICARBONATE 650 MG: 650 TABLET ORAL at 09:33

## 2023-12-31 RX ADMIN — ENOXAPARIN SODIUM 40 MG: 100 INJECTION SUBCUTANEOUS at 16:30

## 2023-12-31 RX ADMIN — LEVOTHYROXINE SODIUM 25 MCG: 0.03 TABLET ORAL at 05:34

## 2023-12-31 RX ADMIN — DULOXETINE HYDROCHLORIDE 30 MG: 30 CAPSULE, DELAYED RELEASE ORAL at 09:33

## 2023-12-31 NOTE — PROGRESS NOTES
LOS: 0 days   Patient Care Team:  Jeff Jacques MD as PCP - General (Urology)        Subjective  Sitting on bed    Interval History: None    Patient Complaints:  HA resolved,     Review of Systems   Constitutional:  Positive for activity change and fatigue.   Eyes: Negative.    Respiratory: Negative.     Gastrointestinal: Negative.    Endocrine: Negative.    Genitourinary:  Positive for frequency.   Musculoskeletal:  Positive for back pain and gait problem.   Neurological:  Positive for headaches.   Psychiatric/Behavioral:  The patient is nervous/anxious.         Objective     Vital Signs  Temp:  [97.4 °F (36.3 °C)-98.7 °F (37.1 °C)] 98.3 °F (36.8 °C)  Heart Rate:  [74-93] 74  Resp:  [18-19] 18  BP: (126-172)/(70-84) 151/70    Physical Exam:     General Appearance:    Alert, cooperative, in no acute distress   Ears:    Ears appear intact with no abnormalities noted   Throat:   No oral lesions, no thrush, oral mucosa moist   Neck:   No adenopathy, supple, trachea midline, no thyromegaly, no   carotid bruit, no JVD   Lungs:     Clear to auscultation, respirations regular, even and               Unlabored     Heart:    Regular rhythm and normal rate, normal S1 and S2, no          murmur, no gallop, no rub, no click   Abdomen:     Normal bowel sounds, no masses, no organomegaly, soft      nontender, nondistended, no guarding, no rebound                tenderness   Extremities:   Moves all extremities well, no edema, no cyanosis, no           redness   Skin:   No bleeding, bruising or rash   Neurologic:   Cranial nerves 2 - 12 grossly intact, sensation intact, DTR       present and equal bilaterally        Results Review:    Lab Results (last 24 hours)       Procedure Component Value Units Date/Time    Comprehensive Metabolic Panel [[id number]]  (Abnormal) Collected: 12/31/23 0543    Specimen: Blood from Arm, Left Updated: 12/31/23 0614     Glucose 101 mg/dL      BUN 12 mg/dL      Creatinine 0.69 mg/dL       Sodium 133 mmol/L      Potassium 4.4 mmol/L      Chloride 97 mmol/L      CO2 27.0 mmol/L      Calcium 9.2 mg/dL      Total Protein 6.8 g/dL      Albumin 3.7 g/dL      ALT (SGPT) 14 U/L      AST (SGOT) 13 U/L      Alkaline Phosphatase 113 U/L      Total Bilirubin 0.2 mg/dL      Globulin 3.1 gm/dL      A/G Ratio 1.2 g/dL      BUN/Creatinine Ratio 17.4     Anion Gap 9.0 mmol/L      eGFR 101.4 mL/min/1.73     Narrative:      GFR Normal >60  Chronic Kidney Disease <60  Kidney Failure <15      Uric Acid [[id number]]  (Abnormal) Collected: 12/31/23 0543    Specimen: Blood from Arm, Left Updated: 12/31/23 0614     Uric Acid 2.5 mg/dL     Basic Metabolic Panel [[id number]]  (Abnormal) Collected: 12/30/23 1641    Specimen: Blood Updated: 12/30/23 1723     Glucose 108 mg/dL      BUN 14 mg/dL      Creatinine 0.72 mg/dL      Sodium 131 mmol/L      Potassium 4.5 mmol/L      Chloride 95 mmol/L      CO2 26.0 mmol/L      Calcium 9.1 mg/dL      BUN/Creatinine Ratio 19.4     Anion Gap 10.0 mmol/L      eGFR 100.1 mL/min/1.73     Narrative:      GFR Normal >60  Chronic Kidney Disease <60  Kidney Failure <15             Imaging Results (Last 24 Hours)       Procedure Component Value Units Date/Time    CT Head Without Contrast [[id number]] Resulted: 12/31/23 1219     Updated: 12/31/23 0928             I reviewed the patient's other test results and agree with the interpretation    Medication Review:     Current Facility-Administered Medications:     acetaminophen (TYLENOL) tablet 650 mg, 650 mg, Oral, Q6H PRN, Danuta Dillon MD, 650 mg at 12/30/23 1526    aluminum-magnesium hydroxide-simethicone (MAALOX MAX) 400-400-40 MG/5ML suspension 15 mL, 15 mL, Oral, Q6H PRN, Danuta Dillon MD    sennosides-docusate (PERICOLACE) 8.6-50 MG per tablet 2 tablet, 2 tablet, Oral, BID, 2 tablet at 12/31/23 0933 **AND** polyethylene glycol (MIRALAX) packet 17 g, 17 g, Oral, Daily PRN **AND** bisacodyl (DULCOLAX) EC tablet 5 mg, 5 mg, Oral,  Daily PRN **AND** bisacodyl (DULCOLAX) suppository 10 mg, 10 mg, Rectal, Daily PRN, Danuta Dillon MD    carBAMazepine XR (TEGretol  XR) 12 hr tablet 200 mg, 200 mg, Oral, Nightly, Danuta Dillon MD, 200 mg at 12/30/23 2035    cetirizine (zyrTEC) tablet 10 mg, 10 mg, Oral, Daily, Danuta Dillon MD, 10 mg at 12/31/23 0934    desmopressin (DDAVP) tablet 100 mcg, 100 mcg, Oral, Nightly, Jono Gonzalez MD, 100 mcg at 12/30/23 2036    DULoxetine (CYMBALTA) DR capsule 30 mg, 30 mg, Oral, Daily, Danuta Dillon MD, 30 mg at 12/31/23 0933    Enoxaparin Sodium (LOVENOX) syringe 40 mg, 40 mg, Subcutaneous, Daily, Danuta Dillon MD, 40 mg at 12/30/23 1526    famotidine (PEPCID) tablet 40 mg, 40 mg, Oral, Daily, Danuta Dillon MD, 40 mg at 12/31/23 0933    gabapentin (NEURONTIN) capsule 300 mg, 300 mg, Oral, Nightly, Danuta Dillon MD, 300 mg at 12/30/23 2035    hydrALAZINE (APRESOLINE) tablet 50 mg, 50 mg, Oral, TID, Danuta Dillon MD, 50 mg at 12/31/23 0933    ibuprofen (ADVIL,MOTRIN) tablet 800 mg, 800 mg, Oral, BID With Meals, Danuta Dillon MD, 800 mg at 12/31/23 0933    levothyroxine (SYNTHROID, LEVOTHROID) tablet 25 mcg, 25 mcg, Oral, Q AM, Danuta Dillon MD, 25 mcg at 12/31/23 0534    lidocaine (LIDODERM) 5 % 1 patch, 1 patch, Transdermal, Q24H, Danuta Dillon MD, 1 patch at 12/30/23 1730    montelukast (SINGULAIR) tablet 10 mg, 10 mg, Oral, Nightly, Danuta Dillon MD, 10 mg at 12/30/23 2035    ondansetron (ZOFRAN) injection 4 mg, 4 mg, Intravenous, Q6H PRN, Danuta Dillon MD    sodium bicarbonate tablet 650 mg, 650 mg, Oral, Daily, Danuta Dillon MD, 650 mg at 12/31/23 0933    [COMPLETED] Insert Peripheral IV, , , Once **AND** sodium chloride 0.9 % flush 10 mL, 10 mL, Intravenous, PRN, Danuta Dillon MD    sodium chloride 0.9 % flush 10 mL, 10 mL, Intravenous, Q12H, Wilma  MD Danuta, 10 mL at 12/31/23 0933    sodium chloride 0.9 % flush 10 mL, 10 mL, Intravenous, PRN, Danuta Dillon MD    sodium chloride 0.9 % infusion 40 mL, 40 mL, Intravenous, PRN, Danuta Dillon MD    I have reviewed medication list.    Assessment & Plan     Principal Problem:  Acute Hyponatremia  - Much better, admit, fluid restriction, medicine adjustment, renal consulted    Unsteady gait - PT/OT, Family and pt wants to go to different rehab place, since he did not make any improvement in 3 weeks    Anemia - monitor closely    HA - CT pending, resolved    HTN  -  needs better control    Major recurrent depression with Psychotic symptoms - Stable, continue cymbalta and tegretol     Stress ulcer/DVT prophylaxis -            Plan for disposition: Rehab at discharge     Danuta Dillon MD  12/31/23  12:19 EST

## 2023-12-31 NOTE — PLAN OF CARE
Goal Outcome Evaluation:  Plan of Care Reviewed With: patient           Outcome Evaluation: Pt is a 67 YO F admitted with hyponatremia, from SNF. Pt at SNF folloiwng L2 compression frx. Pt reports living with sister and brother in law typically dressing and bathing self and has had 2 recent falls. Pt states he has cane but typically is ambultaing without. Pt this date requires CGA for transfers and ambulation without AD. Pt ambulated 40 feet x2 wiht c/o pain in back in standing. Pt is below baseline, and will benefit from contnued therapy. Plan is for d/c to IP rehab following d/c. PT to follow while admitted.      Anticipated Discharge Disposition (PT): inpatient rehabilitation facility

## 2023-12-31 NOTE — PLAN OF CARE
"Goal Outcome Evaluation:             Alert and oriented x 4. Gait slow and unsteady with walker but better with 2 and walker. Pt voiced hx of suicide attempt many years ago and tearful and embarrassed during our conversation. Several dimple type areas to body for which he said was as a result of \"bed sores\" from that. Will cont to monitor             "

## 2023-12-31 NOTE — PLAN OF CARE
Goal Outcome Evaluation:     Patient to discharge to [facility] today

## 2023-12-31 NOTE — THERAPY EVALUATION
Patient Name: Marek Wu  : 1955    MRN: [id number]                              Today's Date: 2023       Admit Date: 2023    Visit Dx:     ICD-10-CM ICD-9-CM   1. Hyponatremia  E87.1 276.1     Patient Active Problem List   Diagnosis    Hyponatremia     History reviewed. No pertinent past medical history.  History reviewed. No pertinent surgical history.   General Information       Row Name 23 1404          Physical Therapy Time and Intention    Document Type evaluation  -EL     Mode of Treatment physical therapy  -       Row Name 23 1404          General Information    Prior Level of Function independent:;all household mobility;ADL's  -       Row Name 23 1404          Living Environment    People in Home sibling(s)  -       Row Name 23 1404          Stairs Within Home, Primary    Number of Stairs, Within Home, Primary twelve;other (see comments)  flight of steps to bedroom  -       Row Name 23 1404          Cognition    Orientation Status (Cognition) oriented x 4  -       Row Name 23 1404          Safety Issues, Functional Mobility    Impairments Affecting Function (Mobility) range of motion (ROM);strength  -EL               User Key  (r) = Recorded By, (t) = Taken By, (c) = Cosigned By      Initials Name Provider Type    Richard Domingo PT Physical Therapist                   Mobility       Row Name 23 1408          Bed Mobility    Bed Mobility supine-sit  -EL     Supine-Sit [place] (Bed Mobility) contact guard  -EL     Assistive Device (Bed Mobility) bed rails  -       Row Name 23 1408          Sit-Stand Transfer    Sit-Stand [place] (Transfers) contact guard  -EL       Row Name 23 1408          Gait/Stairs (Locomotion)    [place] Level (Gait) contact guard  -EL     Distance in Feet (Gait) 2x40  -EL     Deviations/Abnormal Patterns (Gait) gait speed decreased;stride length decreased  -EL     Comment,  (Gait/Stairs) Noted lateral sway, but no significant LOB  -EL               User Key  (r) = Recorded By, (t) = Taken By, (c) = Cosigned By      Initials Name Provider Type    Richard Domingo PT Physical Therapist                   Obj/Interventions       Row Name 12/31/23 1409          Range of Motion Comprehensive    General Range of Motion bilateral lower extremity ROM WFL  -EL       Row Name 12/31/23 1409          Strength Comprehensive (MMT)    General Manual Muscle Testing (MMT) Assessment upper extremity strength deficits identified  -EL     Comment, General Manual Muscle Testing (MMT) Assessment BLE WFL, LUE 2/5 gross  -EL       Row Name 12/31/23 1409          Balance    Balance Assessment sitting static balance;standing static balance;standing dynamic balance  -EL     Static Sitting Balance independent  -EL     Static Standing Balance contact guard  -EL     Dynamic Standing Balance contact guard  -EL       Row Name 12/31/23 1409          Sensory Assessment (Somatosensory)    Sensory Assessment (Somatosensory) other (see comments)  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  -EL               User Key  (r) = Recorded By, (t) = Taken By, (c) = Cosigned By      Initials Name Provider Type    Richard Domingo PT Physical Therapist                   Goals/Plan       Row Name 12/31/23 1429          Bed Mobility Goal 1 (PT)    Activity/Assistive Device (Bed Mobility Goal 1, PT) bed mobility activities, all  -EL     [place] Level/Cues Needed (Bed Mobility Goal 1, PT) modified independence  -EL     Time Frame (Bed Mobility Goal 1, PT) long term goal (LTG);2 weeks  -EL       Row Name 12/31/23 1429          Transfer Goal 1 (PT)    Activity/Assistive Device (Transfer Goal 1, PT) transfers, all;walker, rolling  -EL     [place] Level/Cues Needed (Transfer Goal 1, PT) modified independence  -EL     Time Frame (Transfer Goal 1, PT) long term goal (LTG);2 weeks  -EL       Row Name 12/31/23 1429          Gait Training Goal 1 (PT)     Activity/Assistive Device (Gait Training Goal 1, PT) gait (walking locomotion);walker, rolling  -EL     [place] Level (Gait Training Goal 1, PT) modified independence  -EL     Distance (Gait Training Goal 1, PT) 150  -EL     Time Frame (Gait Training Goal 1, PT) long term goal (LTG);2 weeks  -EL       Row Name 12/31/23 1429          Stairs Goal 1 (PT)    Activity/Assistive Device (Stairs Goal 1, PT) stairs, all skills  -EL     [place] Level/Cues Needed (Stairs Goal 1, PT) modified independence  -EL     Number of Stairs (Stairs Goal 1, PT) 10  -EL     Time Frame (Stairs Goal 1, PT) long term goal (LTG);2 weeks  -EL       Row Name 12/31/23 1429          Therapy Assessment/Plan (PT)    Planned Therapy Interventions (PT) neuromuscular re-education;balance training;bed mobility training;transfer training;gait training;patient/family education;strengthening  -EL               User Key  (r) = Recorded By, (t) = Taken By, (c) = Cosigned By      Initials Name Provider Type    Richard Domingo, PT Physical Therapist                   Clinical Impression       Row Name 12/31/23 1419          Pain    Pretreatment Pain Rating 0/10 - no pain  -EL     Posttreatment Pain Rating 0/10 - no pain  -EL       Row Name 12/31/23 1419          Plan of Care Review    Plan of Care Reviewed With patient  -EL     Outcome Evaluation Pt is a 67 YO F admitted with hyponatremia, from [facility]. Pt at [facility] folloiwng L2 compression frx. Pt reports living with sister and brother in law typically dressing and bathing self and has had 2 recent falls. Pt states he has cane but typically is ambultaing without. Pt this date requires CGA for transfers and ambulation without AD. Pt ambulated 40 feet x2 wiht c/o pain in back in standing. Pt is below baseline, and will benefit from contnued therapy. Plan is for d/c to IP rehab following d/c. PT to follow while admitted.  -EL       Row Name 12/31/23 1419          Therapy Assessment/Plan (PT)    Rehab Potential  (PT) good, to achieve stated therapy goals  -EL     Criteria for Skilled Interventions Met (PT) yes  -EL     Therapy Frequency (PT) 5 times/wk  -EL     Predicted Duration of Therapy Intervention (PT) until d/c  -EL       Row Name 12/31/23 1419          Vital Signs    O2 Delivery Pre Treatment room air  -EL     O2 Delivery Intra Treatment room air  -EL     O2 Delivery Post Treatment room air  -EL     Pre Patient Position Supine  -EL     Intra Patient Position Standing  -EL     Post Patient Position Supine  -EL       Row Name 12/31/23 1419          Positioning and Restraints    Pre-Treatment Position in bed  -EL     Post Treatment Position bed  -EL     In Bed notified nsg;supine;call light within reach;encouraged to call for assist;exit alarm on  -EL               User Key  (r) = Recorded By, (t) = Taken By, (c) = Cosigned By      Initials Name Provider Type    Richard Domingo, ODRA Physical Therapist                   Outcome Measures       Row Name 12/31/23 1431 12/31/23 0808       How much help from another person do you currently need...    Turning from your back to your side while in flat bed without using bedrails? 4  -EL 4  -CS    Moving from lying on back to sitting on the side of a flat bed without bedrails? 3  -EL 4  -CS    Moving to and from a bed to a chair (including a wheelchair)? 3  -EL 3  -CS    Standing up from a chair using your arms (e.g., wheelchair, bedside chair)? 3  -EL 3  -CS    Climbing 3-5 steps with a railing? 2  -EL 3  -CS    To walk in hospital room? 3  -EL 3  -CS    AM-PAC 6 Clicks Score (PT) 18  -EL 20  -CS    Highest Level of Mobility Goal 6 --> Walk 10 steps or more  -EL 6 --> Walk 10 steps or more  -CS      Row Name 12/31/23 1431          Functional Assessment    Outcome Measure Options AM-PAC 6 Clicks Basic Mobility (PT)  -EL               User Key  (r) = Recorded By, (t) = Taken By, (c) = Cosigned By      Initials Name Provider Type    Richard Domingo, DORA Physical Therapist    CS  Joanne Aguirre, RN Registered Nurse                                 Physical Therapy Education       Title: PT OT SLP Therapies (Done)       Topic: Physical Therapy (Done)       Point: Mobility training (Done)       Learning Progress Summary             Patient Acceptance, E,TB, VU by  at 12/31/2023 1433                         Point: Precautions (Done)       Learning Progress Summary             Patient Acceptance, E,TB, VU by  at 12/31/2023 1433                                         User Key       Initials Effective Dates Name Provider Type Discipline     06/23/20 -  Richard Rowell, PT Physical Therapist PT                  PT Recommendation and Plan  Planned Therapy Interventions (PT): neuromuscular re-education, balance training, bed mobility training, transfer training, gait training, patient/family education, strengthening  Plan of Care Reviewed With: patient  Outcome Evaluation: Pt is a 69 YO F admitted with hyponatremia, from SNF. Pt at [facility] folloiwng L2 compression frx. Pt reports living with sister and brother in law typically dressing and bathing self and has had 2 recent falls. Pt states he has cane but typically is ambultaing without. Pt this date requires CGA for transfers and ambulation without AD. Pt ambulated 40 feet x2 wiht c/o pain in back in standing. Pt is below baseline, and will benefit from contnued therapy. Plan is for d/c to IP rehab following d/c. PT to follow while admitted.     Time Calculation:   PT Evaluation Complexity  History, PT Evaluation Complexity: 1-2 personal factors and/or comorbidities  Examination of Body Systems (PT Eval Complexity): total of 3 or more elements  Clinical Presentation (PT Evaluation Complexity): evolving  Clinical Decision Making (PT Evaluation Complexity): moderate complexity  Overall Complexity (PT Evaluation Complexity): moderate complexity     PT Charges       Row Name 12/31/23 1436             Time Calculation    Start Time 1345  -EL      Stop  Time 1358  -EL      Time Calculation (min) 13 min  -EL      PT Received On 12/31/23  -EL      PT - Next Appointment 01/02/24  -EL      PT Goal Re-Cert Due Date 01/14/24  -EL                User Key  (r) = Recorded By, (t) = Taken By, (c) = Cosigned By      Initials Name Provider Type    Richard Domingo, PT Physical Therapist                  Therapy Charges for Today       Code Description Service Date Service Provider Modifiers Qty    66387483013 HC PT EVAL MOD COMPLEXITY 3 12/31/2023 Richard Rowell, PT GP 1            PT G-Codes  Outcome Measure Options: AM-PAC 6 Clicks Basic Mobility (PT)  AM-PAC 6 Clicks Score (PT): 18  PT Discharge Summary  Anticipated Discharge Disposition (PT): inpatient rehabilitation facility    Richard Rowell PT  12/31/2023

## 2023-12-31 NOTE — DISCHARGE PLACEMENT REQUEST
"Tresa Thomas (68 y.o. Male)       Date of Birth   1955    Social Security Number       Address   [address] [place] [address] [address] [facility] [address] [place] [facility]    Home Phone   [phone]    MRN   [id number]       [employer]   None    Marital Status   Single                            Admission Date   12/29/23    Admission Type   Emergency    Admitting Provider   Danuta Dillon MD    Attending Provider   Danuta Dillon MD    Department, Room/Bed   [facility] SURGICAL INPATIENT, 4103/1       Discharge Date       Discharge Disposition       Discharge Destination                                 Attending Provider: Danuta Dillon MD    Allergies: No Known Allergies    Isolation: None   Infection: None   Code Status: CPR    Ht: 195.6 cm (77\")   Wt: 125 kg (275 lb 12.7 oz)    Admission Cmt: None   Principal Problem: Hyponatremia [E87.1]                   Active Insurance as of 12/29/2023       Primary Coverage       Payor Plan Insurance Group Employer/Plan Group    MEDICARE MEDICARE A & B        Payor Plan Address Payor Plan Phone Number Payor Plan Fax Number Effective Dates    [address] [phone]  8/1/2013 - None Entered    [place] [id number]         Subscriber Name Subscriber Birth Date Member ID       TRESA THOMAS 1955 [id number]                     Emergency Contacts        (Rel.) Home Phone Work Phone Mobile Phone    JUAN MCKEON (Sister) [phone] -- [phone]    SETH MCKEON (Brother) [phone] -- --                 History & Physical        Danuta Dillon MD at 12/29/23 9418              Patient Care Team:  Jeff Jacques MD as PCP - General (Urology)    Chief complaint acute hyponatremia, and unsteady     Subjective    Patient is a 68 y.o. male presents with acute hyponatremia with increased unsteady gait for couple of days, his Na+ was 136 on 12/16, then when repeated today at rehab it was 124 so pt was admitted for " further evaluation and treatment .    Review of Systems   Constitutional:  Positive for activity change and fatigue.   Eyes: Negative.    Respiratory: Negative.     Cardiovascular: Negative.    Gastrointestinal: Negative.    Endocrine: Negative.    Genitourinary:  Positive for frequency.   Musculoskeletal:  Positive for back pain and gait problem.   Neurological:  Positive for dizziness, weakness and light-headedness.   Psychiatric/Behavioral:  Positive for sleep disturbance. The patient is nervous/anxious.            History  History reviewed. No pertinent past medical history.  History reviewed. No pertinent surgical history.  History reviewed. No pertinent family history.  Social History     Tobacco Use    Smoking status: Former     Types: Cigarettes    Smokeless tobacco: Never   Vaping Use    Vaping Use: Never used   Substance Use Topics    Alcohol use: Not Currently    Drug use: Not Currently     (Not in a hospital admission)    Allergies:  Patient has no known allergies.    Objective    Vital Signs  Temp:  [97.3 °F (36.3 °C)-98 °F (36.7 °C)] 97.3 °F (36.3 °C)  Heart Rate:  [66-75] 66  Resp:  [16-20] 20  BP: (154-178)/(72-86) 178/86    Physical Exam:       General Appearance:    Alert, cooperative, in no acute distress   Eyes:            Lids and lashes normal, conjunctivae and sclerae normal, no   icterus, no pallor, corneas clear, PERRLA   Ears:    Ears appear intact with no abnormalities noted   Throat:   No oral lesions, no thrush, oral mucosa moist   Neck:   No adenopathy, supple, trachea midline, no thyromegaly, no   carotid bruit, no JVD   Lungs:     Clear to auscultation,respirations regular, even and                Unlabored     Heart:    Regular rhythm and normal rate, normal S1 and S2, no          murmur, no gallop, no rub, no click   Abdomen:     Normal bowel sounds, no masses, no organomegaly, soft    non-tender, non-distended, no guarding, no rebound                tenderness   Extremities:    Moves all extremities well, no edema, no cyanosis, no           redness   Pulses:   Pulses palpable and equal bilaterally   Skin:   No bleeding, bruising or rash   Neurologic:   Cranial nerves 2 - 12 grossly intact, sensation intact, DTR       present and equal bilaterally       Results Review:  Lab Results (last 48 hours)       Procedure Component Value Units Date/Time    CBC & Differential [[id number]]  (Abnormal) Collected: 12/29/23 1123    Specimen: Blood Updated: 12/29/23 1235    Narrative:      The following orders were created for panel order CBC & Differential.  Procedure                               Abnormality         Status                     ---------                               -----------         ------                     CBC Auto Differential[[id number]]        Abnormal            Final result               Scan Slide[[id number]]                                       Final result                 Please view results for these tests on the individual orders.    CBC Auto Differential [[id number]]  (Abnormal) Collected: 12/29/23 1123    Specimen: Blood Updated: 12/29/23 1235     WBC 7.20 10*3/mm3      RBC 4.62 10*6/mm3      Hemoglobin 12.2 g/dL      Hematocrit 36.6 %      MCV 79.0 fL      MCH 26.3 pg      MCHC 33.3 g/dL      RDW 16.1 %      RDW-SD 44.6 fl      MPV 6.6 fL      Platelets 474 10*3/mm3     Narrative:      The previously reported component NRBC is no longer being reported. Previous result was 0.0 /100 WBC (Reference Range: 0.0-0.2 /100 WBC) on 12/29/2023 at 1208 EST.    Scan Slide [[id number]] Collected: 12/29/23 1123    Specimen: Blood Updated: 12/29/23 1235     Scan Slide --     Comment: See Manual Differential Results       Manual Differential [[id number]]  (Abnormal) Collected: 12/29/23 1123    Specimen: Blood Updated: 12/29/23 1235     Neutrophil % 49.0 %      Lymphocyte % 27.0 %      Monocyte % 14.0 %      Eosinophil % 8.0 %      Bands %  2.0 %      Neutrophils Absolute 3.67 10*3/mm3       Lymphocytes Absolute 1.94 10*3/mm3      Monocytes Absolute 1.01 10*3/mm3      Eosinophils Absolute 0.58 10*3/mm3      RBC Morphology Normal     WBC Morphology Normal     Platelet Morphology Normal    Extra Tubes [[id number]] Collected: 12/29/23 1123    Specimen: Blood, Venous Line Updated: 12/29/23 1230    Narrative:      The following orders were created for panel order Extra Tubes.  Procedure                               Abnormality         Status                     ---------                               -----------         ------                     Green Top (Gel)[[id number]]                                  Final result                 Please view results for these tests on the individual orders.    Green Top (Gel) [[id number]] Collected: 12/29/23 1123    Specimen: Blood Updated: 12/29/23 1230     Extra Tube Hold for add-ons.     Comment: Auto resulted.       Comprehensive Metabolic Panel [[id number]]  (Abnormal) Collected: 12/29/23 1123    Specimen: Blood Updated: 12/29/23 1156     Glucose 96 mg/dL      BUN 11 mg/dL      Creatinine 0.69 mg/dL      Sodium 128 mmol/L      Potassium 4.3 mmol/L      Chloride 92 mmol/L      CO2 27.0 mmol/L      Calcium 9.4 mg/dL      Total Protein 7.2 g/dL      Albumin 3.8 g/dL      ALT (SGPT) 16 U/L      AST (SGOT) 14 U/L      Alkaline Phosphatase 118 U/L      Total Bilirubin 0.3 mg/dL      Globulin 3.4 gm/dL      A/G Ratio 1.1 g/dL      BUN/Creatinine Ratio 15.9     Anion Gap 9.0 mmol/L      eGFR 101.4 mL/min/1.73     Narrative:      GFR Normal >60  Chronic Kidney Disease <60  Kidney Failure <15      Magnesium [[id number]]  (Normal) Collected: 12/29/23 1123    Specimen: Blood Updated: 12/29/23 1156     Magnesium 2.0 mg/dL     Carbamazepine Level, Total [[id number]]  (Abnormal) Collected: 12/29/23 1123    Specimen: Blood Updated: 12/29/23 1156     Carbamazepine Level 3.9 mcg/mL             Imaging Results (Last 24 Hours)       ** No results found for the last 24 hours. **                  Assessment & Plan    Principal Problem:    Acute Hyponatremia  - admit, fluid restriction, medicine adjustment, renal consult    Unsteady gait - PT/OT    Anemia - monitor closely    HTN  -  Stable    Stress ulcer/DVT prophylaxis -            Plan for disposition: Home at discharge     Danuta Dillon MD  12/29/23  17:25 EST        Electronically signed by Danuta Dillon MD at 12/29/23 5020

## 2023-12-31 NOTE — CASE MANAGEMENT/SOCIAL WORK
Case Management Discharge Note      Final Note: [facility]         Selected Continued Care - Discharged on 12/31/2023 Admission date: 12/29/2023 - Discharge disposition: Rehab Facility or Unit (DC - External)      Destination Coordination complete.      Service Provider Selected Services Address Phone Fax Patient Preferred    [facility] [address] [facility] [place] [address] [phone] --              Durable Medical Equipment    No services have been selected for the patient.                Dialysis/Infusion    No services have been selected for the patient.                Home Medical Care    No services have been selected for the patient.                Therapy    No services have been selected for the patient.                Community Resources    No services have been selected for the patient.                Community & DME    No services have been selected for the patient.                    Selected Continued Care - Prior Encounters Includes continued care and service providers with selected services from prior encounters from 9/30/2023 to 12/31/2023      Discharged on 12/6/2023 Admission date: 12/1/2023 - Discharge disposition: Skilled Nursing Facility (DC - External)      Destination       Service Provider Selected Services Address Phone Fax Patient Preferred    [facility] [address] [facility] [place] [address] [phone] --                          Transportation Services  Private: Car    Final Discharge Disposition Code: 62 - inpatient rehab facility

## 2023-12-31 NOTE — CASE MANAGEMENT/SOCIAL WORK
Continued Stay Note  [facility]     Patient Name: Marek Wu  MRN: [id number]  Today's Date: 12/31/2023    Admit Date: 12/29/2023    Plan: [facility] accepted.  bed avail after 5 pm   Discharge Plan       Row Name 12/31/23 1628       Plan    Plan [facility] accepted.  bed avail after 5 pm    Plan Comments cm notified of family and md request for [facility].  referral sent.  requested PT eval.  [facility] accepted with bed avail after 5pm.  family to transport.                   Discharge Codes    No documentation.                 Expected Discharge Date and Time       Expected Discharge Date Expected Discharge Time    Dec 31, 2023               Sol Carpio RN

## 2023-12-31 NOTE — SIGNIFICANT NOTE
12/31/23 1554   OTHER   Discipline occupational therapist   Rehab Time/Intention   Session Not Performed other (see comments)  (Anticipate d/c to rehab this date.)   Recommendation   OT - Next Appointment 01/01/24

## 2023-12-31 NOTE — PROGRESS NOTES
PROGRESS NOTE      Patient Name: Marek Wu  : 1955  MRN: [id number]  Primary Care Physician: Jeff Jacques MD  Date of admission: 2023    Patient Care Team:  Jeff Jacques MD as PCP - General (Urology)        Subjective   Subjective:     Feeling much better sleeping at this time  Review of systems:  All review of system unremarkable      Allergies:  No Known Allergies    Objective   Exam:     Vital Signs  Temp:  [97.5 °F (36.4 °C)-98.7 °F (37.1 °C)] 98.7 °F (37.1 °C)  Heart Rate:  [70-85] 85  Resp:  [16-19] 18  BP: (126-168)/(70-86) 159/78  SpO2:  [88 %-95 %] 94 %  on   ;   Device (Oxygen Therapy): room air  Body mass index is 32.7 kg/m².    General: Middle-age male in no acute distress.    Head:      Normocephalic and atraumatic.    Eyes:      PERRL/EOM intact, conjunctiva and sclera clear with out nystagmus.    Neck:      No masses, thyromegaly,  trachea central with normal respiratory effort   Lungs:    Clear bilaterally to auscultation.    Heart:      Regular rate and rhythm, no murmur no gallop  Abd:        Soft, nontender, not distended, bowel sounds positive, no shifting dullness   Pulses:   Pulses palpable  Extr:        No cyanosis or clubbing--no edema.    Neuro:    No focal deficits.   alert oriented x3  Skin:       Intact without lesions or rashes.    Psych:    Alert and cooperative; normal mood and affect; .      Results Review:  I have personally reviewed most recent Data :  CBC    Results from last 7 days   Lab Units 23  1123   WBC 10*3/mm3 7.20   HEMOGLOBIN g/dL 12.2*   PLATELETS 10*3/mm3 474*     CMP   Results from last 7 days   Lab Units 23  0543 23  1641 23  0949 23  1123   SODIUM mmol/L 133* 131* 130* 128*   POTASSIUM mmol/L 4.4 4.5 4.1 4.3   CHLORIDE mmol/L 97* 95* 94* 92*   CO2 mmol/L 27.0 26.0 27.0 27.0   BUN mg/dL 12 14 11 11   CREATININE mg/dL 0.69* 0.72* 0.63* 0.69*   GLUCOSE mg/dL 101* 108* 122* 96   ALBUMIN g/dL 3.7  --   3.8 3.8   BILIRUBIN mg/dL 0.2  --  0.4 0.3   ALK PHOS U/L 113  --  117 118*   AST (SGOT) U/L 13  --  13 14   ALT (SGPT) U/L 14  --  15 16     ABG      No radiology results for the last day      Scheduled Meds:carBAMazepine XR, 200 mg, Oral, Nightly  cetirizine, 10 mg, Oral, Daily  desmopressin, 100 mcg, Oral, Nightly  DULoxetine, 30 mg, Oral, Daily  enoxaparin, 40 mg, Subcutaneous, Daily  famotidine, 40 mg, Oral, Daily  gabapentin, 300 mg, Oral, Nightly  hydrALAZINE, 50 mg, Oral, TID  ibuprofen, 800 mg, Oral, BID With Meals  levothyroxine, 25 mcg, Oral, Q AM  lidocaine, 1 patch, Transdermal, Q24H  montelukast, 10 mg, Oral, Nightly  senna-docusate sodium, 2 tablet, Oral, BID  sodium bicarbonate, 650 mg, Oral, Daily  sodium chloride, 10 mL, Intravenous, Q12H      Continuous Infusions:   PRN Meds:  acetaminophen    aluminum-magnesium hydroxide-simethicone    senna-docusate sodium **AND** polyethylene glycol **AND** bisacodyl **AND** bisacodyl    ondansetron    [COMPLETED] Insert Peripheral IV **AND** sodium chloride    sodium chloride    sodium chloride    Assessment & Plan   Assessment and Plan:         Hyponatremia    ASSESSMENT:  Acute drop in the sodium in the presence of chronic hyponatremia  History of nocturia  Chronic pain  Hypertension  PLAN :      Etiology of hyponatremia SIADH . etiology of SIADH is multifactorial in the presence of desmopressin with carbamazepine and also Cymbalta.  It was very difficult to control patient's sodium on these medications  I think either we need to stop DDAVP it can drop the sodium further because patient is on multiple medicine can result in SIADH including carbamazepine and Cymbalta  Sodium level stable at this time okay to be discharged  Fluid restriction will be needed as an outpatient  Follow-up with repeat labs again tomorrow morning  If sodium level is stable okay to be discharged  Continue some fluid restriction          Electronically signed by Jono Gonzalez MD,    [facility]  [phone]  12/31/2023  06:46 EST

## 2024-01-04 NOTE — DISCHARGE SUMMARY
Date of Discharge:  12/31/2023    Discharge Diagnosis:   Acute Hyponatremia      Unsteady gait     Anemia     HTN      Major recurrent depression with Psychotic symptoms     Presenting Problem/History of Present Illness  Active Hospital Problems   No active problems to display.      Resolved Hospital Problems    Diagnosis Date Resolved POA    **Hyponatremia [E87.1] 12/31/2023 Yes          Hospital Course  Patient is a 68 y.o. male presented with unsteady gait, acute hyponatremia, not getting better as out pt, did medicine adjustment, NS fluid, improved, and [facility] consulted, they accepted the pt, and all consultants have cleared him to be discharged to rehab.      Procedures Performed         Consults:   Consults       Date and Time Order Name Status Description    12/1/2023  7:30 PM Inpatient Spine Surgery Consult Completed             Pertinent Test Results:  Lab Results (last 24 hours)       Procedure Component Value Units Date/Time    Comprehensive Metabolic Panel [[id number]]  (Abnormal) Collected: 12/31/23 0543    Specimen: Blood from Arm, Left Updated: 12/31/23 0614     Glucose 101 mg/dL      BUN 12 mg/dL      Creatinine 0.69 mg/dL      Sodium 133 mmol/L      Potassium 4.4 mmol/L      Chloride 97 mmol/L      CO2 27.0 mmol/L      Calcium 9.2 mg/dL      Total Protein 6.8 g/dL      Albumin 3.7 g/dL      ALT (SGPT) 14 U/L      AST (SGOT) 13 U/L      Alkaline Phosphatase 113 U/L      Total Bilirubin 0.2 mg/dL      Globulin 3.1 gm/dL      A/G Ratio 1.2 g/dL      BUN/Creatinine Ratio 17.4     Anion Gap 9.0 mmol/L      eGFR 101.4 mL/min/1.73     Narrative:      GFR Normal >60  Chronic Kidney Disease <60  Kidney Failure <15      Uric Acid [[id number]]  (Abnormal) Collected: 12/31/23 0543    Specimen: Blood from Arm, Left Updated: 12/31/23 0614     Uric Acid 2.5 mg/dL            I have reviewed lab results.    Condition on Discharge:  Improved     Vital Signs       Physical Exam:     General Appearance:    Alert,  cooperative, in no acute distress   Ears:    Ears appear intact with no abnormalities noted   Throat:   No oral lesions, no thrush, oral mucosa moist   Neck:   No adenopathy, supple, trachea midline, no thyromegaly, no   carotid bruit, no JVD   Lungs:     Clear to auscultation,respirations regular, even and                Unlabored     Heart:    Regular rhythm and normal rate, normal S1 and S2, no          murmur, no gallop, no rub, no click   Abdomen:     Normal bowel sounds, no masses, no organomegaly, soft      non-tender, non-distended, no guarding, no rebound                tenderness   Extremities:   Moves all extremities well, no edema, no cyanosis, no           redness   Skin:   No bleeding, bruising or rash   Neurologic:   Cranial nerves 2 - 12 grossly intact, sensation intact, DTR       present and equal bilaterally       Discharge Disposition  Rehab Facility or Unit (DC - External)    Discharge Medications     Discharge Medications        New Medications        Instructions Start Date   sodium bicarbonate 650 MG tablet   650 mg, Oral, Daily             Changes to Medications        Instructions Start Date   DULoxetine 30 MG capsule  Commonly known as: CYMBALTA  What changed:   medication strength  how much to take   30 mg, Oral, Daily             Continue These Medications        Instructions Start Date   acetaminophen 325 MG tablet  Commonly known as: TYLENOL   650 mg, Oral, Every 6 Hours PRN      carBAMazepine  MG 12 hr tablet  Commonly known as: TEGretol  XR   200 mg, Oral, Daily      cetaphil lotion   1 application , Topical, 2 Times Daily      desmopressin 0.2 MG tablet  Commonly known as: DDAVP   0.2 mg, Oral, Nightly      famotidine 40 MG tablet  Commonly known as: PEPCID   40 mg, Oral, Daily      fexofenadine 180 MG tablet  Commonly known as: ALLEGRA   180 mg, Oral, Daily      hydrALAZINE 50 MG tablet  Commonly known as: APRESOLINE   50 mg, Oral, 3 Times Daily      levothyroxine 25 MCG  tablet  Commonly known as: SYNTHROID, LEVOTHROID   25 mcg, Oral, Every Early Morning      lidocaine 5 %  Commonly known as: LIDODERM   1 patch, Transdermal, Every 24 Hours, Remove & Discard patch within 12 hours or as directed by MD      montelukast 10 MG tablet  Commonly known as: SINGULAIR   10 mg, Oral, Nightly             Stop These Medications      diclofenac sodium 100 MG 24 hr tablet  Commonly known as: VOTAREN XR     gabapentin 300 MG capsule  Commonly known as: NEURONTIN              Discharge Diet:   Diet Instructions    Regular with a 1200 ml/24 hour fluid restriction           Activity at Discharge:   Activity Instructions    As tolerated           Follow-up Appointments  Future Appointments   Date Time Provider Department Center   1/17/2024 11:00 AM Елена Pierce APRN [facility]     Additional Instructions for the Follow-ups that You Need to Schedule       Call MD With Problems / Concerns   As directed      Instructions: Called [phone] if fever greater then 101, nausea/vomiting, chest pain, shortness of breath or abdominal pain    Order Comments: Instructions: Called [phone] if fever greater then 101, nausea/vomiting, chest pain, shortness of breath or abdominal pain         Discharge Follow-up with PCP   As directed       Currently Documented PCP:    Jeff Jacques MD    PCP Phone Number:    [phone]     Follow Up Details: Hospital Follow up appointment with Dr. Dillon on .   Office number [phone]                Test Results Pending at Discharge       Danuta Dillon MD  01/03/24  21:08 EST

## 2024-01-15 NOTE — PROGRESS NOTES
Subjective   History of Present Illness: Marek Wu is a 68 y.o. male is here today for follow-up of his L2 compression fracture.  He was initially seen and evaluated in the hospital on 12/2/2023 where he was complaining of severe back pain for 1 to 2 weeks.  Patient had no lower extremity complaints.  MRI imaging did demonstrate a moderate L2 compression fracture with approximately 50% loss of height with slight retropulsion resulting in no significant canal stenosis.  There was associated marrow edema suggesting acute to subacute injury.  Patient was recommended medical management with pain medication and bracing.  He was recommended to follow-up in 6 weeks with serial imaging to rule out progressive deformity  Patient presents today with no back pain complaints.  He has been in rehab.  He does report some leg weakness but feels that this may be due to him not getting out and walking is much as he has been in the past.  He describes no leg pain or numbness and tingling.  Patient did report having a sudden onset headache a couple weeks ago and underwent imaging of his head per his PCP.  It demonstrated no acute findings but was suggestive of possible NPH set up.  Patient reports that his headache did self resolve.  He does have some reported leg weakness since being in rehab but reports no gait issues or recent falls.  He does have some chronic urinary incontinence that began after an urologic procedure for his prostate issues.  No reported cognitive issues, confusion, decision making.  He reports no current headache, visual issues, speech issues or unilateral focal sensorimotor deficits      History of Present Illness    The following portions of the patient's history were reviewed and updated as appropriate: allergies, current medications, past family history, past medical history, past social history, past surgical history, and problem list.    Review of Systems   Constitutional:  Positive for activity  "change.   HENT: Negative.     Eyes: Negative.    Respiratory: Negative.     Cardiovascular: Negative.    Gastrointestinal: Negative.    Endocrine: Negative.    Genitourinary: Negative.    Musculoskeletal:  Positive for arthralgias, back pain and myalgias.   Skin: Negative.    Allergic/Immunologic: Negative.    Neurological:  Positive for weakness (bilateral legs).   Hematological: Negative.    Psychiatric/Behavioral:  Positive for sleep disturbance.    All other systems reviewed and are negative.      Objective     ./75   Pulse 95   Ht 195.6 cm (77\")   Wt 121 kg (266 lb 9.6 oz)   BMI 31.61 kg/m²    Body mass index is 31.61 kg/m².    Vitals:    01/17/24 1109   PainSc:   5          Physical Exam  Neurologic Exam      Assessment & Plan   Independent Review of Radiographic Studies:      I personally reviewed the images from the following studies.    Lumbar x-ray 1/16/2024    Lumbar compression deformity noted along superior endplate of L2 that appears to have slightly worsened loss of vertebral height.  Mild kyphosis.  Chronic degenerative changes noted L4-L5 and L5-S1    CT head 12/31/2023    No acute intracranial process.  Ventricles are symmetrical but prominent and possibly disproportionate to underlying atrophy.      Medical Decision Making:      Marek Tejada a 68 y.o. male presenting today as a follow-up to L2 compression fracture.  Clinically, patient doing well in regards to his compression fracture.  He is asymptomatic.  He has no radicular pain.  I told him that he could come out of his brace.  He should continue to practice light activity.  Imaging does show slight worsening vertebral height loss at the L2 compression deformity with some buckling of the cortex that is stable.  Subjective leg weakness not objectively identified and probably most likely to deconditioning we did discuss his CT head results and possible NPH scenario.  Patient's symptoms not overtly consistent with NPH triad.  " Urinary incontinence did began after urologic procedure and while he does have some perceived leg weakness, this sounds more like a deconditioning scenario.  I would like to follow patient up in approximately 8 weeks and see how symptoms are going.  He is going to work on strengthening his legs.  His family member present also will monitor for any cognitive changes.  I did encourage him to call the office with any questions or concerns in the interim.      Diagnoses and all orders for this visit:    1. Normal pressure hydrocephalus (Primary)    2. Compression fracture of L2 vertebra with routine healing, subsequent encounter      Return if symptoms worsen or fail to improve.    This patient was examined wearing appropriate personal protective equipment.     Marek Wu  reports that he has quit smoking. His smoking use included cigarettes. He has never used smokeless tobacco..      Body mass index is 31.61 kg/m².      Patient's blood pressure was reviewed.  Recommendations for  a low-salt diet and exercise to maintain/improve BP in addition to taking any presribed medications.    Advance Care Planning   ACP discussion was held with the patient during this visit. Patient has an advance directive (not in EMR), copy requested.         Елена Pierce, APRN  01/17/24  12:13 EST

## 2024-01-16 ENCOUNTER — HOSPITAL ENCOUNTER (OUTPATIENT)
Dept: GENERAL RADIOLOGY | Facility: HOSPITAL | Age: 69
Discharge: HOME | End: 2024-01-16
Admitting: NURSE PRACTITIONER
Payer: MEDICARE

## 2024-01-16 DIAGNOSIS — S32.020A COMPRESSION FRACTURE OF L2 VERTEBRA, INITIAL ENCOUNTER: ICD-10-CM

## 2024-01-16 PROCEDURE — 72100 X-RAY EXAM L-S SPINE 2/3 VWS: CPT

## 2024-01-17 ENCOUNTER — OFFICE VISIT (OUTPATIENT)
Dept: NEUROSURGERY | Facility: CLINIC | Age: 69
End: 2024-01-17
Payer: MEDICARE

## 2024-01-17 VITALS
BODY MASS INDEX: 31.48 KG/M2 | WEIGHT: 266.6 LBS | HEIGHT: 77 IN | HEART RATE: 95 BPM | SYSTOLIC BLOOD PRESSURE: 137 MMHG | DIASTOLIC BLOOD PRESSURE: 75 MMHG

## 2024-01-17 DIAGNOSIS — S32.020D COMPRESSION FRACTURE OF L2 VERTEBRA WITH ROUTINE HEALING, SUBSEQUENT ENCOUNTER: ICD-10-CM

## 2024-01-17 DIAGNOSIS — G91.2 NORMAL PRESSURE HYDROCEPHALUS: Primary | ICD-10-CM

## 2024-01-17 PROBLEM — S32.020A COMPRESSION FRACTURE OF L2 LUMBAR VERTEBRA: Status: ACTIVE | Noted: 2024-01-17

## 2024-01-18 ENCOUNTER — TELEPHONE (OUTPATIENT)
Dept: NEUROSURGERY | Facility: CLINIC | Age: 69
End: 2024-01-18
Payer: COMMERCIAL

## 2024-01-18 NOTE — TELEPHONE ENCOUNTER
Patient's EC Lincoln MENON (on  Verbal release) called about the patient and his symptoms as he feels that he was not truthful with Елена at his visit yesterday. He is very argumentative. He is concerned about the patient's cognitive decline and could not discuss it yesterday at the visit.

## 2024-01-18 NOTE — TELEPHONE ENCOUNTER
Patient's brother Lincoln called and voiced concern about the patient's cognitive changes. He stated that the patient seems to be confused about things and he feels like he is exhibiting signs of dementia as he has experienced this before with other family members in the past. I did ask Lincoln if the patient has seen his PCP regarding this and he said the patient has an appointment tomorrow with Dr. Dillon in [place] and he did also state that he himself was going to dicuss this with . I did tell him that I will put this information in the patient's chart for Елена to review. He understood.

## 2024-01-19 NOTE — TELEPHONE ENCOUNTER
Called and spoke Lincoln BHANDARI, I let him know that Елена will discuss Marek's symptoms with Dr. Bazzi next week and we will call him with further instructions on Tuesday. He verbalized understanding.

## 2024-01-22 ENCOUNTER — APPOINTMENT (OUTPATIENT)
Dept: CT IMAGING | Facility: HOSPITAL | Age: 69
DRG: 690 | End: 2024-01-22
Payer: MEDICARE

## 2024-01-22 ENCOUNTER — HOSPITAL ENCOUNTER (INPATIENT)
Facility: HOSPITAL | Age: 69
LOS: 2 days | Discharge: NURSING, REHAB OR LONG-TERM CARE | DRG: 690 | End: 2024-01-26
Attending: EMERGENCY MEDICINE | Admitting: HOSPITALIST
Payer: MEDICARE

## 2024-01-22 DIAGNOSIS — R53.1 WEAKNESS: Primary | ICD-10-CM

## 2024-01-22 DIAGNOSIS — R55 NEAR SYNCOPE: ICD-10-CM

## 2024-01-22 PROBLEM — R32 URINARY INCONTINENCE: Status: ACTIVE | Noted: 2024-01-22

## 2024-01-22 LAB
ALBUMIN SERPL-MCNC: 3.8 G/DL (ref 3.5–5.2)
ALBUMIN/GLOB SERPL: 1.1 G/DL
ALP SERPL-CCNC: 176 U/L (ref 39–117)
ALT SERPL W P-5'-P-CCNC: 22 U/L (ref 1–41)
AMORPH URATE CRY URNS QL MICRO: ABNORMAL /HPF
ANION GAP SERPL CALCULATED.3IONS-SCNC: 13 MMOL/L (ref 5–15)
AST SERPL-CCNC: 24 U/L (ref 1–40)
BACTERIA UR QL AUTO: ABNORMAL /HPF
BASOPHILS # BLD AUTO: 0.1 10*3/MM3 (ref 0–0.2)
BASOPHILS NFR BLD AUTO: 0.9 % (ref 0–1.5)
BILIRUB SERPL-MCNC: 0.3 MG/DL (ref 0–1.2)
BILIRUB UR QL STRIP: NEGATIVE
BUN SERPL-MCNC: 16 MG/DL (ref 8–23)
BUN/CREAT SERPL: 17 (ref 7–25)
CALCIUM SPEC-SCNC: 9.4 MG/DL (ref 8.6–10.5)
CHLORIDE SERPL-SCNC: 102 MMOL/L (ref 98–107)
CLARITY UR: ABNORMAL
CO2 SERPL-SCNC: 25 MMOL/L (ref 22–29)
COLOR UR: ABNORMAL
CREAT SERPL-MCNC: 0.94 MG/DL (ref 0.76–1.27)
DEPRECATED RDW RBC AUTO: 46.4 FL (ref 37–54)
EGFRCR SERPLBLD CKD-EPI 2021: 88.9 ML/MIN/1.73
EOSINOPHIL # BLD AUTO: 0.2 10*3/MM3 (ref 0–0.4)
EOSINOPHIL NFR BLD AUTO: 2.9 % (ref 0.3–6.2)
ERYTHROCYTE [DISTWIDTH] IN BLOOD BY AUTOMATED COUNT: 16.8 % (ref 12.3–15.4)
FLUAV RNA RESP QL NAA+PROBE: NOT DETECTED
FLUBV RNA RESP QL NAA+PROBE: NOT DETECTED
GEN 5 2HR TROPONIN T REFLEX: 20 NG/L
GLOBULIN UR ELPH-MCNC: 3.4 GM/DL
GLUCOSE SERPL-MCNC: 97 MG/DL (ref 65–99)
GLUCOSE UR STRIP-MCNC: NEGATIVE MG/DL
HCT VFR BLD AUTO: 41.1 % (ref 37.5–51)
HGB BLD-MCNC: 13.2 G/DL (ref 13–17.7)
HGB UR QL STRIP.AUTO: NEGATIVE
HOLD SPECIMEN: NORMAL
HYALINE CASTS UR QL AUTO: ABNORMAL /LPF
KETONES UR QL STRIP: ABNORMAL
LEUKOCYTE ESTERASE UR QL STRIP.AUTO: ABNORMAL
LYMPHOCYTES # BLD AUTO: 1.7 10*3/MM3 (ref 0.7–3.1)
LYMPHOCYTES NFR BLD AUTO: 23.1 % (ref 19.6–45.3)
MAGNESIUM SERPL-MCNC: 1.9 MG/DL (ref 1.6–2.4)
MCH RBC QN AUTO: 25.6 PG (ref 26.6–33)
MCHC RBC AUTO-ENTMCNC: 32.2 G/DL (ref 31.5–35.7)
MCV RBC AUTO: 79.6 FL (ref 79–97)
MONOCYTES # BLD AUTO: 0.7 10*3/MM3 (ref 0.1–0.9)
MONOCYTES NFR BLD AUTO: 10 % (ref 5–12)
NEUTROPHILS NFR BLD AUTO: 4.6 10*3/MM3 (ref 1.7–7)
NEUTROPHILS NFR BLD AUTO: 63.1 % (ref 42.7–76)
NITRITE UR QL STRIP: POSITIVE
NRBC BLD AUTO-RTO: 0 /100 WBC (ref 0–0.2)
PH UR STRIP.AUTO: 8.5 [PH] (ref 5–8)
PLATELET # BLD AUTO: 521 10*3/MM3 (ref 140–450)
PMV BLD AUTO: 7 FL (ref 6–12)
POTASSIUM SERPL-SCNC: 3.8 MMOL/L (ref 3.5–5.2)
PROT SERPL-MCNC: 7.2 G/DL (ref 6–8.5)
PROT UR QL STRIP: ABNORMAL
RBC # BLD AUTO: 5.16 10*6/MM3 (ref 4.14–5.8)
RBC # UR STRIP: ABNORMAL /HPF
REF LAB TEST METHOD: ABNORMAL
RSV RNA NPH QL NAA+NON-PROBE: NOT DETECTED
SARS-COV-2 RNA RESP QL NAA+PROBE: NOT DETECTED
SODIUM SERPL-SCNC: 140 MMOL/L (ref 136–145)
SP GR UR STRIP: 1.01 (ref 1–1.03)
SQUAMOUS #/AREA URNS HPF: ABNORMAL /HPF
TRI-PHOS CRY URNS QL MICRO: ABNORMAL /HPF
TROPONIN T DELTA: 1 NG/L
TROPONIN T SERPL HS-MCNC: 19 NG/L
TSH SERPL DL<=0.05 MIU/L-ACNC: 3.19 UIU/ML (ref 0.27–4.2)
UROBILINOGEN UR QL STRIP: ABNORMAL
WBC # UR STRIP: ABNORMAL /HPF
WBC NRBC COR # BLD AUTO: 7.3 10*3/MM3 (ref 3.4–10.8)
WHOLE BLOOD HOLD COAG: NORMAL
WHOLE BLOOD HOLD SPECIMEN: NORMAL

## 2024-01-22 PROCEDURE — 87077 CULTURE AEROBIC IDENTIFY: CPT | Performed by: EMERGENCY MEDICINE

## 2024-01-22 PROCEDURE — 93005 ELECTROCARDIOGRAM TRACING: CPT

## 2024-01-22 PROCEDURE — G0378 HOSPITAL OBSERVATION PER HR: HCPCS

## 2024-01-22 PROCEDURE — 99285 EMERGENCY DEPT VISIT HI MDM: CPT

## 2024-01-22 PROCEDURE — 25810000003 SODIUM CHLORIDE 0.9 % SOLUTION: Performed by: HOSPITALIST

## 2024-01-22 PROCEDURE — 83735 ASSAY OF MAGNESIUM: CPT | Performed by: EMERGENCY MEDICINE

## 2024-01-22 PROCEDURE — 84484 ASSAY OF TROPONIN QUANT: CPT | Performed by: EMERGENCY MEDICINE

## 2024-01-22 PROCEDURE — 87086 URINE CULTURE/COLONY COUNT: CPT | Performed by: EMERGENCY MEDICINE

## 2024-01-22 PROCEDURE — 36415 COLL VENOUS BLD VENIPUNCTURE: CPT

## 2024-01-22 PROCEDURE — 84443 ASSAY THYROID STIM HORMONE: CPT | Performed by: EMERGENCY MEDICINE

## 2024-01-22 PROCEDURE — 25810000003 SODIUM CHLORIDE 0.9 % SOLUTION: Performed by: EMERGENCY MEDICINE

## 2024-01-22 PROCEDURE — 93005 ELECTROCARDIOGRAM TRACING: CPT | Performed by: EMERGENCY MEDICINE

## 2024-01-22 PROCEDURE — 87637 SARSCOV2&INF A&B&RSV AMP PRB: CPT | Performed by: HOSPITALIST

## 2024-01-22 PROCEDURE — 80053 COMPREHEN METABOLIC PANEL: CPT | Performed by: EMERGENCY MEDICINE

## 2024-01-22 PROCEDURE — 25010000002 ENOXAPARIN PER 10 MG: Performed by: HOSPITALIST

## 2024-01-22 PROCEDURE — 81001 URINALYSIS AUTO W/SCOPE: CPT | Performed by: EMERGENCY MEDICINE

## 2024-01-22 PROCEDURE — 70450 CT HEAD/BRAIN W/O DYE: CPT

## 2024-01-22 PROCEDURE — 85025 COMPLETE CBC W/AUTO DIFF WBC: CPT | Performed by: EMERGENCY MEDICINE

## 2024-01-22 PROCEDURE — 87186 SC STD MICRODIL/AGAR DIL: CPT | Performed by: EMERGENCY MEDICINE

## 2024-01-22 RX ORDER — SODIUM CHLORIDE 9 MG/ML
40 INJECTION, SOLUTION INTRAVENOUS AS NEEDED
Status: DISCONTINUED | OUTPATIENT
Start: 2024-01-22 | End: 2024-01-26 | Stop reason: HOSPADM

## 2024-01-22 RX ORDER — CHOLECALCIFEROL (VITAMIN D3) 125 MCG
5 CAPSULE ORAL NIGHTLY PRN
Status: DISCONTINUED | OUTPATIENT
Start: 2024-01-22 | End: 2024-01-26 | Stop reason: HOSPADM

## 2024-01-22 RX ORDER — MONTELUKAST SODIUM 10 MG/1
10 TABLET ORAL NIGHTLY
Status: DISCONTINUED | OUTPATIENT
Start: 2024-01-22 | End: 2024-01-26 | Stop reason: HOSPADM

## 2024-01-22 RX ORDER — POLYETHYLENE GLYCOL 3350 17 G/17G
17 POWDER, FOR SOLUTION ORAL DAILY PRN
Status: DISCONTINUED | OUTPATIENT
Start: 2024-01-22 | End: 2024-01-26 | Stop reason: HOSPADM

## 2024-01-22 RX ORDER — ACETAMINOPHEN 325 MG/1
650 TABLET ORAL EVERY 4 HOURS PRN
Status: DISCONTINUED | OUTPATIENT
Start: 2024-01-22 | End: 2024-01-26 | Stop reason: HOSPADM

## 2024-01-22 RX ORDER — ENOXAPARIN SODIUM 100 MG/ML
40 INJECTION SUBCUTANEOUS DAILY
Status: DISCONTINUED | OUTPATIENT
Start: 2024-01-22 | End: 2024-01-26 | Stop reason: HOSPADM

## 2024-01-22 RX ORDER — LEVOTHYROXINE SODIUM 0.03 MG/1
25 TABLET ORAL
Status: DISCONTINUED | OUTPATIENT
Start: 2024-01-23 | End: 2024-01-22

## 2024-01-22 RX ORDER — BISACODYL 5 MG/1
5 TABLET, DELAYED RELEASE ORAL DAILY PRN
Status: DISCONTINUED | OUTPATIENT
Start: 2024-01-22 | End: 2024-01-26 | Stop reason: HOSPADM

## 2024-01-22 RX ORDER — AMOXICILLIN 250 MG
2 CAPSULE ORAL 2 TIMES DAILY
Status: DISCONTINUED | OUTPATIENT
Start: 2024-01-22 | End: 2024-01-26 | Stop reason: HOSPADM

## 2024-01-22 RX ORDER — NITROGLYCERIN 0.4 MG/1
0.4 TABLET SUBLINGUAL
Status: DISCONTINUED | OUTPATIENT
Start: 2024-01-22 | End: 2024-01-26 | Stop reason: HOSPADM

## 2024-01-22 RX ORDER — SODIUM CHLORIDE 0.9 % (FLUSH) 0.9 %
10 SYRINGE (ML) INJECTION EVERY 12 HOURS SCHEDULED
Status: DISCONTINUED | OUTPATIENT
Start: 2024-01-22 | End: 2024-01-26 | Stop reason: HOSPADM

## 2024-01-22 RX ORDER — ONDANSETRON 2 MG/ML
4 INJECTION INTRAMUSCULAR; INTRAVENOUS EVERY 6 HOURS PRN
Status: DISCONTINUED | OUTPATIENT
Start: 2024-01-22 | End: 2024-01-26 | Stop reason: HOSPADM

## 2024-01-22 RX ORDER — SODIUM CHLORIDE 0.9 % (FLUSH) 0.9 %
10 SYRINGE (ML) INJECTION AS NEEDED
Status: DISCONTINUED | OUTPATIENT
Start: 2024-01-22 | End: 2024-01-26 | Stop reason: HOSPADM

## 2024-01-22 RX ORDER — CARBAMAZEPINE 200 MG/1
200 TABLET ORAL 2 TIMES DAILY
COMMUNITY

## 2024-01-22 RX ORDER — DEMECLOCYCLINE HYDROCHLORIDE 300 MG/1
300 TABLET, FILM COATED ORAL 2 TIMES DAILY
COMMUNITY

## 2024-01-22 RX ORDER — BISACODYL 10 MG
10 SUPPOSITORY, RECTAL RECTAL DAILY PRN
Status: DISCONTINUED | OUTPATIENT
Start: 2024-01-22 | End: 2024-01-26 | Stop reason: HOSPADM

## 2024-01-22 RX ORDER — SODIUM CHLORIDE 9 MG/ML
100 INJECTION, SOLUTION INTRAVENOUS CONTINUOUS
Status: DISCONTINUED | OUTPATIENT
Start: 2024-01-22 | End: 2024-01-26 | Stop reason: HOSPADM

## 2024-01-22 RX ADMIN — SODIUM CHLORIDE 1000 ML: 9 INJECTION, SOLUTION INTRAVENOUS at 15:07

## 2024-01-22 RX ADMIN — SODIUM CHLORIDE 100 ML/HR: 9 INJECTION, SOLUTION INTRAVENOUS at 17:23

## 2024-01-22 RX ADMIN — Medication 5 MG: at 20:52

## 2024-01-22 RX ADMIN — Medication 10 ML: at 20:58

## 2024-01-22 RX ADMIN — ENOXAPARIN SODIUM 40 MG: 100 INJECTION SUBCUTANEOUS at 17:23

## 2024-01-22 RX ADMIN — DOCUSATE SODIUM AND SENNOSIDES 2 TABLET: 8.6; 5 TABLET, FILM COATED ORAL at 20:52

## 2024-01-22 RX ADMIN — MONTELUKAST 10 MG: 10 TABLET, FILM COATED ORAL at 20:52

## 2024-01-22 NOTE — Clinical Note
Level of Care: Med/Surg [1]   Diagnosis: Syncope [206001]   Admitting Physician: RUBY RENNER [[id number]]   Attending Physician: RUBY RENNER [[id number]]

## 2024-01-22 NOTE — H&P
[facility]   HISTORY AND PHYSICAL    Patient Name: Marek Wu  : 1955  MRN: [id number]  Primary Care Physician:  Jeff Jacques MD  Date of admission: 2024  Service Date and time: 24 16:20 EST  Subjective   Subjective     Chief Complaint: near syncope    HPI:    Marek Wu is a 68 y.o. male presents to ER after near syncope episode earlier today. He states he does not remember what happened except that he went to Urologist appointment today and has been getting weaker and weaker everyday. He has difficulty with ambulation and gets dizzy upon standing. Urology told him to D/C his oxybutynin and desmopression today. He has had issues with hyponatremia but his Na today is 140. Patient states he feels real weak and has urinary incontinence. They tried to complete orthostatics in the ER but were unable to 2/2 him being unsteady on his feet and dizzy.   He denies chest pain, SOB, abdominal pain, N/V, chills/fever, constipation/diarrhea, cough, flank pain, or blurry vision.     Review of Systems   As per HPI    Personal History     No past medical history on file.    No past surgical history on file.    Family History: family history is not on file. Otherwise pertinent FHx was reviewed and not pertinent to current issue.    Social History:  reports that he has quit smoking. His smoking use included cigarettes. He has never used smokeless tobacco. He reports that he does not currently use alcohol. He reports that he does not currently use drugs.    Home Medications:  DULoxetine, acetaminophen, carBAMazepine XR, cetaphil, desmopressin, fexofenadine, hydrALAZINE, levothyroxine, lidocaine, montelukast, and sodium bicarbonate      Allergies:  No Known Allergies    Objective   Objective     Vitals:   Temp:  [98.4 °F (36.9 °C)] 98.4 °F (36.9 °C)  Heart Rate:  [75-93] 75  Resp:  [20-25] 25  BP: (122-163)/(77-92) 156/87  Physical Exam    Constitutional: Awake, alert   Eyes: PERRLA,  sclerae anicteric, no conjunctival injection   HENT: NCAT, mucous membranes moist   Neck: Supple, no thyromegaly, no lymphadenopathy, trachea midline   Respiratory: Clear to auscultation bilaterally, nonlabored respirations    Cardiovascular: RRR, no murmurs, rubs, or gallops, palpable pedal pulses bilaterally   Gastrointestinal: Positive bowel sounds, soft, nontender, nondistended   Musculoskeletal: No bilateral ankle edema, no clubbing or cyanosis to extremities   Psychiatric: Appropriate affect, cooperative   Neurologic: Oriented x 3, strength symmetric in all extremities, Cranial Nerves grossly intact to confrontation, slowed mentation   Skin: No rashes     Result Review    Result Review:  I have personally reviewed the results from the time of this admission to 1/22/2024 16:20 EST and agree with these findings:  [x]  Laboratory list / accordion  [x]  Microbiology  [x]  Radiology  [x]  EKG/Telemetry   [x]  Cardiology/Vascular   []  Pathology  []  Old records  []  Other:      Assessment & Plan   Assessment / Plan       Active Hospital Problems:  Active Hospital Problems    Diagnosis     **Syncope     Urinary incontinence     Compression fracture of L2 lumbar vertebra      Plan:   - possibly 2/2 orthostatic hypotension, recheck orthostatics when patient able to tolerate, currently gets dizzy upon standing, telemetry  - obtain echo, neurochecks  - IVF  - imaging reviewed  - PT/OT  - trend labs, replace electrolytes    DVT prophylaxis:  Medical DVT prophylaxis orders are present.    CODE STATUS:    Code Status (Patient has no pulse and is not breathing): CPR (Attempt to Resuscitate)  Medical Interventions (Patient has pulse or is breathing): Full Support    Admission Status:  I believe this patient meets observation status.    Mumtaz Goldman MD

## 2024-01-22 NOTE — ED PROVIDER NOTES
"Subjective   History of Present Illness  Chief complaint: Patient is a 68-year-old who has just had progressive increasing generalized weakness.  He felt this way after urology appointment today.  They called the urologist and were asked to come to the emergency department.  He is having difficulty with ambulation.  He was seen admitted for something similar a month ago that had to do with his \"sodium level\".  He has not followed up since for sodium level rechecks that he knows of.  Today he was told to DC oxybutynin and desmopressin from a urologic standpoint.  He has no focal symptoms.  No chest pain.  No shortness of breath.  He did not have a full syncopal event but did feel \"foggy\" on the way home.  This has been progressively worsening since his hospital stay last month.    Context:    Duration:    Timing:    Severity:    Associated Symptoms:        PCP:  LMP:      Review of Systems   Constitutional:  Positive for fatigue.   Respiratory: Negative.     Cardiovascular: Negative.    Gastrointestinal: Negative.    Genitourinary:  Positive for frequency.   Musculoskeletal: Negative.    Neurological:  Positive for weakness and light-headedness.       No past medical history on file.    No Known Allergies    No past surgical history on file.    No family history on file.    Social History     Socioeconomic History    Marital status: Single   Tobacco Use    Smoking status: Former     Types: Cigarettes    Smokeless tobacco: Never   Vaping Use    Vaping Use: Never used   Substance and Sexual Activity    Alcohol use: Not Currently    Drug use: Not Currently    Sexual activity: Defer           Objective   Physical Exam  Vitals and nursing note reviewed.   Constitutional:       Appearance: Normal appearance.   HENT:      Head: Normocephalic and atraumatic.      Mouth/Throat:      Pharynx: Oropharynx is clear.   Cardiovascular:      Rate and Rhythm: Normal rate and regular rhythm.   Pulmonary:      Effort: Pulmonary effort " is normal.      Breath sounds: Normal breath sounds.   Abdominal:      Tenderness: There is no abdominal tenderness.   Musculoskeletal:         General: Normal range of motion.      Cervical back: Normal range of motion and neck supple.   Skin:     General: Skin is warm and dry.   Neurological:      General: No focal deficit present.      Mental Status: He is alert and oriented to person, place, and time.      Cranial Nerves: No cranial nerve deficit.      Sensory: No sensory deficit.      Motor: No weakness.      Coordination: Coordination normal.   Psychiatric:         Mood and Affect: Mood normal.         Thought Content: Thought content normal.         Procedures           ED Course                          Total (NIH Stroke Scale): 1                  Medical Decision Making  Patient was seen evaluated for the above problem    Differential diagnosis includes but is not limited to orthostasis, intracerebral hemorrhage, electrolyte abnormality, hyponatremia    Patient's electrolytes were within normal limits.  His initial EKG interpreted by myself sinus rhythm prolonged NV interval left atrial lodgment rate of 80.  His orthostatics his systolic went from 169 down to 122.  His heart rate increased 10 beats.  He could not stand secondary to weakness and feel like he was going to pass out.  I think orthostasis is part of his problem at this point in time.  However with his inability to stand and ambulate will place in the hospital.  Spoke with  who agrees to admit the patient.  Patient verbalized understanding.    Problems Addressed:  Near syncope: complicated acute illness or injury  Weakness: complicated acute illness or injury    Amount and/or Complexity of Data Reviewed  Labs: ordered. Decision-making details documented in ED Course.     Details: Labs reviewed by myself  Radiology: ordered and independent interpretation performed. Decision-making details documented in ED Course.     Details: CT scan  brain no hemorrhage.  Reviewed by myself  ECG/medicine tests: ordered and independent interpretation performed.     Details: EKG interpreted by myself as above    Risk  Prescription drug management.  Decision regarding hospitalization.        Final diagnoses:   None   Near syncope  Weakness  Orthostasis    ED Disposition  ED Disposition       None            No follow-up provider specified.       Medication List      No changes were made to your prescriptions during this visit.            Attila Jacques DO  01/22/24 1229

## 2024-01-23 ENCOUNTER — APPOINTMENT (OUTPATIENT)
Dept: CARDIOLOGY | Facility: HOSPITAL | Age: 69
DRG: 690 | End: 2024-01-23
Payer: MEDICARE

## 2024-01-23 LAB
ANION GAP SERPL CALCULATED.3IONS-SCNC: 10 MMOL/L (ref 5–15)
BASOPHILS # BLD AUTO: 0.1 10*3/MM3 (ref 0–0.2)
BASOPHILS NFR BLD AUTO: 1.2 % (ref 0–1.5)
BH CV ECHO LEFT VENTRICLE GLOBAL LONGITUDINAL STRAIN: -15.9 %
BH CV ECHO MEAS - ACS: 2.4 CM
BH CV ECHO MEAS - AO MAX PG: 9.5 MMHG
BH CV ECHO MEAS - AO MEAN PG: 5 MMHG
BH CV ECHO MEAS - AO V2 MAX: 154 CM/SEC
BH CV ECHO MEAS - AO V2 VTI: 32.3 CM
BH CV ECHO MEAS - AVA(I,D): 2.36 CM2
BH CV ECHO MEAS - EDV(CUBED): 166.4 ML
BH CV ECHO MEAS - EDV(MOD-SP4): 109 ML
BH CV ECHO MEAS - EF(MOD-BP): 56 %
BH CV ECHO MEAS - EF(MOD-SP4): 56 %
BH CV ECHO MEAS - ESV(CUBED): 64 ML
BH CV ECHO MEAS - ESV(MOD-SP4): 48 ML
BH CV ECHO MEAS - FS: 27.3 %
BH CV ECHO MEAS - IVS/LVPW: 1 CM
BH CV ECHO MEAS - IVSD: 1.1 CM
BH CV ECHO MEAS - LA DIMENSION: 4.3 CM
BH CV ECHO MEAS - LAT PEAK E' VEL: 8.7 CM/SEC
BH CV ECHO MEAS - LV DIASTOLIC VOL/BSA (35-75): 43.5 CM2
BH CV ECHO MEAS - LV MASS(C)D: 242 GRAMS
BH CV ECHO MEAS - LV MAX PG: 3.8 MMHG
BH CV ECHO MEAS - LV MEAN PG: 2 MMHG
BH CV ECHO MEAS - LV SYSTOLIC VOL/BSA (12-30): 19.2 CM2
BH CV ECHO MEAS - LV V1 MAX: 98 CM/SEC
BH CV ECHO MEAS - LV V1 VTI: 22 CM
BH CV ECHO MEAS - LVIDD: 5.5 CM
BH CV ECHO MEAS - LVIDS: 4 CM
BH CV ECHO MEAS - LVOT AREA: 3.5 CM2
BH CV ECHO MEAS - LVOT DIAM: 2.1 CM
BH CV ECHO MEAS - LVPWD: 1.1 CM
BH CV ECHO MEAS - MED PEAK E' VEL: 7.3 CM/SEC
BH CV ECHO MEAS - MV A MAX VEL: 120 CM/SEC
BH CV ECHO MEAS - MV DEC SLOPE: 189 CM/SEC2
BH CV ECHO MEAS - MV DEC TIME: 0.22 SEC
BH CV ECHO MEAS - MV E MAX VEL: 76.5 CM/SEC
BH CV ECHO MEAS - MV E/A: 0.64
BH CV ECHO MEAS - MV MAX PG: 5.6 MMHG
BH CV ECHO MEAS - MV MEAN PG: 2 MMHG
BH CV ECHO MEAS - MV P1/2T: 119 MSEC
BH CV ECHO MEAS - MV V2 VTI: 33.9 CM
BH CV ECHO MEAS - MVA(P1/2T): 1.85 CM2
BH CV ECHO MEAS - MVA(VTI): 2.25 CM2
BH CV ECHO MEAS - PA ACC TIME: 0.12 SEC
BH CV ECHO MEAS - PA V2 MAX: 86.1 CM/SEC
BH CV ECHO MEAS - RAP SYSTOLE: 3 MMHG
BH CV ECHO MEAS - RV MAX PG: 1.98 MMHG
BH CV ECHO MEAS - RV V1 MAX: 70.3 CM/SEC
BH CV ECHO MEAS - RV V1 VTI: 13.1 CM
BH CV ECHO MEAS - RVDD: 2.6 CM
BH CV ECHO MEAS - RVSP: 22.4 MMHG
BH CV ECHO MEAS - SI(MOD-SP4): 24.4 ML/M2
BH CV ECHO MEAS - SV(LVOT): 76.2 ML
BH CV ECHO MEAS - SV(MOD-SP4): 61 ML
BH CV ECHO MEAS - TAPSE (>1.6): 2.48 CM
BH CV ECHO MEAS - TR MAX PG: 19.4 MMHG
BH CV ECHO MEAS - TR MAX VEL: 220 CM/SEC
BH CV ECHO MEASUREMENTS AVERAGE E/E' RATIO: 9.56
BH CV XLRA - TDI S': 10 CM/SEC
BUN SERPL-MCNC: 15 MG/DL (ref 8–23)
BUN/CREAT SERPL: 16.9 (ref 7–25)
CALCIUM SPEC-SCNC: 8.8 MG/DL (ref 8.6–10.5)
CHLORIDE SERPL-SCNC: 107 MMOL/L (ref 98–107)
CHOLEST SERPL-MCNC: 123 MG/DL (ref 0–200)
CO2 SERPL-SCNC: 25 MMOL/L (ref 22–29)
CREAT SERPL-MCNC: 0.89 MG/DL (ref 0.76–1.27)
D-LACTATE SERPL-SCNC: 1.4 MMOL/L (ref 0.5–2)
DEPRECATED RDW RBC AUTO: 46.8 FL (ref 37–54)
EGFRCR SERPLBLD CKD-EPI 2021: 93.9 ML/MIN/1.73
EOSINOPHIL # BLD AUTO: 0.3 10*3/MM3 (ref 0–0.4)
EOSINOPHIL NFR BLD AUTO: 6.1 % (ref 0.3–6.2)
ERYTHROCYTE [DISTWIDTH] IN BLOOD BY AUTOMATED COUNT: 16.4 % (ref 12.3–15.4)
GLUCOSE SERPL-MCNC: 90 MG/DL (ref 65–99)
HCT VFR BLD AUTO: 36.8 % (ref 37.5–51)
HDLC SERPL-MCNC: 27 MG/DL (ref 40–60)
HGB BLD-MCNC: 12 G/DL (ref 13–17.7)
LDLC SERPL CALC-MCNC: 78 MG/DL (ref 0–100)
LDLC/HDLC SERPL: 2.87 {RATIO}
LEFT ATRIUM VOLUME INDEX: 20.7 ML/M2
LYMPHOCYTES # BLD AUTO: 1.8 10*3/MM3 (ref 0.7–3.1)
LYMPHOCYTES NFR BLD AUTO: 34.5 % (ref 19.6–45.3)
MAGNESIUM SERPL-MCNC: 2 MG/DL (ref 1.6–2.4)
MCH RBC QN AUTO: 25.4 PG (ref 26.6–33)
MCHC RBC AUTO-ENTMCNC: 32.6 G/DL (ref 31.5–35.7)
MCV RBC AUTO: 78 FL (ref 79–97)
MONOCYTES # BLD AUTO: 0.6 10*3/MM3 (ref 0.1–0.9)
MONOCYTES NFR BLD AUTO: 12 % (ref 5–12)
NEUTROPHILS NFR BLD AUTO: 2.4 10*3/MM3 (ref 1.7–7)
NEUTROPHILS NFR BLD AUTO: 46.2 % (ref 42.7–76)
NRBC BLD AUTO-RTO: 0.1 /100 WBC (ref 0–0.2)
PLATELET # BLD AUTO: 427 10*3/MM3 (ref 140–450)
PMV BLD AUTO: 6.9 FL (ref 6–12)
POTASSIUM SERPL-SCNC: 3.7 MMOL/L (ref 3.5–5.2)
QT INTERVAL: 380 MS
QTC INTERVAL: 438 MS
RBC # BLD AUTO: 4.72 10*6/MM3 (ref 4.14–5.8)
SINUS: 3.6 CM
SODIUM SERPL-SCNC: 142 MMOL/L (ref 136–145)
TRIGL SERPL-MCNC: 92 MG/DL (ref 0–150)
VLDLC SERPL-MCNC: 18 MG/DL (ref 5–40)
WBC NRBC COR # BLD AUTO: 5.2 10*3/MM3 (ref 3.4–10.8)

## 2024-01-23 PROCEDURE — 85025 COMPLETE CBC W/AUTO DIFF WBC: CPT | Performed by: HOSPITALIST

## 2024-01-23 PROCEDURE — 80061 LIPID PANEL: CPT | Performed by: HOSPITALIST

## 2024-01-23 PROCEDURE — G0378 HOSPITAL OBSERVATION PER HR: HCPCS

## 2024-01-23 PROCEDURE — 36415 COLL VENOUS BLD VENIPUNCTURE: CPT | Performed by: FAMILY MEDICINE

## 2024-01-23 PROCEDURE — 25010000002 CEFTRIAXONE PER 250 MG: Performed by: INTERNAL MEDICINE

## 2024-01-23 PROCEDURE — 25810000003 SODIUM CHLORIDE 0.9 % SOLUTION: Performed by: FAMILY MEDICINE

## 2024-01-23 PROCEDURE — 80048 BASIC METABOLIC PNL TOTAL CA: CPT | Performed by: HOSPITALIST

## 2024-01-23 PROCEDURE — 87040 BLOOD CULTURE FOR BACTERIA: CPT | Performed by: FAMILY MEDICINE

## 2024-01-23 PROCEDURE — 93356 MYOCRD STRAIN IMG SPCKL TRCK: CPT

## 2024-01-23 PROCEDURE — 93306 TTE W/DOPPLER COMPLETE: CPT

## 2024-01-23 PROCEDURE — 93356 MYOCRD STRAIN IMG SPCKL TRCK: CPT | Performed by: INTERNAL MEDICINE

## 2024-01-23 PROCEDURE — 87040 BLOOD CULTURE FOR BACTERIA: CPT | Performed by: INTERNAL MEDICINE

## 2024-01-23 PROCEDURE — 97165 OT EVAL LOW COMPLEX 30 MIN: CPT

## 2024-01-23 PROCEDURE — 93306 TTE W/DOPPLER COMPLETE: CPT | Performed by: INTERNAL MEDICINE

## 2024-01-23 PROCEDURE — 83735 ASSAY OF MAGNESIUM: CPT | Performed by: HOSPITALIST

## 2024-01-23 PROCEDURE — 97162 PT EVAL MOD COMPLEX 30 MIN: CPT

## 2024-01-23 PROCEDURE — 83605 ASSAY OF LACTIC ACID: CPT | Performed by: FAMILY MEDICINE

## 2024-01-23 RX ORDER — SODIUM BICARBONATE 650 MG/1
650 TABLET ORAL DAILY
Status: DISCONTINUED | OUTPATIENT
Start: 2024-01-23 | End: 2024-01-26 | Stop reason: HOSPADM

## 2024-01-23 RX ORDER — DEMECLOCYCLINE HYDROCHLORIDE 150 MG/1
300 TABLET, FILM COATED ORAL 2 TIMES DAILY
Status: DISCONTINUED | OUTPATIENT
Start: 2024-01-23 | End: 2024-01-26 | Stop reason: HOSPADM

## 2024-01-23 RX ORDER — CARBAMAZEPINE 200 MG/1
200 TABLET ORAL 2 TIMES DAILY
Status: DISCONTINUED | OUTPATIENT
Start: 2024-01-23 | End: 2024-01-26 | Stop reason: HOSPADM

## 2024-01-23 RX ORDER — ACETAMINOPHEN 325 MG/1
650 TABLET ORAL EVERY 6 HOURS PRN
Status: DISCONTINUED | OUTPATIENT
Start: 2024-01-23 | End: 2024-01-23 | Stop reason: SDUPTHER

## 2024-01-23 RX ORDER — DULOXETIN HYDROCHLORIDE 30 MG/1
30 CAPSULE, DELAYED RELEASE ORAL DAILY
Status: DISCONTINUED | OUTPATIENT
Start: 2024-01-23 | End: 2024-01-26 | Stop reason: HOSPADM

## 2024-01-23 RX ORDER — HYDRALAZINE HYDROCHLORIDE 25 MG/1
100 TABLET, FILM COATED ORAL 3 TIMES DAILY
Status: DISCONTINUED | OUTPATIENT
Start: 2024-01-23 | End: 2024-01-26 | Stop reason: HOSPADM

## 2024-01-23 RX ORDER — CETIRIZINE HYDROCHLORIDE 10 MG/1
10 TABLET ORAL DAILY
Status: DISCONTINUED | OUTPATIENT
Start: 2024-01-23 | End: 2024-01-26 | Stop reason: HOSPADM

## 2024-01-23 RX ADMIN — DEMECLOCYCLINE HYDROCHLORIDE 300 MG: 150 TABLET, FILM COATED ORAL at 21:35

## 2024-01-23 RX ADMIN — SODIUM BICARBONATE 650 MG: 650 TABLET ORAL at 20:32

## 2024-01-23 RX ADMIN — HYDRALAZINE HYDROCHLORIDE 100 MG: 25 TABLET ORAL at 20:32

## 2024-01-23 RX ADMIN — Medication 5 MG: at 20:33

## 2024-01-23 RX ADMIN — MONTELUKAST 10 MG: 10 TABLET, FILM COATED ORAL at 20:32

## 2024-01-23 RX ADMIN — CEFTRIAXONE 1000 MG: 1 INJECTION, POWDER, FOR SOLUTION INTRAMUSCULAR; INTRAVENOUS at 18:04

## 2024-01-23 RX ADMIN — DULOXETINE HYDROCHLORIDE 30 MG: 30 CAPSULE, DELAYED RELEASE ORAL at 20:32

## 2024-01-23 RX ADMIN — CARBAMAZEPINE 200 MG: 200 TABLET ORAL at 21:35

## 2024-01-23 RX ADMIN — SODIUM CHLORIDE 100 ML/HR: 9 INJECTION, SOLUTION INTRAVENOUS at 21:16

## 2024-01-23 RX ADMIN — CETIRIZINE HYDROCHLORIDE 10 MG: 10 TABLET, FILM COATED ORAL at 20:32

## 2024-01-23 RX ADMIN — Medication 10 ML: at 20:32

## 2024-01-23 RX ADMIN — DOCUSATE SODIUM AND SENNOSIDES 2 TABLET: 8.6; 5 TABLET, FILM COATED ORAL at 20:33

## 2024-01-23 NOTE — PLAN OF CARE
Goal Outcome Evaluation: Patient presented to ED with shortness of breath,generalized weakness and near syncopal episode. No complaints at this time.

## 2024-01-23 NOTE — CASE MANAGEMENT/SOCIAL WORK
Discharge Planning Assessment   Jose L     Patient Name: Marek Wu  MRN: [id number]  Today's Date: 1/23/2024    Admit Date: 1/22/2024    Plan: DC PLAN: Return home with family. PT/OT samia pending.    [facility] in the past.     Discharge Needs Assessment       Row Name 01/23/24 0907       Living Environment    People in Home child(jose), adult    Current Living Arrangements home    Potentially Unsafe Housing Conditions none    In the past 12 months has the electric, gas, oil, or water company threatened to shut off services in your home? No    Primary Care Provided by self    Provides Primary Care For no one    Family Caregiver if Needed spouse    Quality of Family Relationships helpful;involved;supportive    Able to Return to Prior Arrangements yes       Resource/Environmental Concerns    Resource/Environmental Concerns none    Transportation Concerns none       Transportation Needs    In the past 12 months, has lack of transportation kept you from medical appointments or from getting medications? no    In the past 12 months, has lack of transportation kept you from meetings, work, or from getting things needed for daily living? No       Food Insecurity    Within the past 12 months, you worried that your food would run out before you got the money to buy more. Never true    Within the past 12 months, the food you bought just didn't last and you didn't have money to get more. Never true       Transition Planning    Patient/Family Anticipates Transition to home with family    Patient/Family Anticipated Services at Transition none    Transportation Anticipated car, drives self;family or friend will provide       Discharge Needs Assessment    Readmission Within the Last 30 Days no previous admission in last 30 days    Equipment Currently Used at Home walker, standard    Anticipated Changes Related to Illness none    Equipment Needed After Discharge none                   Discharge Plan       Row Name 01/23/24 0907        Plan    Plan DC PLAN: Return home with family. PT/OT eval pending.    Patient/Family in Agreement with Plan yes    Plan Comments Met with patient at bedside, from routine home with adult daughter and son in law. Independent with ADL's only DME is a walker. PCP is Wilma, Pharmacy is [facility] in [place]. Able to afford medications and denies any concerns with food or utilities. Denies any transportation issues, still drives and son in law will transport at time of discharge. Denies any HHC or SNF needs. Has been to [facility] in the past. Denies any concerns about return home.     DC BARRIERS: Pending Echo, Hypotension, IVF, Hyponatremia.                    Continued Care and Services - Admitted Since 1/22/2024    Coordination has not been started for this encounter.       Selected Continued Care - Prior Encounters Includes continued care and service providers with selected services from prior encounters from 10/24/2023 to 1/23/2024      Discharged on 12/31/2023 Admission date: 12/29/2023 - Discharge disposition: Rehab Facility or Unit (DC - External)      Destination       Service Provider Selected Services Address Phone Fax Patient Preferred    [facility] [address] [place] [address] [phone] --                      Discharged on 12/6/2023 Admission date: 12/1/2023 - Discharge disposition: Skilled Nursing Facility (DC - External)      Destination       Service Provider Selected Services Address Phone Fax Patient Preferred    [facility] [address] [place] [address] [phone] --                             Demographic Summary       Row Name 01/23/24 0907       General Information    Admission Type observation    Arrived From emergency department    Required Notices Provided Observation Status Notice    Referral Source admission list    Reason for Consult discharge planning     Preferred Language English       Contact Information    Permission Granted to Share Info With     Contact Information Obtained for                    Functional Status       Row Name 01/23/24 0907       Functional Status    Usual Activity Tolerance excellent    Current Activity Tolerance excellent       Physical Activity    On average, how many days per week do you engage in moderate to strenuous exercise (like a brisk walk)? 0 days    On average, how many minutes do you engage in exercise at this level? 0 min    Number of minutes of exercise per week 0       Assessment of Health Literacy    How often do you have someone help you read hospital materials? Sometimes    How often do you have problems learning about your medical condition because of difficulty understanding written information? Sometimes    How often do you have a problem understanding what is told to you about your medical condition? Sometimes    How confident are you filling out medical forms by yourself? Somewhat    Health Literacy Moderate       Functional Status, IADL    Medications independent    Meal Preparation independent    Housekeeping independent    Laundry independent    Shopping independent       Mental Status    General Appearance WDL WDL       Mental Status Summary    Recent Changes in Mental Status/Cognitive Functioning no changes                       Patient Forms       Row Name 01/23/24 0838       Patient Forms    Important Message from Medicare (IMM) Delivered  WITT 1/22/24 per registration    Delivered to Patient    Method of delivery In person                    Met with patient in room wearing PPE:    Maintained distance greater than six feet and spent less than 15 minutes in the room.    Estela Felder RN   Case Management  [phone]

## 2024-01-23 NOTE — THERAPY EVALUATION
Patient Name: Marek Wu  : 1955    MRN: [id number]                              Today's Date: 2024       Admit Date: 2024    Visit Dx:     ICD-10-CM ICD-9-CM   1. Weakness  R53.1 780.79   2. Near syncope  R55 780.2     Patient Active Problem List   Diagnosis    Normal pressure hydrocephalus    Compression fracture of L2 lumbar vertebra    Syncope    Urinary incontinence     History reviewed. No pertinent past medical history.  History reviewed. No pertinent surgical history.   General Information       Row Name 24 171          OT Time and Intention    Document Type evaluation  -MP     Mode of Treatment occupational therapy  -       Row Name 24 171          General Information    Patient Profile Reviewed yes  -MP     Prior Level of Function independent:;ADL's  -MP     Existing Precautions/Restrictions fall  -       Row Name 24          Cognition    Orientation Status (Cognition) oriented x 4  -       Row Name 24          Safety Issues, Functional Mobility    Impairments Affecting Function (Mobility) balance;endurance/activity tolerance;strength  -MP               User Key  (r) = Recorded By, (t) = Taken By, (c) = Cosigned By      Initials Name Provider Type    MP Lul Catherine, PAN Occupational Therapist                     Mobility/ADL's       Row Name 24          Bed Mobility    Bed Mobility bed mobility (all) activities  -     All Activities, [place] (Bed Mobility) minimum assist (75% patient effort)  -     Assistive Device (Bed Mobility) head of bed elevated  -       Row Name 24          Sit-Stand Transfer    Sit-Stand [place] (Transfers) minimum assist (75% patient effort)  -       Row Name 24          Activities of Daily Living    BADL Assessment/Intervention lower body dressing;toileting  -       Row Name 24          Toileting Assessment/Training    [place] Level  (Toileting) toileting skills;minimum assist (75% patient effort)  -MP               User Key  (r) = Recorded By, (t) = Taken By, (c) = Cosigned By      Initials Name Provider Type    Lul Brown OT Occupational Therapist                   Obj/Interventions       Row Name 01/23/24 1714          Range of Motion Comprehensive    Comment, General Range of Motion BUE WFL  -MP       Row Name 01/23/24 1714          Strength Comprehensive (MMT)    Comment, General Manual Muscle Testing (MMT) Assessment BUE 4/5  -MP       Row Name 01/23/24 1714          Balance    Balance Interventions sitting;standing;sit to stand;static;dynamic;supported  -MP               User Key  (r) = Recorded By, (t) = Taken By, (c) = Cosigned By      Initials Name Provider Type    Lul Brown OT Occupational Therapist                   Goals/Plan       [facility] Name 01/23/24 1719          Bed Mobility Goal 1 (OT)    Activity/Assistive Device (Bed Mobility Goal 1, OT) bed mobility activities, all  -MP     [place] Level/Cues Needed (Bed Mobility Goal 1, OT) contact guard required  -MP     Time Frame (Bed Mobility Goal 1, OT) long term goal (LTG);2 weeks  -MP       Row Name 01/23/24 1719          Transfer Goal 1 (OT)    Activity/Assistive Device (Transfer Goal 1, OT) sit-to-stand/stand-to-sit;toilet  -MP     [place] Level/Cues Needed (Transfer Goal 1, OT) contact guard required  -MP     Time Frame (Transfer Goal 1, OT) long term goal (LTG);2 weeks  -MP       Row Name 01/23/24 1719          Dressing Goal 1 (OT)    Activity/Device (Dressing Goal 1, OT) lower body dressing  -MP     [place]/Cues Needed (Dressing Goal 1, OT) minimum assist (75% or more patient effort)  -MP     Time Frame (Dressing Goal 1, OT) long term goal (LTG);2 weeks  -MP               User Key  (r) = Recorded By, (t) = Taken By, (c) = Cosigned By      Initials Name Provider Type    Lul Brown OT Occupational Therapist                    Clinical Impression       Row Name 01/23/24 1715          Pain Assessment    Pretreatment Pain Rating 0/10 - no pain  -MP     Posttreatment Pain Rating 0/10 - no pain  -MP       Row Name 01/23/24 1715          Plan of Care Review    Plan of Care Reviewed With patient  -MP     Progress no change  -MP     Outcome Evaluation Pt is a 69 y/o M admitted to [facility] on 1/22/24 with complaints of worsening generalized weakness and difficulty with ambulation. He is also reporting dizziness with standing with near syncopal episode. Unable to achieve orthostatics due to severe dizziness in the ED. CT Head (-). Pt is currently living with sister and brother-in-law in multi-level home with two steps to enter. Pt. states he is ADL independent at baseline but with increased falls and difficulty maintaining independence leading up to hospitalization. Pt. completes all bed mobility and sit to stand transfer this date w/ min A for safety, decreased dynamic standing balance and support provided during standing toileting ADL. Pt. proivded max A for all LB ADLs. Pt. not safe to d/c home at this time and will require SNF placement to address aforementioned deficits.  -MP       Row Name 01/23/24 1715          Therapy Assessment/Plan (OT)    Rehab Potential (OT) good, to achieve stated therapy goals  -MP     Criteria for Skilled Therapeutic Interventions Met (OT) yes;meets criteria;skilled treatment is necessary  -MP     Therapy Frequency (OT) 3 times/wk  -MP       Row Name 01/23/24 1715          Therapy Plan Review/Discharge Plan (OT)    Anticipated Discharge Disposition (OT) skilled nursing facility  -MP       Row Name 01/23/24 1715          Vital Signs    Pre Patient Position Supine  -MP     Intra Patient Position Standing  -MP     Post Patient Position Supine  -MP       Row Name 01/23/24 1715          Positioning and Restraints    Pre-Treatment Position in bed  -MP     Post Treatment Position bed  -MP     In Bed supine;call light within  reach;encouraged to call for assist;exit alarm on  -MP               User Key  (r) = Recorded By, (t) = Taken By, (c) = Cosigned By      Initials Name Provider Type    Lul Brown OT Occupational Therapist                   Outcome Measures       Row Name 01/23/24 1326 01/23/24 0825       How much help from another person do you currently need...    Turning from your back to your side while in flat bed without using bedrails? 3  -MB 3  -TP    Moving from lying on back to sitting on the side of a flat bed without bedrails? 3  -MB 3  -TP    Moving to and from a bed to a chair (including a wheelchair)? 3  -MB 3  -TP    Standing up from a chair using your arms (e.g., wheelchair, bedside chair)? 3  -MB 3  -TP    Climbing 3-5 steps with a railing? 2  -MB 3  -TP    To walk in hospital room? 2  -MB 3  -TP    AM-PAC 6 Clicks Score (PT) 16  -MB 18  -TP    Highest Level of Mobility Goal 5 --> Static standing  -MB 6 --> Walk 10 steps or more  -TP              User Key  (r) = Recorded By, (t) = Taken By, (c) = Cosigned By      Initials Name Provider Type    Myesha Lara LPN Licensed Nurse    Derek Huertas, PT Physical Therapist                    Occupational Therapy Education       Title: PT OT SLP Therapies (In Progress)       Topic: Occupational Therapy (In Progress)       Point: ADL training (Not Started)       Description:   Instruct learner(s) on proper safety adaptation and remediation techniques during self care or transfers.   Instruct in proper use of assistive devices.                  Learner Progress:  Not documented in this visit.              Point: Home exercise program (Not Started)       Description:   Instruct learner(s) on appropriate technique for monitoring, assisting and/or progressing therapeutic exercises/activities.                  Learner Progress:  Not documented in this visit.              Point: Precautions (Not Started)       Description:   Instruct learner(s) on prescribed  precautions during self-care and functional transfers.                  Learner Progress:  Not documented in this visit.              Point: Body mechanics (Done)       Description:   Instruct learner(s) on proper positioning and spine alignment during self-care, functional mobility activities and/or exercises.                  Learning Progress Summary             Patient Acceptance, E,TB, VU by  at 1/23/2024 9686                                         User Key       Initials Effective Dates Name Provider Type Discipline     06/16/21 -  Lul Catherine OT Occupational Therapist OT                  OT Recommendation and Plan  Therapy Frequency (OT): 3 times/wk  Plan of Care Review  Plan of Care Reviewed With: patient  Progress: no change  Outcome Evaluation: Pt is a 69 y/o M admitted to [facility] on 1/22/24 with complaints of worsening generalized weakness and difficulty with ambulation. He is also reporting dizziness with standing with near syncopal episode. Unable to achieve orthostatics due to severe dizziness in the ED. CT Head (-). Pt is currently living with sister and brother-in-law in multi-level home with two steps to enter. Pt. states he is ADL independent at baseline but with increased falls and difficulty maintaining independence leading up to hospitalization. Pt. completes all bed mobility and sit to stand transfer this date w/ min A for safety, decreased dynamic standing balance and support provided during standing toileting ADL. Pt. proivded max A for all LB ADLs. Pt. not safe to d/c home at this time and will require SNF placement to address aforementioned deficits.     Time Calculation:         Time Calculation- OT       Row Name 01/23/24 1719             Time Calculation- OT    OT Start Time 1515  -      OT Stop Time 1537  -      OT Time Calculation (min) 22 min  -      Total Timed Code Minutes- OT 0 minute(s)  -ADRIANE      OT Received On 01/23/24  -ADRIANE      OT - Next Appointment 01/25/24  -ADRIANE       OT Goal Re-Cert Due Date 02/06/24  -ADRIANE                User Key  (r) = Recorded By, (t) = Taken By, (c) = Cosigned By      Initials Name Provider Type    Lul Brown OT Occupational Therapist                  Therapy Charges for Today       Code Description Service Date Service Provider Modifiers Qty    [id number] HC OT EVAL LOW COMPLEXITY 4 1/23/2024 Lul Catherine OT GO 1                 Lul Catherine OT  1/23/2024

## 2024-01-23 NOTE — THERAPY EVALUATION
Patient Name: Marek Wu  : 1955    MRN: [id number]                              Today's Date: 2024       Admit Date: 2024    Visit Dx:     ICD-10-CM ICD-9-CM   1. Weakness  R53.1 780.79   2. Near syncope  R55 780.2     Patient Active Problem List   Diagnosis    Normal pressure hydrocephalus    Compression fracture of L2 lumbar vertebra    Syncope    Urinary incontinence     No past medical history on file.  No past surgical history on file.   General Information       Row Name 24 1320          Physical Therapy Time and Intention    Document Type evaluation  -MB     Mode of Treatment physical therapy  -MB       Row Name 24 1320          General Information    Patient Profile Reviewed yes  -MB     Prior Level of Function independent:;all household mobility;community mobility;gait;transfer;ADL's;home management;using stairs  -MB     Existing Precautions/Restrictions fall  -MB     Barriers to Rehab medically complex  -MB       Row Name 24 1320          Living Environment    People in Home child(jose), adult  -MB       Row Name 24 1320          Home Main Entrance    Number of Stairs, Main Entrance two  -MB     Stair Railings, Main Entrance railings safe and in good condition  -MB       Row Name 24 1320          Stairs Within Home, Primary    Stairs, Within Home, Primary flight to upstairs bedroom  -MB     Stair Railings, Within Home, Primary railings safe and in good condition  -MB       Row Name 24 1320          Cognition    Orientation Status (Cognition) oriented x 4  -MB       Row Name 24 1320          Safety Issues, Functional Mobility    Impairments Affecting Function (Mobility) balance;endurance/activity tolerance;strength  -MB               User Key  (r) = Recorded By, (t) = Taken By, (c) = Cosigned By      Initials Name Provider Type    Derek Huertas PT Physical Therapist                   Mobility       Row Name 24 1322          Bed  Mobility    Bed Mobility bed mobility (all) activities  -MB     All Activities, Gilmer (Bed Mobility) minimum assist (75% patient effort)  -MB     Assistive Device (Bed Mobility) head of bed elevated  -MB       Row Name 01/23/24 1322          Bed-Chair Transfer    Bed-Chair Gilmer (Transfers) minimum assist (75% patient effort)  -MB     Comment, (Bed-Chair Transfer) no AD  -MB       Row Name 01/23/24 1322          Sit-Stand Transfer    Sit-Stand Gilmer (Transfers) minimum assist (75% patient effort)  -MB     Comment, (Sit-Stand Transfer) no AD  -MB       Row Name 01/23/24 1322          Gait/Stairs (Locomotion)    Gilmer Level (Gait) minimum assist (75% patient effort)  -MB     Patient was able to Ambulate yes  -MB     Distance in Feet (Gait) 5  -MB     Deviations/Abnormal Patterns (Gait) gait speed decreased;saige decreased;stride length decreased;weight shifting decreased  -MB     Comment, (Gait/Stairs) 5' min A with no AD  -MB               User Key  (r) = Recorded By, (t) = Taken By, (c) = Cosigned By      Initials Name Provider Type    Derek Huertas, PT Physical Therapist                   Obj/Interventions       Row Name 01/23/24 1323          Range of Motion Comprehensive    General Range of Motion no range of motion deficits identified  -MB       Row Name 01/23/24 1323          Strength Comprehensive (MMT)    Comment, General Manual Muscle Testing (MMT) Assessment BLE Strength 3/5 grossly  -MB       Row Name 01/23/24 1323          Balance    Balance Assessment sitting static balance;sitting dynamic balance;sit to stand dynamic balance;standing dynamic balance;standing static balance  -MB     Static Sitting Balance standby assist  -MB     Dynamic Sitting Balance contact guard  -MB     Position, Sitting Balance unsupported;sitting edge of bed  -MB     Sit to Stand Dynamic Balance minimal assist  -MB     Static Standing Balance contact guard  -MB     Dynamic Standing Balance moderate  assist  -MB       Row Name 01/23/24 1323          Sensory Assessment (Somatosensory)    Sensory Assessment (Somatosensory) sensation intact  -MB               User Key  (r) = Recorded By, (t) = Taken By, (c) = Cosigned By      Initials Name Provider Type    Derek Huertas, PT Physical Therapist                   Goals/Plan       Row Name 01/23/24 1324          Bed Mobility Goal 1 (PT)    Activity/Assistive Device (Bed Mobility Goal 1, PT) bed mobility activities, all  -MB     Elaine Level/Cues Needed (Bed Mobility Goal 1, PT) standby assist  -MB     Time Frame (Bed Mobility Goal 1, PT) long term goal (LTG);2 weeks  -MB       Row Name 01/23/24 1324          Transfer Goal 1 (PT)    Activity/Assistive Device (Transfer Goal 1, PT) transfers, all;walker, rolling  -MB     Elaine Level/Cues Needed (Transfer Goal 1, PT) contact guard required  -MB     Time Frame (Transfer Goal 1, PT) long term goal (LTG);2 weeks  -MB       Row Name 01/23/24 1324          Gait Training Goal 1 (PT)    Activity/Assistive Device (Gait Training Goal 1, PT) gait (walking locomotion);walker, rolling  -MB     Elaine Level (Gait Training Goal 1, PT) contact guard required  -MB     Distance (Gait Training Goal 1, PT) 100  -MB     Time Frame (Gait Training Goal 1, PT) long term goal (LTG);2 weeks  -MB       Row Name 01/23/24 1324          Therapy Assessment/Plan (PT)    Planned Therapy Interventions (PT) balance training;bed mobility training;gait training;home exercise program;neuromuscular re-education;patient/family education;strengthening;stair training;transfer training  -MB               User Key  (r) = Recorded By, (t) = Taken By, (c) = Cosigned By      Initials Name Provider Type    Derek Huertas, PT Physical Therapist                   Clinical Impression       Row Name 01/23/24 1324          Pain    Pretreatment Pain Rating 0/10 - no pain  -MB     Posttreatment Pain Rating 0/10 - no pain  -MB       Row Name 01/23/24 1338  01/23/24 1324       Plan of Care Review    Plan of Care Reviewed With -- patient  -MB    Progress -- no change  -MB    Outcome Evaluation Pt is a 67 y/o M admitted to [facility] on 1/22/24 with complaints of worsening generalized weakness and difficulty with ambulation. He is also reporting dizziness with standing with near syncopal episode. Unable to achieve orthostatics due to severe dizziness in the ED. CT Head (-). Pt is currently living with sister and brother-in-law in multi-level home with two steps to enter. Flight to upstairs bedroom. At baseline, he reports he is normally IND with household mobility and ADLs with use of RW. Does not cook or do his own laundry. Was recently d/c from [facility] with generalized weakness. This date, he is AAOx4 and lying supine in bed with no complaints of pain. He completes bed mobility min A, transfers, min A, and amb 5' min A with no AD this session. He was bladder incontinent to begin the session, req extended time to change linens and complete pericare. When assessing mobility, he is very unsteady on his feet and only able to take a few steps before requesting seated break. He was hypertensive throughout, but (-) with orthostatics. He is grossly weak at this time and seems far below his IND baseline at home. He also seems to be unable to care for himself at this time. PT is recommending SNF at d/c for continued care and therapy until his current condition improves. PT will follow during stay.  -MB --      Row Name 01/23/24 1324          Therapy Assessment/Plan (PT)    Rehab Potential (PT) fair, will monitor progress closely  -MB     Criteria for Skilled Interventions Met (PT) yes;meets criteria  -MB     Therapy Frequency (PT) 3 times/wk  -MB     Predicted Duration of Therapy Intervention (PT) until d/c  -MB       Row Name 01/23/24 1324          Vital Signs    Pre Systolic BP Rehab 144  -MB     Pre Treatment Diastolic BP 54  -MB     Intra Systolic BP Rehab 158  -MB     Intra Treatment  Diastolic BP 64  -MB     Post Systolic BP Rehab 167  -MB     Post Treatment Diastolic BP 62  -MB     O2 Delivery Pre Treatment room air  -MB     O2 Delivery Intra Treatment room air  -MB     O2 Delivery Post Treatment room air  -MB     Pre Patient Position Supine  -MB     Intra Patient Position Standing  -MB     Post Patient Position Supine  -MB       Row Name 01/23/24 1324          Positioning and Restraints    Pre-Treatment Position in bed  -MB     Post Treatment Position bed  -MB     In Bed notified nsg;supine;call light within reach;encouraged to call for assist  -MB               User Key  (r) = Recorded By, (t) = Taken By, (c) = Cosigned By      Initials Name Provider Type    Derek Huertas, PT Physical Therapist                   Outcome Measures       Row Name 01/23/24 1326 01/23/24 0825       How much help from another person do you currently need...    Turning from your back to your side while in flat bed without using bedrails? 3  -MB 3  -TP    Moving from lying on back to sitting on the side of a flat bed without bedrails? 3  -MB 3  -TP    Moving to and from a bed to a chair (including a wheelchair)? 3  -MB 3  -TP    Standing up from a chair using your arms (e.g., wheelchair, bedside chair)? 3  -MB 3  -TP    Climbing 3-5 steps with a railing? 2  -MB 3  -TP    To walk in hospital room? 2  -MB 3  -TP    AM-PAC 6 Clicks Score (PT) 16  -MB 18  -TP    Highest Level of Mobility Goal 5 --> Static standing  -MB 6 --> Walk 10 steps or more  -TP              User Key  (r) = Recorded By, (t) = Taken By, (c) = Cosigned By      Initials Name Provider Type    Myesha Lara LPN Licensed Nurse    Derek Huertas, PT Physical Therapist                                 Physical Therapy Education       Title: PT OT SLP Therapies (Done)       Topic: Physical Therapy (Done)       Point: Mobility training (Done)       Learning Progress Summary             Patient Acceptance, E,TB, VU by MB at 1/23/2024 1326                          Point: Body mechanics (Done)       Learning Progress Summary             Patient Acceptance, E,TB, VU by MB at 1/23/2024 1326                         Point: Precautions (Done)       Learning Progress Summary             Patient Acceptance, E,TB, VU by MB at 1/23/2024 1326                                         User Key       Initials Effective Dates Name Provider Type Discipline    MB 06/06/23 -  Derek Elder, PT Physical Therapist PT                  PT Recommendation and Plan  Planned Therapy Interventions (PT): balance training, bed mobility training, gait training, home exercise program, neuromuscular re-education, patient/family education, strengthening, stair training, transfer training  Plan of Care Reviewed With: patient  Progress: no change  Outcome Evaluation: Pt is a 69 y/o M admitted to [facility] on 1/22/24 with complaints of worsening generalized weakness and difficulty with ambulation. He is also reporting dizziness with standing with near syncopal episode. Unable to achieve orthostatics due to severe dizziness in the ED. CT Head (-). Pt is currently living with sister and brother-in-law in multi-level home with two steps to enter. Flight to upstairs bedroom. At baseline, he reports he is normally IND with household mobility and ADLs with use of RW. Does not cook or do his own laundry. Was recently d/c from [facility] with generalized weakness. This date, he is AAOx4 and lying supine in bed with no complaints of pain. He completes bed mobility min A, transfers, min A, and amb 5' min A with no AD this session. He was bladder incontinent to begin the session, req extended time to change linens and complete pericare. When assessing mobility, he is very unsteady on his feet and only able to take a few steps before requesting seated break. He was hypertensive throughout, but (-) with orthostatics. He is grossly weak at this time and seems far below his IND baseline at home. He also seems to be unable to  care for himself at this time. PT is recommending SNF at d/c for continued care and therapy until his current condition improves. PT will follow during stay.     Time Calculation:   PT Evaluation Complexity  History, PT Evaluation Complexity: 1-2 personal factors and/or comorbidities  Examination of Body Systems (PT Eval Complexity): total of 3 or more elements  Clinical Presentation (PT Evaluation Complexity): evolving  Clinical Decision Making (PT Evaluation Complexity): moderate complexity  Overall Complexity (PT Evaluation Complexity): moderate complexity     PT Charges       Row Name 01/23/24 1326             Time Calculation    Start Time 1059  -MB      Stop Time 1135  -MB      Time Calculation (min) 36 min  -MB      PT Received On 01/23/24  -MB      PT - Next Appointment 01/25/24  -MB      PT Goal Re-Cert Due Date 02/06/24  -MB         Time Calculation- PT    Total Timed Code Minutes- PT 0 minute(s)  -MB                User Key  (r) = Recorded By, (t) = Taken By, (c) = Cosigned By      Initials Name Provider Type    Derek Huertas, PT Physical Therapist                  Therapy Charges for Today       Code Description Service Date Service Provider Modifiers Qty    72724861923 HC PT EVAL MOD COMPLEXITY 4 1/23/2024 Derek Elder, PT GP 1            PT G-Codes  AM-PAC 6 Clicks Score (PT): 16  PT Discharge Summary  Anticipated Discharge Disposition (PT): skilled nursing facility    Derek Elder PT  1/23/2024

## 2024-01-23 NOTE — PLAN OF CARE
Goal Outcome Evaluation:  Plan of Care Reviewed With: patient        Progress: no change  Outcome Evaluation: Pt is a 67 y/o M admitted to [facility] on 1/22/24 with complaints of worsening generalized weakness and difficulty with ambulation. He is also reporting dizziness with standing with near syncopal episode. Unable to achieve orthostatics due to severe dizziness in the ED. CT Head (-). Pt is currently living with sister and brother-in-law in multi-level home with two steps to enter. Pt. states he is ADL independent at baseline but with increased falls and difficulty maintaining independence leading up to hospitalization. Pt. completes all bed mobility and sit to stand transfer this date w/ min A for safety, decreased dynamic standing balance and support provided during standing toileting ADL. Pt. proivded max A for all LB ADLs. Pt. not safe to d/c home at this time and will require SNF placement to address aforementioned deficits.      Anticipated Discharge Disposition (OT): skilled nursing facility

## 2024-01-23 NOTE — PLAN OF CARE
Goal Outcome Evaluation:  Plan of Care Reviewed With: patient        Progress: no change   Pt is a 67 y/o M admitted to [facility] on 1/22/24 with complaints of worsening generalized weakness and difficulty with ambulation. He is also reporting dizziness with standing with near syncopal episode. Unable to achieve orthostatics due to severe dizziness in the ED. CT Head (-). Pt is currently living with sister and brother-in-law in multi-level home with two steps to enter. Flight to upstairs bedroom. At baseline, he reports he is normally IND with household mobility and ADLs with use of RW. Does not cook or do his own laundry. Was recently d/c from [facility] with generalized weakness. This date, he is AAOx4 and lying supine in bed with no complaints of pain. He completes bed mobility min A, transfers, min A, and amb 5' min A with no AD this session. He was bladder incontinent to begin the session, req extended time to change linens and complete pericare. When assessing mobility, he is very unsteady on his feet and only able to take a few steps before requesting seated break. He was hypertensive throughout, but (-) with orthostatics. He is grossly weak at this time and seems far below his IND baseline at home. He also seems to be unable to care for himself at this time. PT is recommending SNF at d/c for continued care and therapy until his current condition improves. PT will follow during stay.    Anticipated Discharge Disposition (PT): skilled nursing facility

## 2024-01-23 NOTE — PROGRESS NOTES
[facility]   HISTORY AND PHYSICAL     Patient Name: Marek Wu  : 1955  MRN: [id number]  Primary Care Physician:  Jeff Jacques MD  Date of admission: 2024  Service Date and time: 24 16:20 EST     Subjective   Subjective      Chief Complaint: near syncope     HPI:     Marek Wu is a 68 y.o. male presents to ER after near syncope episode earlier today. He states he does not remember what happened except that he went to Urologist appointment today and has been getting weaker and weaker everyday. He has difficulty with ambulation and gets dizzy upon standing. Urology told him to D/C his oxybutynin and desmopression today. He has had issues with hyponatremia but his Na today is 140. Patient states he feels real weak and has urinary incontinence. They tried to complete orthostatics in the ER but were unable to 2/2 him being unsteady on his feet and dizzy.   He denies chest pain, SOB, abdominal pain, N/V, chills/fever, constipation/diarrhea, cough, flank pain, or blurry vision.      Review of Systems              As per HPI      Admitted yesterday with generalized weakness and dizziness and near syncope  He denied any real syncope  Patient with UTI on IV antibiotic with Rocephin awaiting urine culture so far is growing gram-negative  Feeling better today  Will get PT evaluation  Sodium is improved  Anticipate discharge planning Home tomorrow     Personal History      Medical History   No past medical history on file.        Surgical History   No past surgical history on file.        Family History: family history is not on file. Otherwise pertinent FHx was reviewed and not pertinent to current issue.     Social History:  reports that he has quit smoking. His smoking use included cigarettes. He has never used smokeless tobacco. He reports that he does not currently use alcohol. He reports that he does not currently use drugs.     Home Medications:  DULoxetine,  acetaminophen, carBAMazepine XR, cetaphil, desmopressin, fexofenadine, hydrALAZINE, levothyroxine, lidocaine, montelukast, and sodium bicarbonate        Allergies:  No Known Allergies           Objective   Objective      Vitals:   Temp:  [98.4 °F (36.9 °C)] 98.4 °F (36.9 °C)  Heart Rate:  [75-93] 75  Resp:  [20-25] 25  BP: (122-163)/(77-92) 156/87  Physical Exam                         Constitutional: Awake, alert              Eyes: PERRLA, sclerae anicteric, no conjunctival injection              HENT: NCAT, mucous membranes moist              Neck: Supple, no thyromegaly, no lymphadenopathy, trachea midline              Respiratory: Clear to auscultation bilaterally, nonlabored respirations               Cardiovascular: RRR, no murmurs, rubs, or gallops, palpable pedal pulses bilaterally              Gastrointestinal: Positive bowel sounds, soft, nontender, nondistended              Musculoskeletal: No bilateral ankle edema, no clubbing or cyanosis to extremities              Psychiatric: Appropriate affect, cooperative              Neurologic: Oriented x 3, strength symmetric in all extremities, Cranial Nerves grossly intact to confrontation, slowed mentation              Skin: No rashes            Result Review   Result Review:  I have personally reviewed the results from the time of this admission to 1/22/2024 16:20 EST and agree with these findings:  [x]  Laboratory list / accordion  [x]  Microbiology  [x]  Radiology  [x]  EKG/Telemetry   [x]  Cardiology/Vascular   []  Pathology  []  Old records  []  Other:              Assessment & Plan  Assessment / Plan         Active Hospital Problems:       Active Hospital Problems     Diagnosis      **Syncope      Urinary incontinence      Compression fracture of L2 lumbar vertebra        Plan:   - possibly 2/2 orthostatic hypotension, recheck orthostatics when patient able to tolerate, currently gets dizzy upon standing, telemetry  - obtain echo, neurochecks  - IVF  -  imaging reviewed  - PT/OT  - trend labs, replace electrolytes     DVT prophylaxis:  Medical DVT prophylaxis orders are present.     CODE STATUS:    Code Status (Patient has no pulse and is not breathing): CPR (Attempt to Resuscitate)  Medical Interventions (Patient has pulse or is breathing): Full Support     Admission Status:  I believe this patient meets observation status.

## 2024-01-24 LAB — BACTERIA SPEC AEROBE CULT: ABNORMAL

## 2024-01-24 PROCEDURE — 83540 ASSAY OF IRON: CPT | Performed by: FAMILY MEDICINE

## 2024-01-24 PROCEDURE — 25010000002 CEFTRIAXONE PER 250 MG: Performed by: FAMILY MEDICINE

## 2024-01-24 PROCEDURE — 25010000002 ENOXAPARIN PER 10 MG: Performed by: FAMILY MEDICINE

## 2024-01-24 PROCEDURE — 84466 ASSAY OF TRANSFERRIN: CPT | Performed by: FAMILY MEDICINE

## 2024-01-24 RX ORDER — TAMSULOSIN HYDROCHLORIDE 0.4 MG/1
0.4 CAPSULE ORAL DAILY
Status: DISCONTINUED | OUTPATIENT
Start: 2024-01-24 | End: 2024-01-26 | Stop reason: HOSPADM

## 2024-01-24 RX ORDER — OXYBUTYNIN CHLORIDE 5 MG/1
5 TABLET ORAL NIGHTLY
Status: ON HOLD | COMMUNITY
End: 2024-01-24

## 2024-01-24 RX ORDER — FAMOTIDINE 20 MG/1
20 TABLET, FILM COATED ORAL
COMMUNITY

## 2024-01-24 RX ORDER — VIBEGRON 75 MG/1
75 TABLET, FILM COATED ORAL DAILY
COMMUNITY

## 2024-01-24 RX ORDER — DULOXETIN HYDROCHLORIDE 60 MG/1
60 CAPSULE, DELAYED RELEASE ORAL DAILY
COMMUNITY

## 2024-01-24 RX ORDER — TAMSULOSIN HYDROCHLORIDE 0.4 MG/1
1 CAPSULE ORAL NIGHTLY
COMMUNITY

## 2024-01-24 RX ADMIN — SODIUM BICARBONATE 650 MG: 650 TABLET ORAL at 09:12

## 2024-01-24 RX ADMIN — HYDRALAZINE HYDROCHLORIDE 100 MG: 25 TABLET ORAL at 09:12

## 2024-01-24 RX ADMIN — TAMSULOSIN HYDROCHLORIDE 0.4 MG: 0.4 CAPSULE ORAL at 10:46

## 2024-01-24 RX ADMIN — DEMECLOCYCLINE HYDROCHLORIDE 300 MG: 150 TABLET, FILM COATED ORAL at 23:28

## 2024-01-24 RX ADMIN — Medication 5 MG: at 20:17

## 2024-01-24 RX ADMIN — Medication 10 ML: at 23:29

## 2024-01-24 RX ADMIN — CARBAMAZEPINE 200 MG: 200 TABLET ORAL at 23:28

## 2024-01-24 RX ADMIN — CETIRIZINE HYDROCHLORIDE 10 MG: 10 TABLET, FILM COATED ORAL at 09:12

## 2024-01-24 RX ADMIN — HYDRALAZINE HYDROCHLORIDE 100 MG: 25 TABLET ORAL at 23:28

## 2024-01-24 RX ADMIN — CEFTRIAXONE 2000 MG: 2 INJECTION, POWDER, FOR SOLUTION INTRAMUSCULAR; INTRAVENOUS at 17:38

## 2024-01-24 RX ADMIN — ENOXAPARIN SODIUM 40 MG: 100 INJECTION SUBCUTANEOUS at 17:38

## 2024-01-24 RX ADMIN — MONTELUKAST 10 MG: 10 TABLET, FILM COATED ORAL at 23:29

## 2024-01-24 RX ADMIN — HYDRALAZINE HYDROCHLORIDE 100 MG: 25 TABLET ORAL at 17:38

## 2024-01-24 RX ADMIN — DEMECLOCYCLINE HYDROCHLORIDE 300 MG: 150 TABLET, FILM COATED ORAL at 09:12

## 2024-01-24 RX ADMIN — DULOXETINE HYDROCHLORIDE 30 MG: 30 CAPSULE, DELAYED RELEASE ORAL at 09:19

## 2024-01-24 RX ADMIN — CARBAMAZEPINE 200 MG: 200 TABLET ORAL at 09:12

## 2024-01-24 NOTE — DISCHARGE PLACEMENT REQUEST
"Tresa Thomas (68 y.o. Male)       Date of Birth   1955    Social Security Number       Address   [address] [place] [address] [address] [facility] [address] [place] [facility]    Home Phone   [phone]    MRN   [id number]       Church   None    Marital Status   Single                            Admission Date   1/22/24    Admission Type   Emergency    Admitting Provider   Mumtaz Goldman MD    Attending Provider   Danuta Dillon MD    Department, Room/Bed   [facility] 3C MEDICAL INPATIENT, 375/1       Discharge Date       Discharge Disposition       Discharge Destination                                 Attending Provider: Danuta Dillon MD    Allergies: No Known Allergies    Isolation: None   Infection: None   Code Status: CPR    Ht: 195.6 cm (77\")   Wt: 118 kg (261 lb)    Admission Cmt: None   Principal Problem: Syncope [R55]                   Active Insurance as of 1/22/2024       Primary Coverage       Payor Plan Insurance Group Employer/Plan Group    MEDICARE MEDICARE A & B        Payor Plan Address Payor Plan Phone Number Payor Plan Fax Number Effective Dates    [address] [phone]  8/1/2013 - None Entered    [facility] [id number]         Subscriber Name Subscriber Birth Date Member ID       TRESA THOMAS 1955 [id number]               Secondary Coverage       Payor Plan Insurance Group Employer/Plan Group    MISC COMMERCIAL MISC COMMERCIAL        Coverage Address Coverage Phone Number Coverage Fax Number Effective Dates    [address] [phone]  12/1/2023 - None Entered    [facility] [address]         Subscriber Name Subscriber Birth Date Member ID       TRESA THOMAS 1955 [id number]                     Emergency Contacts        (Rel.) Home Phone Work Phone Mobile Phone    MIEKJUAN (Sister) [phone] -- [phone]    SETH MCKEON (Brother) [phone] -- --          "

## 2024-01-24 NOTE — PLAN OF CARE
Goal Outcome Evaluation:      Pt rested well overnight with no new complaints.

## 2024-01-24 NOTE — CASE MANAGEMENT/SOCIAL WORK
Continued Stay Note  [facility]     Patient Name: Marek Wu  MRN: [id number]  Today's Date: 1/24/2024    Admit Date: 1/22/2024    Plan: Referral to [facility] pending. No precert required. Will need PASRR.   Discharge Plan       Row Name 01/24/24 1329       Plan    Plan Referral to [facility] pending. No precert required. Will need PASRR.    Patient/Family in Agreement with Plan yes    Provided Post Acute Provider List? Yes    Post Acute Provider List Nursing Home    Delivered To Patient    Method of Delivery In person    Plan Comments CM met with patient at bedside. Reviewed therapy's recommendation of SNF placement. Pt was recently hospitalized 12/29/2023 - 12/31/2023 and discharged to [facility]. Confirmed with [facility] luzmaria Win that pt was at [facility] 12/31-1/10 and would have had his 3 midnight stay. Pt did not want to return to [facility]. Reviewed other facilities in [place] and pt agreeable to referral to [facility]. Referral sent in [facility] basket and luzmaria Becerra notified, pending review. Pt interested in facilities in [facility] Will provide list with those options. Also provided IMM letter and obtained signature.        Megan Naegele, RN     Office Phone: [phone]  Office Cell: [phone]

## 2024-01-24 NOTE — PROGRESS NOTES
LOS: 0 days   Patient Care Team:  Danuta Dillon MD as PCP - General (Family Medicine)        Subjective   Lying on bed    Interval History: None    Patient Complaints: Feeling weak    Review of Systems   Constitutional:  Positive for activity change and fatigue.   Eyes: Negative.    Respiratory: Negative.     Cardiovascular: Negative.    Gastrointestinal:  Positive for blood in stool.   Endocrine: Negative.    Genitourinary:  Positive for frequency.   Musculoskeletal:  Positive for gait problem.   Neurological:  Positive for dizziness, weakness and light-headedness.   Psychiatric/Behavioral:  The patient is nervous/anxious.         Objective     Vital Signs  Temp:  [97.4 °F (36.3 °C)-97.8 °F (36.6 °C)] 97.7 °F (36.5 °C)  Heart Rate:  [62-80] 63  Resp:  [14-18] 15  BP: (143-161)/(75-90) 161/76    Physical Exam:     General Appearance:    Alert, cooperative, in no acute distress   Ears:    Ears appear intact with no abnormalities noted   Throat:   No oral lesions, no thrush, oral mucosa moist   Neck:   No adenopathy, supple, trachea midline, no thyromegaly, no   carotid bruit, no JVD   Lungs:     Clear to auscultation, respirations regular, even and               Unlabored     Heart:    Regular rhythm and normal rate, normal S1 and S2, no          murmur, no gallop, no rub, no click   Abdomen:     Normal bowel sounds, no masses, no organomegaly, soft      nontender, nondistended, no guarding, no rebound                tenderness   Extremities:   Moves all extremities well, no edema, no cyanosis, no           redness   Skin:   No bleeding, bruising or rash   Neurologic:   Cranial nerves 2 - 12 grossly intact, sensation intact, DTR       present and equal bilaterally        Results Review:    Lab Results (last 24 hours)       Procedure Component Value Units Date/Time    Lactic Acid, Plasma [[id number]]  (Normal) Collected: 01/23/24 1616    Specimen: Blood Updated: 01/23/24 1702     Lactate 1.4 mmol/L      Blood Culture - Blood, Arm, Left [[id number]] Collected: 01/23/24 1615    Specimen: Blood from Arm, Left Updated: 01/23/24 1639    Blood Culture - Blood, Hand, Left [[id number]] Collected: 01/23/24 1616    Specimen: Blood from Hand, Left Updated: 01/23/24 1638    Blood Culture - Blood, Arm, Right [[id number]] Collected: 01/23/24 1024    Specimen: Blood from Arm, Right Updated: 01/23/24 1036    Urine Culture - Urine, Urine, Clean Catch [[id number]]  (Abnormal) Collected: 01/22/24 1607    Specimen: Urine, Clean Catch Updated: 01/23/24 0937     Urine Culture >100,000 CFU/mL Gram Negative Bacilli    Narrative:      Colonization of the urinary tract without infection is common. Treatment is discouraged unless the patient is symptomatic, pregnant, or undergoing an invasive urologic procedure.    Basic Metabolic Panel [[id number]]  (Normal) Collected: 01/23/24 0458    Specimen: Blood Updated: 01/23/24 0531     Glucose 90 mg/dL      BUN 15 mg/dL      Creatinine 0.89 mg/dL      Sodium 142 mmol/L      Potassium 3.7 mmol/L      Chloride 107 mmol/L      CO2 25.0 mmol/L      Calcium 8.8 mg/dL      BUN/Creatinine Ratio 16.9     Anion Gap 10.0 mmol/L      eGFR 93.9 mL/min/1.73     Narrative:      GFR Normal >60  Chronic Kidney Disease <60  Kidney Failure <15      Magnesium [[id number]]  (Normal) Collected: 01/23/24 0458    Specimen: Blood Updated: 01/23/24 0531     Magnesium 2.0 mg/dL     Lipid Panel [[id number]]  (Abnormal) Collected: 01/23/24 0458    Specimen: Blood Updated: 01/23/24 0531     Total Cholesterol 123 mg/dL      Triglycerides 92 mg/dL      HDL Cholesterol 27 mg/dL      LDL Cholesterol  78 mg/dL      VLDL Cholesterol 18 mg/dL      LDL/HDL Ratio 2.87    Narrative:      Cholesterol Reference Ranges  (U.S. Department of Health and Human Services ATP III Classifications)    Desirable          <200 mg/dL  Borderline High    200-239 mg/dL  High Risk          >240 mg/dL      Triglyceride Reference Ranges  (U.S. Department of  Health and Human Services ATP III Classifications)    Normal           <150 mg/dL  Borderline High  150-199 mg/dL  High             200-499 mg/dL  Very High        >500 mg/dL    HDL Reference Ranges  (U.S. Department of Health and Human Services ATP III Classifications)    Low     <40 mg/dl (major risk factor for CHD)  High    >60 mg/dl ('negative' risk factor for CHD)        LDL Reference Ranges  (U.S. Department of Health and Human Services ATP III Classifications)    Optimal          <100 mg/dL  Near Optimal     100-129 mg/dL  Borderline High  130-159 mg/dL  High             160-189 mg/dL  Very High        >189 mg/dL    CBC Auto Differential [[id number]]  (Abnormal) Collected: 01/23/24 0458    Specimen: Blood Updated: 01/23/24 0504     WBC 5.20 10*3/mm3      RBC 4.72 10*6/mm3      Hemoglobin 12.0 g/dL      Hematocrit 36.8 %      MCV 78.0 fL      MCH 25.4 pg      MCHC 32.6 g/dL      RDW 16.4 %      RDW-SD 46.8 fl      MPV 6.9 fL      Platelets 427 10*3/mm3      Neutrophil % 46.2 %      Lymphocyte % 34.5 %      Monocyte % 12.0 %      Eosinophil % 6.1 %      Basophil % 1.2 %      Neutrophils, Absolute 2.40 10*3/mm3      Lymphocytes, Absolute 1.80 10*3/mm3      Monocytes, Absolute 0.60 10*3/mm3      Eosinophils, Absolute 0.30 10*3/mm3      Basophils, Absolute 0.10 10*3/mm3      nRBC 0.1 /100 WBC            Imaging Results (Last 24 Hours)       ** No results found for the last 24 hours. **             I reviewed the patient's other test results and agree with the interpretation    Medication Review:     Current Facility-Administered Medications:     acetaminophen (TYLENOL) tablet 650 mg, 650 mg, Oral, Q4H PRN, Danuta Dillon MD    sennosides-docusate (PERICOLACE) 8.6-50 MG per tablet 2 tablet, 2 tablet, Oral, BID, 2 tablet at 01/22/24 2052 **AND** polyethylene glycol (MIRALAX) packet 17 g, 17 g, Oral, Daily PRN **AND** bisacodyl (DULCOLAX) EC tablet 5 mg, 5 mg, Oral, Daily PRN **AND** bisacodyl (DULCOLAX)  suppository 10 mg, 10 mg, Rectal, Daily PRN, Danuta Dillon MD    Calcium Replacement - Follow Nurse / BPA Driven Protocol, , Does not apply, PRN, Danuta Dillon MD    carBAMazepine (TEGretol) tablet 200 mg, 200 mg, Oral, BID, Danuta Dillon MD    cefTRIAXone (ROCEPHIN) 2,000 mg in sodium chloride 0.9 % 100 mL IVPB, 2,000 mg, Intravenous, Q24H, Danuta Dillon MD    cetirizine (zyrTEC) tablet 10 mg, 10 mg, Oral, Daily, Danuta Dillon MD    demeclocycline (DECLOMYCIN) tablet 300 mg, 300 mg, Oral, BID, Danuta Dillon MD    DULoxetine (CYMBALTA) DR capsule 30 mg, 30 mg, Oral, Daily, Danuta Dillon MD    Enoxaparin Sodium (LOVENOX) syringe 40 mg, 40 mg, Subcutaneous, Daily, Danuta Dillon MD, 40 mg at 01/22/24 1723    hydrALAZINE (APRESOLINE) tablet 100 mg, 100 mg, Oral, TID, Danuta Dillon MD    Magnesium Standard Dose Replacement - Follow Nurse / BPA Driven Protocol, , Does not apply, Wilma VELEZ Dhamayanthi, MD    melatonin tablet 5 mg, 5 mg, Oral, Nightly PRN, Danuta Dillon MD, 5 mg at 01/22/24 2052    montelukast (SINGULAIR) tablet 10 mg, 10 mg, Oral, Nightly, Danuta Dillon MD, 10 mg at 01/22/24 2052    nitroglycerin (NITROSTAT) SL tablet 0.4 mg, 0.4 mg, Sublingual, Q5 Min PRN, Danuta Dillon MD    ondansetron (ZOFRAN) injection 4 mg, 4 mg, Intravenous, Q6H PRN, Danuta Dillon MD    Phosphorus Replacement - Follow Nurse / BPA Driven Protocol, , Does not apply, PRN, Danuta Dillon MD    Potassium Replacement - Follow Nurse / BPA Driven Protocol, , Does not apply, PRN, Danuta Dillon MD    sodium bicarbonate tablet 650 mg, 650 mg, Oral, Daily, Danuta Dillon MD    [COMPLETED] Insert Peripheral IV, , , Once **AND** sodium chloride 0.9 % flush 10 mL, 10 mL, Intravenous, PRN, Danuta Dillon MD    sodium chloride 0.9 % flush 10 mL, 10 mL, Intravenous, Q12H,  Danuta Dillon MD, 10 mL at 01/22/24 2058    sodium chloride 0.9 % flush 10 mL, 10 mL, Intravenous, PRN, Danuta Dillon MD    sodium chloride 0.9 % infusion 40 mL, 40 mL, Intravenous, PRN, Danuta Dillon MD    sodium chloride 0.9 % infusion, 100 mL/hr, Intravenous, Continuous, Danuta Dillon MD, Last Rate: 100 mL/hr at 01/23/24 1650, 100 mL/hr at 01/23/24 1650    I have reviewed medication list.    Assessment & Plan     Principal Problem:    Near Syncope  -  Etiology unclear, could be infectious , PT evaluation  Active Problems:     UTI -  IV Abx, culture pending     HTN - Started back on home medicine     Urge incontinence with urinary frequency - Start back on Flomax     SIADH - Start back on demeclocycline     Major recurrent depression - continue cymbalta     Hx of vertebral Fx - symptomatic Treatment     Hypothyroid - continue synthroid     Generalized weakness - PT/OT, may need inpatient rehab     Stress ulcer/DVT prophylaxis                   Plan for disposition: May need rehab at discharge     Danuta Dillon MD  01/23/24  19:56 EST

## 2024-01-25 PROBLEM — R55 SYNCOPE: Status: RESOLVED | Noted: 2024-01-22 | Resolved: 2024-01-25

## 2024-01-25 LAB
IRON 24H UR-MRATE: 32 MCG/DL (ref 59–158)
IRON SATN MFR SERPL: 14 % (ref 20–50)
TIBC SERPL-MCNC: 229 MCG/DL (ref 298–536)
TRANSFERRIN SERPL-MCNC: 154 MG/DL (ref 200–360)

## 2024-01-25 PROCEDURE — 97116 GAIT TRAINING THERAPY: CPT

## 2024-01-25 PROCEDURE — 25010000002 ENOXAPARIN PER 10 MG: Performed by: FAMILY MEDICINE

## 2024-01-25 PROCEDURE — 97530 THERAPEUTIC ACTIVITIES: CPT

## 2024-01-25 PROCEDURE — 97110 THERAPEUTIC EXERCISES: CPT

## 2024-01-25 RX ORDER — LEVOFLOXACIN 500 MG/1
500 TABLET, FILM COATED ORAL DAILY
Qty: 7 TABLET | Refills: 0 | Status: DISCONTINUED | OUTPATIENT
Start: 2024-01-25 | End: 2024-01-26 | Stop reason: HOSPADM

## 2024-01-25 RX ORDER — LEVOFLOXACIN 500 MG/1
500 TABLET, FILM COATED ORAL EVERY 24 HOURS
Qty: 7 TABLET | Refills: 0 | Status: SHIPPED | OUTPATIENT
Start: 2024-01-25 | End: 2024-02-01

## 2024-01-25 RX ADMIN — SODIUM BICARBONATE 650 MG: 650 TABLET ORAL at 08:23

## 2024-01-25 RX ADMIN — Medication 5 MG: at 22:37

## 2024-01-25 RX ADMIN — LEVOFLOXACIN 500 MG: 500 TABLET, FILM COATED ORAL at 15:39

## 2024-01-25 RX ADMIN — HYDRALAZINE HYDROCHLORIDE 100 MG: 25 TABLET ORAL at 15:39

## 2024-01-25 RX ADMIN — CARBAMAZEPINE 200 MG: 200 TABLET ORAL at 20:36

## 2024-01-25 RX ADMIN — Medication 10 ML: at 08:27

## 2024-01-25 RX ADMIN — DEMECLOCYCLINE HYDROCHLORIDE 300 MG: 150 TABLET, FILM COATED ORAL at 20:37

## 2024-01-25 RX ADMIN — TAMSULOSIN HYDROCHLORIDE 0.4 MG: 0.4 CAPSULE ORAL at 08:25

## 2024-01-25 RX ADMIN — ACETAMINOPHEN 650 MG: 325 TABLET, FILM COATED ORAL at 13:36

## 2024-01-25 RX ADMIN — DOCUSATE SODIUM AND SENNOSIDES 2 TABLET: 8.6; 5 TABLET, FILM COATED ORAL at 20:36

## 2024-01-25 RX ADMIN — DOCUSATE SODIUM AND SENNOSIDES 2 TABLET: 8.6; 5 TABLET, FILM COATED ORAL at 08:22

## 2024-01-25 RX ADMIN — CETIRIZINE HYDROCHLORIDE 10 MG: 10 TABLET, FILM COATED ORAL at 08:25

## 2024-01-25 RX ADMIN — HYDRALAZINE HYDROCHLORIDE 100 MG: 25 TABLET ORAL at 20:37

## 2024-01-25 RX ADMIN — ENOXAPARIN SODIUM 40 MG: 100 INJECTION SUBCUTANEOUS at 15:39

## 2024-01-25 RX ADMIN — MONTELUKAST 10 MG: 10 TABLET, FILM COATED ORAL at 20:36

## 2024-01-25 RX ADMIN — DEMECLOCYCLINE HYDROCHLORIDE 300 MG: 150 TABLET, FILM COATED ORAL at 08:22

## 2024-01-25 RX ADMIN — DULOXETINE HYDROCHLORIDE 30 MG: 30 CAPSULE, DELAYED RELEASE ORAL at 08:25

## 2024-01-25 RX ADMIN — HYDRALAZINE HYDROCHLORIDE 100 MG: 25 TABLET ORAL at 08:22

## 2024-01-25 RX ADMIN — CARBAMAZEPINE 200 MG: 200 TABLET ORAL at 08:23

## 2024-01-25 NOTE — THERAPY TREATMENT NOTE
Subjective: Pt agreeable to therapeutic plan of care.    Objective:     Bed mobility - Min-A supine to sit  Transfers - Min-A and with rolling walker sit to stand from edge of the bed and then a little more assist sit to stand from bedside chair.    Ambulation - 45 + 35 feet CGA and with rolling walker    Therapeutic Exercise - 10 Reps B LE AROM supported sitting / chair    Vitals: WNL    Pain: 4 VAS   Location: back  Intervention for pain: Repositioned, Increased Activity, and Therapeutic Presence    Education: Provided education on the importance of mobility in the acute care setting, Verbal/Tactile Cues, Transfer Training, and Gait Training    Assessment: Marek Wu presents with functional mobility impairments which indicate the need for skilled intervention. Tolerating session today without incident. Not reports of dizziness with mobility today. Pt is very motivated to recover his mobility. Pt will need rehab at d/c.  Will continue to follow and progress as tolerated.     Plan/Recommendations:   If medically appropriate, Moderate Intensity Therapy recommended post-acute care. This is recommended as therapy feels the patient would require 3-4 days per week.  SNF would be the preferred choice.  Pt requires no DME at discharge.     Pt desires Skilled Rehab placement at discharge. Pt cooperative; agreeable to therapeutic recommendations and plan of care.     Basic Mobility 6-click:  Rollin = Total, A lot = 2, A little = 3; 4 = None  Supine>Sit:   1 = Total, A lot = 2, A little = 3; 4 = None   Sit>Stand with arms:  1 = Total, A lot = 2, A little = 3; 4 = None  Bed>Chair:   1 = Total, A lot = 2, A little = 3; 4 = None  Ambulate in room:  1 = Total, A lot = 2, A little = 3; 4 = None  3-5 Steps with railin = Total, A lot = 2, A little = 3; 4 = None  Score: 17    Modified [place]: 4 = Moderately severe disability (Unable to attend to own bodily needs without assistance, and unable to walk  unassisted)     Post-Tx Position: Up in Chair, Alarms activated, and Call light and personal items within reach  PPE: gloves and surgical mask

## 2024-01-25 NOTE — PROGRESS NOTES
LOS: 1 day   Patient Care Team:  Danuta Dillon MD as PCP - General (Family Medicine)        Subjective   Lying on bed    Interval History: None    Patient Complaints: Feeling weak    Review of Systems   Constitutional:  Positive for activity change and fatigue.   Eyes: Negative.    Respiratory: Negative.     Cardiovascular: Negative.    Gastrointestinal:  Positive for blood in stool.   Endocrine: Negative.    Genitourinary:  Positive for frequency.   Musculoskeletal:  Positive for gait problem.   Neurological:  Positive for dizziness, weakness and light-headedness.   Psychiatric/Behavioral:  The patient is nervous/anxious.         Objective     Vital Signs  Temp:  [97.7 °F (36.5 °C)-98.5 °F (36.9 °C)] 97.8 °F (36.6 °C)  Heart Rate:  [63-77] 77  Resp:  [16-20] 19  BP: (137-161)/(70-81) 137/71    Physical Exam:     General Appearance:    Alert, cooperative, in no acute distress   Ears:    Ears appear intact with no abnormalities noted   Throat:   No oral lesions, no thrush, oral mucosa moist   Neck:   No adenopathy, supple, trachea midline, no thyromegaly, no   carotid bruit, no JVD   Lungs:     Clear to auscultation, respirations regular, even and               Unlabored     Heart:    Regular rhythm and normal rate, normal S1 and S2, no          murmur, no gallop, no rub, no click   Abdomen:     Normal bowel sounds, no masses, no organomegaly, soft      nontender, nondistended, no guarding, no rebound                tenderness   Extremities:   Moves all extremities well, no edema, no cyanosis, no           redness   Skin:   No bleeding, bruising or rash   Neurologic:   Cranial nerves 2 - 12 grossly intact, sensation intact, DTR       present and equal bilaterally        Results Review:    Lab Results (last 24 hours)       Procedure Component Value Units Date/Time    Blood Culture - Blood, Arm, Left [[id number]]  (Normal) Collected: 01/23/24 1615    Specimen: Blood from Arm, Left Updated: 01/25/24 3161      Blood Culture No growth at 2 days    Blood Culture - Blood, Hand, Left [[id number]]  (Normal) Collected: 01/23/24 1616    Specimen: Blood from Hand, Left Updated: 01/25/24 1645     Blood Culture No growth at 2 days    Blood Culture - Blood, Arm, Right [[id number]]  (Normal) Collected: 01/23/24 1024    Specimen: Blood from Arm, Right Updated: 01/25/24 1045     Blood Culture No growth at 2 days    Narrative:      Less than seven (7) mL's of blood was collected.  Insufficient quantity may yield false negative results.    Iron Profile [[id number]]  (Abnormal) Collected: 01/24/24 2250    Specimen: Blood Updated: 01/25/24 0001     Iron 32 mcg/dL      Iron Saturation (TSAT) 14 %      Transferrin 154 mg/dL      TIBC 229 mcg/dL            Imaging Results (Last 24 Hours)       ** No results found for the last 24 hours. **             I reviewed the patient's other test results and agree with the interpretation    Medication Review:     Current Facility-Administered Medications:     acetaminophen (TYLENOL) tablet 650 mg, 650 mg, Oral, Q4H PRN, Danuta Dillon MD, 650 mg at 01/25/24 1336    sennosides-docusate (PERICOLACE) 8.6-50 MG per tablet 2 tablet, 2 tablet, Oral, BID, 2 tablet at 01/25/24 0822 **AND** polyethylene glycol (MIRALAX) packet 17 g, 17 g, Oral, Daily PRN **AND** bisacodyl (DULCOLAX) EC tablet 5 mg, 5 mg, Oral, Daily PRN **AND** bisacodyl (DULCOLAX) suppository 10 mg, 10 mg, Rectal, Daily PRN, Danuta Dillon MD    Calcium Replacement - Follow Nurse / BPA Driven Protocol, , Does not apply, PRN, Danuta Dillon MD    carBAMazepine (TEGretol) tablet 200 mg, 200 mg, Oral, BID, Danuta Dillon MD, 200 mg at 01/25/24 0823    cetirizine (zyrTEC) tablet 10 mg, 10 mg, Oral, Daily, Danuta Dillon MD, 10 mg at 01/25/24 0825    demeclocycline (DECLOMYCIN) tablet 300 mg, 300 mg, Oral, BID, Danuta Dillon MD, 300 mg at 01/25/24 0822    DULoxetine (CYMBALTA) DR capsule 30 mg,  30 mg, Oral, Daily, Danuta Dillon MD, 30 mg at 01/25/24 0825    Enoxaparin Sodium (LOVENOX) syringe 40 mg, 40 mg, Subcutaneous, Daily, Danuta Dillon MD, 40 mg at 01/25/24 1539    hydrALAZINE (APRESOLINE) tablet 100 mg, 100 mg, Oral, TID, Danuta Dillon MD, 100 mg at 01/25/24 1539    levoFLOXacin (LEVAQUIN) tablet 500 mg, 500 mg, Oral, Daily, Danuta Dillon MD, 500 mg at 01/25/24 1539    Magnesium Standard Dose Replacement - Follow Nurse / BPA Driven Protocol, , Does not apply, PRWilma ROSEN Dhamayanthi, MD    melatonin tablet 5 mg, 5 mg, Oral, Nightly PRN, Danuta Dillon MD, 5 mg at 01/24/24 2017    montelukast (SINGULAIR) tablet 10 mg, 10 mg, Oral, Nightly, Danuta Dillon MD, 10 mg at 01/24/24 2329    nitroglycerin (NITROSTAT) SL tablet 0.4 mg, 0.4 mg, Sublingual, Q5 Min PRN, Danuta Dillon MD    ondansetron (ZOFRAN) injection 4 mg, 4 mg, Intravenous, Q6H PRN, Danuta Dillon MD    Phosphorus Replacement - Follow Nurse / BPA Driven Protocol, , Does not apply, Wilma VELEZ Dhamayanthi, MD    Potassium Replacement - Follow Nurse / BPA Driven Protocol, , Does not apply, Wilma VELEZ Dhamayanthi, MD    sodium bicarbonate tablet 650 mg, 650 mg, Oral, Daily, Danuta Dillon MD, 650 mg at 01/25/24 0823    [COMPLETED] Insert Peripheral IV, , , Once **AND** sodium chloride 0.9 % flush 10 mL, 10 mL, Intravenous, PRN, Danuta Dillon MD    sodium chloride 0.9 % flush 10 mL, 10 mL, Intravenous, Q12H, Danuta Dillon MD, 10 mL at 01/25/24 0827    sodium chloride 0.9 % flush 10 mL, 10 mL, Intravenous, PRN, Danuta Dillon MD    sodium chloride 0.9 % infusion 40 mL, 40 mL, Intravenous, PRN, Danuta Dillon MD    sodium chloride 0.9 % infusion, 100 mL/hr, Intravenous, Continuous, Danuta Dillon MD, Last Rate: 100 mL/hr at 01/23/24 2116, 100 mL/hr at 01/23/24 2116    tamsulosin (FLOMAX) 24 hr capsule 0.4  mg, 0.4 mg, Oral, Daily, Danuta Dillon MD, 0.4 mg at 01/25/24 3220    I have reviewed medication list.    Assessment & Plan     Principal Problem:    Near Syncope  -  Etiology unclear, could be infectious , PT evaluation  Active Problems:     UTI with Proteus mirabilis -  continue IV Rocephin      Anemia - check Iron studies      HTN - Started back on home medicine     Urge incontinence with urinary frequency - Start back on Flomax     SIADH - Start back on demeclocycline     Major recurrent depression - continue cymbalta     Hx of vertebral Fx - symptomatic Treatment     Hypothyroid - continue synthroid     Generalized weakness - PT/OT,  inpatient rehab     Stress ulcer/DVT prophylaxis                   Plan for disposition: rehab at discharge  tomorrow    Danuta Dillon MD  01/25/24  17:26 EST

## 2024-01-25 NOTE — CASE MANAGEMENT/SOCIAL WORK
Continued Stay Note   Jose L     Patient Name: Marek Wu  MRN: [id number]  Today's Date: 1/25/2024    Admit Date: 1/22/2024    Plan: [facility] accepted and bed available 1/26. No precert required. PASRR approved.   Discharge Plan       Row Name 01/25/24 1325       Plan    Plan [facility] accepted and bed available 1/26. No precert required. PASRR approved.    Patient/Family in Agreement with Plan yes    Plan Comments CM followed up with [facility] liaison Mariam regarding acceptance. Reports she needs to meet with pt at bedside but should be able to accept and have beds available. Received update this afternoon that they can accept but questioned if he has had 3 midnights. He was at [facility] 12/31-1/10 which would count towards his 3 midnights. Spoke to patient at bedside and he is agreeable to plan. Received return phone call that bed will now not be available until tomorrow 1/26. Updated nursing and Dr Dillon by secure chat.             Case Management Readmission Assessment Note    Case Management Readmission Assessment (all recorded)       Readmission Interview       Row Name 01/25/24 3479             Readmission Indications    Is this hospitalization related to the prior hospital diagnosis? No      What was the reason you were admitted? Hospitalization 12/29-12/31 r/t hyponatremia. This hospitalization r/t general weakness, dizziness, UTI.        Row Name 01/25/24 4071             Recommendation for rehospitalization    Did you speak with your physician prior to coming to the hospital Yes      If yes, what physician did you speak with? Urology office      Who recommended you return to the hospital? Specialist      Did you seek care elsewhere prior to coming to the hospital? No        Row Name 01/25/24 5533             Follow up appointment    Do you have a PCP? Yes  Dr Dillon      Did you have an appointment with PCP/specialist after hospitalization within 7 days? No  Appt with Dr Dillon 1/19       Did you keep your appointment? Yes        Row Name 01/25/24 1553             Medications    Did you have newly prescribed medications at discharge? Yes  START taking:  sodium bicarbonate    STOP taking:  diclofenac sodium 100 MG 24 hr tablet (VOTAREN XR)   DULoxetine 60 MG capsule (CYMBALTA)   gabapentin 300 MG capsule (NEURONTIN)    ASK how to take:  famotidine 40 MG tablet (PEPCID)      Did you understand the reasons for your medications at discharge and how to take them? Yes      Did you understand the side effects of your medications? Yes      Are you taking all of you prescribed medications? Yes        Row Name 01/25/24 1553             Discharge Instructions    Did you understand your discharge instructions? Yes      Did your family/caregiver hear your instructions? Yes      Were you told to eat a special diet? No      Did you adhere to the diet? Yes      Were you given a number of someone to call if you had questions or concerns? Yes        Row Name 01/25/24 1553             Index discharge location/services    Where did you go upon discharge? Acute Rehabilitation  [facility] 12/31-1/10      Do you have supportive family or friends in the home? Yes      What services were arranged at discharge? Other (comment)  [facility]        Row Name 01/25/24 1558             Discharge Readiness    On a scale of 1-5 (5 being well prepared), how ready were you for discharge 4                  Megan Naegele, RN     Office Phone: [phone]  Office Cell: [phone]

## 2024-01-25 NOTE — SIGNIFICANT NOTE
01/25/24 1100   PASRR   PASRR Status Approved/Complete        Pt transfered to room 302 via gurney without difficulty, or incident. Pt placed 
on hospital bed in pos of comfort, pt oriented to room and given call button.  
CNA and receiving RN at the bedside for intake.  initial admitting VSS, PE WNL, 
NAD, no s/sxo of distress present.

## 2024-01-25 NOTE — PLAN OF CARE
Problem: Adult Inpatient Plan of Care  Goal: Plan of Care Review  Outcome: Ongoing, Not Progressing  Flowsheets (Taken 1/25/2024 0436)  Progress: no change  Plan of Care Reviewed With: patient  Goal: Patient-Specific Goal (Individualized)  Outcome: Ongoing, Not Progressing  Goal: Absence of Hospital-Acquired Illness or Injury  Outcome: Ongoing, Not Progressing  Intervention: Identify and Manage Fall Risk  Recent Flowsheet Documentation  Taken 1/25/2024 0415 by Margie Gaona RN  Safety Promotion/Fall Prevention:   assistive device/personal items within reach   clutter free environment maintained   fall prevention program maintained   nonskid shoes/slippers when out of bed   room organization consistent   safety round/check completed  Taken 1/25/2024 0218 by Margie Gaona RN  Safety Promotion/Fall Prevention:   assistive device/personal items within reach   clutter free environment maintained   fall prevention program maintained   nonskid shoes/slippers when out of bed   room organization consistent   safety round/check completed  Intervention: Prevent Infection  Recent Flowsheet Documentation  Taken 1/25/2024 0415 by Margie Gaona RN  Infection Prevention:   environmental surveillance performed   hand hygiene promoted   rest/sleep promoted   single patient room provided  Taken 1/25/2024 0218 by Margie Gaona RN  Infection Prevention:   environmental surveillance performed   hand hygiene promoted   rest/sleep promoted   single patient room provided  Goal: Optimal Comfort and Wellbeing  Outcome: Ongoing, Not Progressing  Goal: Readiness for Transition of Care  Outcome: Ongoing, Not Progressing     Problem: Fall Injury Risk  Goal: Absence of Fall and Fall-Related Injury  Outcome: Ongoing, Not Progressing  Intervention: Promote Injury-Free Environment  Recent Flowsheet Documentation  Taken 1/25/2024 0415 by Margie Gaona RN  Safety Promotion/Fall Prevention:   assistive device/personal items within reach    clutter free environment maintained   fall prevention program maintained   nonskid shoes/slippers when out of bed   room organization consistent   safety round/check completed  Taken 1/25/2024 0218 by Margie Gaona RN  Safety Promotion/Fall Prevention:   assistive device/personal items within reach   clutter free environment maintained   fall prevention program maintained   nonskid shoes/slippers when out of bed   room organization consistent   safety round/check completed     Problem: Skin Injury Risk Increased  Goal: Skin Health and Integrity  Outcome: Ongoing, Not Progressing   Goal Outcome Evaluation:  Plan of Care Reviewed With: patient        Progress: no change     Alert and oriented x 4. Able to verbalize needs and wants. Takes medication whole and tolerates well. Does not require O2 therapy at this time. No c/o dizziness/light-headedness. No c/o pain/discomfort/SOB noted. Continues to receive IV Rocephin, no s/s of adverse/allergic reaction noted. Continues to receive PO Demeclocycline, no s/s of adverse/allergic reaction noted. NaCl infusing at 100 ml/hr and tolerating well. PT recommending SNF placement. Discharge plan: Referral pending to [facility]. Currently in bed, eyes closed. Rise and fall of chest observed. Call bell in reach. Blood cultures pending.

## 2024-01-25 NOTE — PROGRESS NOTES
LOS: 0 days   Patient Care Team:  Danuta Dillon MD as PCP - General (Family Medicine)        Subjective   Lying on bed    Interval History: None    Patient Complaints: Feeling weak    Review of Systems   Constitutional:  Positive for activity change and fatigue.   Eyes: Negative.    Respiratory: Negative.     Cardiovascular: Negative.    Gastrointestinal:  Positive for blood in stool.   Endocrine: Negative.    Genitourinary:  Positive for frequency.   Musculoskeletal:  Positive for gait problem.   Neurological:  Positive for dizziness, weakness and light-headedness.   Psychiatric/Behavioral:  The patient is nervous/anxious.         Objective     Vital Signs  Temp:  [97.7 °F (36.5 °C)-98.6 °F (37 °C)] 98.1 °F (36.7 °C)  Heart Rate:  [63-84] 63  Resp:  [14-24] 24  BP: (115-161)/(70-83) 161/73    Physical Exam:     General Appearance:    Alert, cooperative, in no acute distress   Ears:    Ears appear intact with no abnormalities noted   Throat:   No oral lesions, no thrush, oral mucosa moist   Neck:   No adenopathy, supple, trachea midline, no thyromegaly, no   carotid bruit, no JVD   Lungs:     Clear to auscultation, respirations regular, even and               Unlabored     Heart:    Regular rhythm and normal rate, normal S1 and S2, no          murmur, no gallop, no rub, no click   Abdomen:     Normal bowel sounds, no masses, no organomegaly, soft      nontender, nondistended, no guarding, no rebound                tenderness   Extremities:   Moves all extremities well, no edema, no cyanosis, no           redness   Skin:   No bleeding, bruising or rash   Neurologic:   Cranial nerves 2 - 12 grossly intact, sensation intact, DTR       present and equal bilaterally        Results Review:    Lab Results (last 24 hours)       Procedure Component Value Units Date/Time    Blood Culture - Blood, Arm, Left [[id number]]  (Normal) Collected: 01/23/24 1615    Specimen: Blood from Arm, Left Updated: 01/24/24 1646      Blood Culture No growth at 24 hours    Blood Culture - Blood, Hand, Left [[id number]]  (Normal) Collected: 01/23/24 1616    Specimen: Blood from Hand, Left Updated: 01/24/24 1646     Blood Culture No growth at 24 hours    Blood Culture - Blood, Arm, Right [[id number]]  (Normal) Collected: 01/23/24 1024    Specimen: Blood from Arm, Right Updated: 01/24/24 1046     Blood Culture No growth at 24 hours    Narrative:      Less than seven (7) mL's of blood was collected.  Insufficient quantity may yield false negative results.    Urine Culture - Urine, Urine, Clean Catch [[id number]]  (Abnormal)  (Susceptibility) Collected: 01/22/24 1607    Specimen: Urine, Clean Catch Updated: 01/24/24 1009     Urine Culture >100,000 CFU/mL Proteus mirabilis    Narrative:      Colonization of the urinary tract without infection is common. Treatment is discouraged unless the patient is symptomatic, pregnant, or undergoing an invasive urologic procedure.    Susceptibility        Proteus mirabilis      OREN      Amoxicillin + Clavulanate Susceptible      Ampicillin Susceptible      Ampicillin + Sulbactam Susceptible      Cefazolin Resistant      Cefepime Susceptible      Ceftazidime Susceptible      Ceftriaxone Susceptible      Gentamicin Susceptible      Levofloxacin Susceptible      Nitrofurantoin Resistant      Piperacillin + Tazobactam Susceptible      Trimethoprim + Sulfamethoxazole Resistant                                  Imaging Results (Last 24 Hours)       ** No results found for the last 24 hours. **             I reviewed the patient's other test results and agree with the interpretation    Medication Review:     Current Facility-Administered Medications:     acetaminophen (TYLENOL) tablet 650 mg, 650 mg, Oral, Q4H PRN, Danuta Dillon MD    sennosides-docusate (PERICOLACE) 8.6-50 MG per tablet 2 tablet, 2 tablet, Oral, BID, 2 tablet at 01/23/24 2033 **AND** polyethylene glycol (MIRALAX) packet 17 g, 17 g, Oral, Daily PRN  **AND** bisacodyl (DULCOLAX) EC tablet 5 mg, 5 mg, Oral, Daily PRN **AND** bisacodyl (DULCOLAX) suppository 10 mg, 10 mg, Rectal, Daily PRN, Danuta Dillon MD    Calcium Replacement - Follow Nurse / BPA Driven Protocol, , Does not apply, Wilma VELEZ Dhamayanthi, MD    carBAMazepine (TEGretol) tablet 200 mg, 200 mg, Oral, BID, Danuta Dillon MD, 200 mg at 01/24/24 0912    cefTRIAXone (ROCEPHIN) 2,000 mg in sodium chloride 0.9 % 100 mL IVPB, 2,000 mg, Intravenous, Q24H, Danuta Dillon MD, Last Rate: 200 mL/hr at 01/24/24 1738, 2,000 mg at 01/24/24 1738    cetirizine (zyrTEC) tablet 10 mg, 10 mg, Oral, Daily, Danuta Dillon MD, 10 mg at 01/24/24 0912    demeclocycline (DECLOMYCIN) tablet 300 mg, 300 mg, Oral, BID, Danuta Dillon MD, 300 mg at 01/24/24 0912    DULoxetine (CYMBALTA) DR capsule 30 mg, 30 mg, Oral, Daily, Danuta Dillon MD, 30 mg at 01/24/24 0919    Enoxaparin Sodium (LOVENOX) syringe 40 mg, 40 mg, Subcutaneous, Daily, Danuta Dillon MD, 40 mg at 01/24/24 1738    hydrALAZINE (APRESOLINE) tablet 100 mg, 100 mg, Oral, TID, Danuta Dillon MD, 100 mg at 01/24/24 1738    Magnesium Standard Dose Replacement - Follow Nurse / BPA Driven Protocol, , Does not apply, Wilma VELEZ Dhamayanthi, MD    melatonin tablet 5 mg, 5 mg, Oral, Nightly PRN, Danuta Dillon MD, 5 mg at 01/24/24 2017    montelukast (SINGULAIR) tablet 10 mg, 10 mg, Oral, Nightly, Danuta Dillon MD, 10 mg at 01/23/24 2032    nitroglycerin (NITROSTAT) SL tablet 0.4 mg, 0.4 mg, Sublingual, Q5 Min PRN, Danuta Dillon MD    ondansetron (ZOFRAN) injection 4 mg, 4 mg, Intravenous, Q6H PRN, Danuta Dillon MD    Phosphorus Replacement - Follow Nurse / BPA Driven Protocol, , Does not apply, Wilma VELEZ Dhamayanthi, MD    Potassium Replacement - Follow Nurse / BPA Driven Protocol, , Does not apply, Wilma VELEZ Dhamayanthi, MD    sodium  bicarbonate tablet 650 mg, 650 mg, Oral, Daily, Danuta Dillon MD, 650 mg at 01/24/24 0912    [COMPLETED] Insert Peripheral IV, , , Once **AND** sodium chloride 0.9 % flush 10 mL, 10 mL, Intravenous, PRN, Danuta Dillon MD    sodium chloride 0.9 % flush 10 mL, 10 mL, Intravenous, Q12H, Danuta Dillon MD, 10 mL at 01/23/24 2032    sodium chloride 0.9 % flush 10 mL, 10 mL, Intravenous, PRN, Danuta Dillon MD    sodium chloride 0.9 % infusion 40 mL, 40 mL, Intravenous, PRN, Danuta Dillon MD    sodium chloride 0.9 % infusion, 100 mL/hr, Intravenous, Continuous, Danuta Dillon MD, Last Rate: 100 mL/hr at 01/23/24 2116, 100 mL/hr at 01/23/24 2116    tamsulosin (FLOMAX) 24 hr capsule 0.4 mg, 0.4 mg, Oral, Daily, Danuta Dillon MD, 0.4 mg at 01/24/24 1046    I have reviewed medication list.    Assessment & Plan     Principal Problem:    Near Syncope  -  Etiology unclear, could be infectious , PT evaluation  Active Problems:     UTI with Proteus mirabilis -  continue IV Rocephin      Anemia - check Iron studies      HTN - Started back on home medicine     Urge incontinence with urinary frequency - Start back on Flomax     SIADH - Start back on demeclocycline     Major recurrent depression - continue cymbalta     Hx of vertebral Fx - symptomatic Treatment     Hypothyroid - continue synthroid     Generalized weakness - PT/OT, may need inpatient rehab     Stress ulcer/DVT prophylaxis                   Plan for disposition: May need rehab at discharge     Danuta Dillon MD  01/24/24  20:28 EST

## 2024-01-25 NOTE — PLAN OF CARE
Goal Outcome Evaluation:     Bed mobility - Min-A supine to sit  Transfers - Min-A and with rolling walker sit to stand from edge of the bed and then a little more assist sit to stand from bedside chair.    Ambulation - 45 + 35 feet CGA and with rolling walker    Therapeutic Exercise - 10 Reps B LE AROM supported sitting / chair    If medically appropriate, Moderate Intensity Therapy recommended post-acute care. This is recommended as therapy feels the patient would require 3-4 days per week.  SNF would be the preferred choice.  Pt requires no DME at discharge.     Pt desires Skilled Rehab placement at discharge. Pt cooperative; agreeable to therapeutic recommendations and plan of care.

## 2024-01-25 NOTE — PLAN OF CARE
Goal Outcome Evaluation:      Pt alert and orient x 4. Makes needs known verbally. D/C pushed to [facility] due to room avaliablity at rehab. Pt stable at this time.

## 2024-01-25 NOTE — CONSULTS
Visited with patient at bedside.    Patient reports that he is doing well, hoping to be discharged today or tomorrow. He reports concerning his ailment non-life threatening, but was needing hospitalization for treatment. Patient life reviewed about his career, spiritual formation, family dynamics, physical/mental/spiritual recovery. Supported patient spiritually by praying over topics discussed.    Will continue to follow.     Seferino Vora

## 2024-01-26 VITALS
RESPIRATION RATE: 18 BRPM | TEMPERATURE: 98 F | HEART RATE: 76 BPM | WEIGHT: 261 LBS | SYSTOLIC BLOOD PRESSURE: 143 MMHG | OXYGEN SATURATION: 96 % | BODY MASS INDEX: 30.82 KG/M2 | HEIGHT: 77 IN | DIASTOLIC BLOOD PRESSURE: 72 MMHG

## 2024-01-26 RX ADMIN — DOCUSATE SODIUM AND SENNOSIDES 2 TABLET: 8.6; 5 TABLET, FILM COATED ORAL at 10:25

## 2024-01-26 RX ADMIN — DEMECLOCYCLINE HYDROCHLORIDE 300 MG: 150 TABLET, FILM COATED ORAL at 10:26

## 2024-01-26 RX ADMIN — SODIUM BICARBONATE 650 MG: 650 TABLET ORAL at 10:26

## 2024-01-26 RX ADMIN — LEVOFLOXACIN 500 MG: 500 TABLET, FILM COATED ORAL at 10:26

## 2024-01-26 RX ADMIN — CETIRIZINE HYDROCHLORIDE 10 MG: 10 TABLET, FILM COATED ORAL at 10:26

## 2024-01-26 RX ADMIN — HYDRALAZINE HYDROCHLORIDE 100 MG: 25 TABLET ORAL at 10:25

## 2024-01-26 RX ADMIN — DULOXETINE HYDROCHLORIDE 30 MG: 30 CAPSULE, DELAYED RELEASE ORAL at 10:25

## 2024-01-26 RX ADMIN — CARBAMAZEPINE 200 MG: 200 TABLET ORAL at 10:26

## 2024-01-26 RX ADMIN — TAMSULOSIN HYDROCHLORIDE 0.4 MG: 0.4 CAPSULE ORAL at 10:25

## 2024-01-26 NOTE — CASE MANAGEMENT/SOCIAL WORK
Case Management Discharge Note      Final Note: [facility].      Selected Continued Care - Discharged on 1/26/2024 Admission date: 1/22/2024 - Discharge disposition: Skilled Nursing Facility (DC - External)      Destination Coordination complete.      Service Provider Selected Services Address Phone Fax Patient Preferred    [facility] [address] [place] [place] [address] [phone] --             Transportation Services  Private: Car    Final Discharge Disposition Code: 03 - skilled nursing facility (SNF)

## 2024-01-26 NOTE — PLAN OF CARE
Goal Outcome Evaluation:              Outcome Evaluation: Pt. has been resting throughout he shift without dario complaints. Pt. is discharging today to [facility].

## 2024-01-26 NOTE — PLAN OF CARE
Goal Outcome Evaluation:  Plan of Care Reviewed With: patient        Progress: no change  Outcome Evaluation: Patient had two episodes of vomiting last night.  Refused medications.  No other complaints.

## 2024-01-26 NOTE — SIGNIFICANT NOTE
01/26/24 1248   OTHER   Discipline physical therapist   Rehab Time/Intention   Session Not Performed other (see comments)  (hospital d/c pending)   Recommendation   PT - Next Appointment 01/29/24

## 2024-01-26 NOTE — CASE MANAGEMENT/SOCIAL WORK
Continued Stay Note  RODOLFO Domingo     Patient Name: Marek Wu  MRN: [id number]  Today's Date: 1/26/2024    Admit Date: 1/22/2024    Plan: [facility] accepted and bed available 1/26. No precert required. PASRR approved.   Discharge Plan       Row Name 01/26/24 1151       Plan    Plan [facility] accepted and bed available 1/26. No precert required. PASRR approved.    Patient/Family in Agreement with Plan yes    Plan Comments CM confirmed with [facility] liaison Mariam that bed is ready any time for pt to come. CM notified nurse while she was in patient's room. Brother in law Lincoln to provide transportation.           Megan Naegele, RN     Office Phone: [phone]  Office Cell: [phone]

## 2024-01-27 NOTE — DISCHARGE SUMMARY
Date of Discharge:  1/26/2024    Discharge Diagnosis:    Near Syncope   Active Problems:     UTI with Proteus mirabilis      Anemia      HTN      Urge incontinence with urinary frequency      SIADH      Major recurrent depression      Hx of vertebral Fx      Hypothyroid -     Generalized weakness     Presenting Problem/History of Present Illness  Active Hospital Problems    Diagnosis  POA    Urinary incontinence [R32]  Yes    Compression fracture of L2 lumbar vertebra [S32.020A]  Yes      Resolved Hospital Problems    Diagnosis Date Resolved POA    **Syncope [R55] 01/25/2024 Yes          Hospital Course  Patient is a 68 y.o. male presented with Near syncope and weakness, Dx with UTI and treated with IV Abx, pt had PT/OT evaluation done, they recommended rehab, and patient is agreeable for this [facility] consulted and they accepted the pt and so pt is discharged to rehab.      Procedures Performed         Consults:   Consults       No orders found from 12/24/2023 to 1/23/2024.            Pertinent Test Results:  Lab Results (last 24 hours)       ** No results found for the last 24 hours. **           I have reviewed lab results.    Condition on Discharge:  Improved     Vital Signs  Temp:  [98 °F (36.7 °C)] 98 °F (36.7 °C)  Heart Rate:  [76] 76  Resp:  [18] 18  BP: (143)/(72) 143/72    Physical Exam:     General Appearance:    Alert, cooperative, in no acute distress   Ears:    Ears appear intact with no abnormalities noted   Throat:   No oral lesions, no thrush, oral mucosa moist   Neck:   No adenopathy, supple, trachea midline, no thyromegaly, no   carotid bruit, no JVD   Lungs:     Clear to auscultation,respirations regular, even and                Unlabored     Heart:    Regular rhythm and normal rate, normal S1 and S2, no          murmur, no gallop, no rub, no click   Abdomen:     Normal bowel sounds, no masses, no organomegaly, soft      non-tender, non-distended, no guarding, no rebound                 tenderness   Extremities:   Moves all extremities well, no edema, no cyanosis, no           redness   Skin:   No bleeding, bruising or rash   Neurologic:   Cranial nerves 2 - 12 grossly intact, sensation intact, DTR       present and equal bilaterally       Discharge Disposition  Skilled Nursing Facility (DC - External)    Discharge Medications     Discharge Medications        New Medications        Instructions Start Date   levoFLOXacin 500 MG tablet  Commonly known as: LEVAQUIN   500 mg, Oral, Every 24 Hours             Continue These Medications        Instructions Start Date   acetaminophen 325 MG tablet  Commonly known as: TYLENOL   650 mg, Oral, Every 6 Hours PRN      carBAMazepine 200 MG tablet  Commonly known as: TEGretol   200 mg, Oral, 2 Times Daily      demeclocycline 300 MG tablet  Commonly known as: DECLOMYCIN   300 mg, Oral, 2 Times Daily      DULoxetine 60 MG capsule  Commonly known as: CYMBALTA   60 mg, Oral, Daily      famotidine 20 MG tablet  Commonly known as: PEPCID   20 mg, Oral, 2 Times Daily Before Meals      fexofenadine 180 MG tablet  Commonly known as: ALLEGRA   180 mg, Oral, Daily      Gemtesa 75 MG tablet  Generic drug: Vibegron   75 mg, Oral, Daily      hydrALAZINE 100 MG tablet  Commonly known as: APRESOLINE   100 mg, Oral, 3 Times Daily      montelukast 10 MG tablet  Commonly known as: SINGULAIR   10 mg, Oral, Nightly      sodium bicarbonate 650 MG tablet   650 mg, Oral, Daily      tamsulosin 0.4 MG capsule 24 hr capsule  Commonly known as: FLOMAX   1 capsule, Oral, Nightly               Discharge Diet:   Diet Instructions    Regular Cardiac diet  Thin             Activity at Discharge:   Activity Instructions    Increase as tolerated.           Follow-up Appointments  Future Appointments   Date Time Provider Department Center   3/13/2024 12:30 PM Елена Pierce APRN [facility]     Additional Instructions for the Follow-ups that You Need to Schedule       Call MD With Problems /  Concerns   As directed      Instructions: Called [phone] if fever greater then 101, nausea/vomiting, chest pain, shortness of breath or abdominal pain    Order Comments: Instructions: Called [phone] if fever greater then 101, nausea/vomiting, chest pain, shortness of breath or abdominal pain         Discharge Follow-up with PCP   As directed       Currently Documented PCP:    Danuta Dillon MD    PCP Phone Number:    [phone]     Follow Up Details: Hospital Follow up appointment with Dr. Dillon on .   Office number [phone]       Additional information on Labs and Follow-ups:    Follow up with PCP            Test Results Pending at Discharge  Pending Labs       Order Current Status    Blood Culture - Blood, Arm, Left Preliminary result    Blood Culture - Blood, Arm, Right Preliminary result    Blood Culture - Blood, Hand, Left Preliminary result             Danuta Dillon MD  01/27/24  10:55 EST

## 2024-01-28 LAB
BACTERIA SPEC AEROBE CULT: NORMAL

## 2024-03-13 NOTE — PROGRESS NOTES
Neurosurgical Consultation    Chief Complaint   Patient presents with    Back Pain        HPI: This is a 68-year-old gentleman who has been evaluated by neurosurgery for L2 compression fracture.  This is being managed by the nurse practitioner Елена Pierce.  There was some question as to whether or not he had normal pressure hydrocephalus as he had the classic triad of symptoms.  He was scheduled to be evaluated by the nurse practitioner today however was switched over to my clinic.  He comments that all of his normal pressure hydrocephalus symptoms have completely resolved after changing of 2 medications by his nephrologist.  He does have persistent back pain.  He does not have any new neurologic symptoms including absence of new indication of radiculopathy neurogenic claudication or cauda equina syndrome.  His initial evaluation was in the hospital on December 2, 2023.  He is greater than 3 months removed from the injury.    History reviewed. No pertinent past medical history.     History reviewed. No pertinent surgical history.     Current Outpatient Medications on File Prior to Visit   Medication Sig Dispense Refill    acetaminophen (TYLENOL) 325 MG tablet Take 2 tablets by mouth Every 6 (Six) Hours As Needed for Mild Pain.      carBAMazepine (TEGretol) 200 MG tablet Take 1 tablet by mouth 2 (Two) Times a Day.      demeclocycline (DECLOMYCIN) 300 MG tablet Take 1 tablet by mouth 2 (Two) Times a Day.      DULoxetine (CYMBALTA) 60 MG capsule Take 1 capsule by mouth Daily.      famotidine (PEPCID) 20 MG tablet Take 1 tablet by mouth 2 (Two) Times a Day Before Meals.      fexofenadine (ALLEGRA) 180 MG tablet Take 1 tablet by mouth Daily.      hydrALAZINE (APRESOLINE) 100 MG tablet Take 1 tablet by mouth 3 (Three) Times a Day.      montelukast (SINGULAIR) 10 MG tablet Take 1 tablet by mouth Every Night.      sodium bicarbonate 650 MG tablet Daily.      tamsulosin (FLOMAX) 0.4 MG capsule 24 hr capsule Take 1 capsule  "by mouth Every Night.      Vibegron (Gemtesa) 75 MG tablet Take 1 tablet by mouth Daily.       No current facility-administered medications on file prior to visit.        No Known Allergies     Social History     Socioeconomic History    Marital status: Single   Tobacco Use    Smoking status: Former     Types: Cigarettes    Smokeless tobacco: Never   Vaping Use    Vaping status: Never Used   Substance and Sexual Activity    Alcohol use: Not Currently    Drug use: Not Currently    Sexual activity: Defer        If the patient is a current tobacco consumer, then adequate time was spent counseling them to quit. If the patient does not currently consume tobacco products, then adequate time was spent encouraging continued abstinence from this product.     Review of Systems     Physical Examination:     Vitals:    03/14/24 1020   BP: 155/85   Pulse: 93   Weight: 117 kg (258 lb 3.2 oz)   Height: 195.6 cm (77\")   PainSc:   8        Physical Exam     Neurological Exam   Neurological examination appears stable compared to Елена Pierce's evaluation.  I do not appreciate any red flag signs.    Result Review  The following data was reviewed by: Noel Bazzi MD on 03/14/2024:    Data reviewed : Radiologic studies CT head did not show profound ventriculomegaly.      Assessment/plan:  This is a 68-year-old gentleman with an L2 compression fracture with persistent pain greater than 3 months from injury.  I recommend x-rays of the lumbar spine including scoliosis x-ray to evaluate for any worsening.  He will follow-up with Елена Pierce in 1 week for clinical reevaluation and exploration of possible intervention.  He does not have any indication of normal pressure hydrocephalus.  He can follow-up with me on an as-needed basis.    Diagnoses and all orders for this visit:    1. Compression fracture of L2 vertebra with routine healing, subsequent encounter (Primary)  -     XR Spine Scoliosis 2 or 3 Views; Future  -     XR Spine Lumbar " Complete With Flex & Ext; Future         Return in about 1 week (around 3/21/2024).

## 2024-03-14 ENCOUNTER — OFFICE VISIT (OUTPATIENT)
Dept: NEUROSURGERY | Facility: CLINIC | Age: 69
End: 2024-03-14
Payer: MEDICARE

## 2024-03-14 ENCOUNTER — HOSPITAL ENCOUNTER (OUTPATIENT)
Dept: GENERAL RADIOLOGY | Facility: HOSPITAL | Age: 69
Discharge: HOME | End: 2024-03-14
Payer: MEDICARE

## 2024-03-14 VITALS
BODY MASS INDEX: 30.49 KG/M2 | WEIGHT: 258.2 LBS | DIASTOLIC BLOOD PRESSURE: 85 MMHG | SYSTOLIC BLOOD PRESSURE: 155 MMHG | HEART RATE: 93 BPM | HEIGHT: 77 IN

## 2024-03-14 DIAGNOSIS — S32.020D COMPRESSION FRACTURE OF L2 VERTEBRA WITH ROUTINE HEALING, SUBSEQUENT ENCOUNTER: ICD-10-CM

## 2024-03-14 DIAGNOSIS — S32.020D COMPRESSION FRACTURE OF L2 VERTEBRA WITH ROUTINE HEALING, SUBSEQUENT ENCOUNTER: Primary | ICD-10-CM

## 2024-03-14 PROCEDURE — 72114 X-RAY EXAM L-S SPINE BENDING: CPT

## 2024-03-14 PROCEDURE — 72082 X-RAY EXAM ENTIRE SPI 2/3 VW: CPT

## 2024-03-14 RX ORDER — SODIUM BICARBONATE 650 MG/1
TABLET ORAL EVERY 24 HOURS
COMMUNITY
Start: 2024-02-12

## 2024-03-18 NOTE — PROGRESS NOTES
"Subjective   History of Present Illness: Marek Wu is a 68 y.o. male is here today for follow-up for L2 compression fracture.  Patient was last seen by Dr. Bazzi on 3/14/2024 with persistent pain greater than 3 months from an injury and was recommended follow-up imaging and he is here for review of that today  Patient continues with back pain in his upper lumbar spine.  This is worse with movement.  He has been taken Tylenol for his pain and has been noticing that it is not controlling his pain much anymore.  His pain is worse when he bends forward.  He absolutely denies any leg pain or radiculopathy.  He does have some chronic knee pain but again no radiating pain from his back.  He is scheduled for left shoulder replacement in April.  He denies any recent weight loss, he denies any pain laying flat.    History of Present Illness    The following portions of the patient's history were reviewed and updated as appropriate: allergies, current medications, past family history, past medical history, past social history, past surgical history, and problem list.    Review of Systems   Constitutional:  Positive for activity change.   HENT: Negative.     Eyes: Negative.    Respiratory: Negative.     Cardiovascular: Negative.    Gastrointestinal: Negative.    Endocrine: Negative.    Genitourinary: Negative.    Musculoskeletal:  Positive for arthralgias, back pain and myalgias.   Skin: Negative.    Allergic/Immunologic: Negative.    Neurological:  Positive for weakness (bilateral legs).   Psychiatric/Behavioral:  Positive for sleep disturbance.    All other systems reviewed and are negative.      Objective     ./64   Pulse 98   Ht 195.6 cm (77\")   Wt 118 kg (261 lb)   BMI 30.95 kg/m²    Body mass index is 30.95 kg/m².    Vitals:    03/21/24 0921   PainSc:   8          Physical Exam  Neurologic Exam  Focal back pain with muscular tenderness bilaterally along upper lumbar spine    Assessment & Plan "   Independent Review of Radiographic Studies:      I personally reviewed the images from the following studies.    Scoliosis imaging 3/14/2024, lumbar flexion-extension    Mild Positive sagittal balance 5.4   retrolisthesis of L3 that increases with flexion slight increase of facet widening at L2-L3  Stable severe chronic fracture at L2 as well as mild chronic T12 compression fracture      Lumbar MRI 12/2/2023    Severe compression deformity L2 with some inflammatory changes along pedicle.  Chronic spondylitic changes elsewhere noted mainly at L3-L4 and L4-L5    Medical Decision Making:      Marek Wuis a 68 y.o. male following up on his L2 compression fracture and review of imaging.  Patient continues with mainly focal back pain along his compression fracture.  He has no radicular pain or radiculopathy.  Imaging was reviewed demonstrating a stable severe chronic fracture of L2.  No significant movement on flexion-extension in relation to the L2 fracture but there was some slight subluxation on flexion maneuvers at L3 with some possible facet widening . I Would like to order a CT scan for further evaluation to rule out instability versus some chronic pain development from the L2 fracture.  Being 3+ months out of the injury he may already have some healing noted but I did recommend patient wear his brace until we can rule out instability.  If CT is negative for any posterior involvement, patient will be set for an injection in his spine to help with his pain.  Pt Should also engage in physical therapy.        Diagnoses and all orders for this visit:    1. Compression fracture of L2 vertebra with routine healing, subsequent encounter (Primary)  -     CT Lumbar Spine Without Contrast; Future      Return if symptoms worsen or fail to improve.    This patient was examined wearing appropriate personal protective equipment.     Marek Hickeytock  reports that he has quit smoking. His smoking use included  cigarettes. He has never used smokeless tobacco.      Body mass index is 30.95 kg/m².    BMI is >= 30 and <35. (Class 1 Obesity). The following options were offered after discussion; exercise counseling/recommendations and nutrition counseling/recommendations     Patient's blood pressure was reviewed.  Recommendations for  a low-salt diet and exercise to maintain/improve BP in addition to taking any presribed medications.    Advance Care Planning   ACP discussion was held with the patient during this visit. Patient has an advance directive (not in EMR), copy requested.         Елена Pierce, CHENG  03/21/24  10:42 EDT

## 2024-03-21 ENCOUNTER — OFFICE VISIT (OUTPATIENT)
Dept: NEUROSURGERY | Facility: CLINIC | Age: 69
End: 2024-03-21
Payer: MEDICARE

## 2024-03-21 ENCOUNTER — TELEPHONE (OUTPATIENT)
Dept: NEUROSURGERY | Facility: CLINIC | Age: 69
End: 2024-03-21

## 2024-03-21 VITALS
BODY MASS INDEX: 30.82 KG/M2 | HEIGHT: 77 IN | SYSTOLIC BLOOD PRESSURE: 100 MMHG | DIASTOLIC BLOOD PRESSURE: 64 MMHG | HEART RATE: 98 BPM | WEIGHT: 261 LBS

## 2024-03-21 DIAGNOSIS — S32.020D COMPRESSION FRACTURE OF L2 VERTEBRA WITH ROUTINE HEALING, SUBSEQUENT ENCOUNTER: Primary | ICD-10-CM

## 2024-03-21 RX ORDER — LEVOTHYROXINE SODIUM 0.03 MG/1
TABLET ORAL EVERY 24 HOURS
COMMUNITY
Start: 2024-03-21

## 2024-03-21 RX ORDER — TRIAMCINOLONE ACETONIDE 1 MG/G
CREAM TOPICAL
COMMUNITY
Start: 2024-03-13

## 2024-03-21 NOTE — TELEPHONE ENCOUNTER
Patient called asking how soon does he need the imaging done so he can get his injection done. He stated they are telling him near where he lives they are saying it will be until April 29th. He said he will call back tomorrow.

## 2024-03-25 NOTE — TELEPHONE ENCOUNTER
"HUB ok to relay the message to the patient:  \" He will need to schedule his imaging first before we can get his injection scheduled\"     Called the patient and there was no answer, his mailbox is full and cannot accept more calls at this time.  "

## 2024-04-01 NOTE — TELEPHONE ENCOUNTER
PATIENT CALLED, PATIENT COMPLETED CT 03/29/24 @ [facility]. PATIENT HAS CD. PATIENT IS REQUESTING A C/B TO DISCUSS RESULTS AND WHEN HE CAN GET HIS INJECTIONS. PATIENT STATES HE CAN BRING CD TO OFFICE IF KAI NEEDS TO SEE IMAGING. PLEASE CALL PATIENT TO ADVISE.    THANK YOU

## 2024-04-02 ENCOUNTER — ANCILLARY ORDERS (OUTPATIENT)
Dept: OTHER | Facility: HOSPITAL | Age: 69
End: 2024-04-02
Payer: MEDICARE

## 2024-04-03 ENCOUNTER — TELEPHONE (OUTPATIENT)
Dept: NEUROSURGERY | Facility: CLINIC | Age: 69
End: 2024-04-03
Payer: MEDICARE

## 2024-04-03 DIAGNOSIS — S22.080A COMPRESSION FRACTURE OF T12 VERTEBRA, INITIAL ENCOUNTER: ICD-10-CM

## 2024-04-03 DIAGNOSIS — S32.020D COMPRESSION FRACTURE OF L2 VERTEBRA WITH ROUTINE HEALING, SUBSEQUENT ENCOUNTER: Primary | ICD-10-CM

## 2024-04-03 NOTE — TELEPHONE ENCOUNTER
Outside images were loaded to our system: 4/2/2024  Date of images:3/25/2024  Images loaded: Lumbar CT [facility]  Placed at : 4/3/2024    Patient would like disc mailed to him. (Spoke to him in a different conversation yesterday.)